# Patient Record
Sex: FEMALE | Race: WHITE | Employment: UNEMPLOYED | URBAN - METROPOLITAN AREA
[De-identification: names, ages, dates, MRNs, and addresses within clinical notes are randomized per-mention and may not be internally consistent; named-entity substitution may affect disease eponyms.]

---

## 2017-03-13 ENCOUNTER — OFFICE VISIT (OUTPATIENT)
Dept: INTERNAL MEDICINE CLINIC | Facility: CLINIC | Age: 57
End: 2017-03-13

## 2017-03-13 DIAGNOSIS — Z12.31 VISIT FOR SCREENING MAMMOGRAM: Primary | ICD-10-CM

## 2017-03-13 PROBLEM — M19.012 ARTHRITIS OF LEFT SHOULDER REGION: Status: ACTIVE | Noted: 2017-03-13

## 2017-03-13 PROBLEM — M75.40 IMPINGEMENT SYNDROME, SHOULDER: Status: ACTIVE | Noted: 2017-03-13

## 2017-03-13 PROBLEM — M25.512 ACUTE PAIN OF LEFT SHOULDER: Status: ACTIVE | Noted: 2017-03-13

## 2017-03-13 NOTE — PROGRESS NOTES
Patient here for annual appointment but 3 months early. Will order mamm and have her return in June due to insurance issues, no acute issues.

## 2017-03-18 ENCOUNTER — HOSPITAL ENCOUNTER (OUTPATIENT)
Dept: MAMMOGRAPHY | Age: 57
Discharge: HOME | End: 2017-03-18
Attending: INTERNAL MEDICINE
Payer: COMMERCIAL

## 2017-03-18 DIAGNOSIS — Z12.31 VISIT FOR SCREENING MAMMOGRAM: ICD-10-CM

## 2017-03-18 PROCEDURE — 77067 SCR MAMMO BI INCL CAD: CPT

## 2017-04-07 ENCOUNTER — TELEPHONE (OUTPATIENT)
Dept: INTERNAL MEDICINE CLINIC | Facility: CLINIC | Age: 57
End: 2017-04-07

## 2017-04-07 ENCOUNTER — APPOINTMENT (OUTPATIENT)
Dept: LAB | Facility: HOSPITAL | Age: 57
End: 2017-04-07
Attending: INTERNAL MEDICINE
Payer: COMMERCIAL

## 2017-04-07 DIAGNOSIS — R30.0 DYSURIA: Primary | ICD-10-CM

## 2017-04-07 PROCEDURE — 81003 URINALYSIS AUTO W/O SCOPE: CPT | Performed by: INTERNAL MEDICINE

## 2017-04-07 RX ORDER — SULFAMETHOXAZOLE AND TRIMETHOPRIM 800; 160 MG/1; MG/1
1 TABLET ORAL 2 TIMES DAILY
Qty: 14 TABLET | Refills: 0 | Status: SHIPPED | OUTPATIENT
Start: 2017-04-07 | End: 2017-04-14

## 2017-04-07 NOTE — TELEPHONE ENCOUNTER
Left message on machine new Rx sent to [address] [place] [address] If needs to be sent to another pharmacy please call office.

## 2017-04-07 NOTE — TELEPHONE ENCOUNTER
Called and spoke to patient in regards to her symptoms. Per patient she has burning sensation when she urinates, only at the end of her micturition.   Per patient, she has no reports of more urgency, frequency, patient does feel like she can completely emp

## 2017-04-17 PROBLEM — M75.42 IMPINGEMENT SYNDROME OF LEFT SHOULDER: Status: ACTIVE | Noted: 2017-04-17

## 2017-04-17 PROBLEM — M75.80 ROTATOR CUFF TENDONITIS: Status: ACTIVE | Noted: 2017-04-17

## 2017-04-17 PROBLEM — M75.20 BICEPS TENDONITIS: Status: ACTIVE | Noted: 2017-04-17

## 2017-04-17 PROBLEM — M19.012 ARTHRITIS OF SHOULDER REGION, LEFT: Status: ACTIVE | Noted: 2017-04-17

## 2017-07-15 ENCOUNTER — HOSPITAL ENCOUNTER (OUTPATIENT)
Age: 57
Discharge: HOME | End: 2017-07-15
Attending: FAMILY MEDICINE
Payer: COMMERCIAL

## 2017-07-15 VITALS
RESPIRATION RATE: 16 BRPM | HEART RATE: 70 BPM | OXYGEN SATURATION: 97 % | TEMPERATURE: 99 F | HEIGHT: 69 IN | SYSTOLIC BLOOD PRESSURE: 123 MMHG | WEIGHT: 200 LBS | BODY MASS INDEX: 29.62 KG/M2 | DIASTOLIC BLOOD PRESSURE: 60 MMHG

## 2017-07-15 DIAGNOSIS — H66.92 LEFT OTITIS MEDIA, UNSPECIFIED CHRONICITY, UNSPECIFIED OTITIS MEDIA TYPE: Primary | ICD-10-CM

## 2017-07-15 DIAGNOSIS — J30.9 ACUTE ALLERGIC RHINITIS, UNSPECIFIED SEASONALITY, UNSPECIFIED TRIGGER: ICD-10-CM

## 2017-07-15 PROCEDURE — 99214 OFFICE O/P EST MOD 30 MIN: CPT

## 2017-07-15 PROCEDURE — 99213 OFFICE O/P EST LOW 20 MIN: CPT

## 2017-07-15 RX ORDER — AZITHROMYCIN 250 MG/1
TABLET, FILM COATED ORAL
Qty: 1 PACKAGE | Refills: 0 | Status: SHIPPED | OUTPATIENT
Start: 2017-07-15 | End: 2017-07-20

## 2017-07-15 NOTE — ED PROVIDER NOTES
Patient Seen in: [address]    History   Patient presents with:  Ear Problem Pain (neurosensory)    Stated Complaint: clogged left ear    HPI    Patient here today with  Family with c/o URI symptoms for a couple of days, 90.7 kg   SpO2 97%   BMI 29.53 kg/m²       GENERAL: NAD  EARS: left tm injected, dull, no perforation,  NOSE: nasal turbinates boggy  THROAT:nl   LUNGS:clear no resp distress, increased upper airway sounds, clear at the bases  SKIN: good skin turgor, no ob

## 2017-07-15 NOTE — ED INITIAL ASSESSMENT (HPI)
Clogged left ear for 2 weeks; had URI about 2 weeks ago that resolved but congestion has remained in left ear; has tried nasal spray and sudafed

## 2017-08-26 ENCOUNTER — HOSPITAL ENCOUNTER (OUTPATIENT)
Age: 57
Discharge: HOME | End: 2017-08-26
Attending: FAMILY MEDICINE
Payer: COMMERCIAL

## 2017-08-26 VITALS
DIASTOLIC BLOOD PRESSURE: 63 MMHG | TEMPERATURE: 98 F | WEIGHT: 210 LBS | OXYGEN SATURATION: 99 % | SYSTOLIC BLOOD PRESSURE: 121 MMHG | HEART RATE: 76 BPM | HEIGHT: 69 IN | RESPIRATION RATE: 18 BRPM | BODY MASS INDEX: 31.1 KG/M2

## 2017-08-26 DIAGNOSIS — H65.05 RECURRENT ACUTE SEROUS OTITIS MEDIA OF LEFT EAR: Primary | ICD-10-CM

## 2017-08-26 PROCEDURE — 99214 OFFICE O/P EST MOD 30 MIN: CPT

## 2017-08-26 PROCEDURE — 99213 OFFICE O/P EST LOW 20 MIN: CPT

## 2017-08-26 RX ORDER — CEFDINIR 300 MG/1
300 CAPSULE ORAL 2 TIMES DAILY
Qty: 20 CAPSULE | Refills: 0 | Status: SHIPPED | OUTPATIENT
Start: 2017-08-26 | End: 2017-09-05

## 2017-08-26 RX ORDER — METHYLPREDNISOLONE 4 MG/1
TABLET ORAL
Qty: 21 TABLET | Refills: 0 | Status: SHIPPED | OUTPATIENT
Start: 2017-08-26 | End: 2017-09-01 | Stop reason: ALTCHOICE

## 2017-08-26 NOTE — ED INITIAL ASSESSMENT (HPI)
Left ear pain     Seen 1 month ago for the same complaint. .Doctor dx her with seasonal allergies and gave her Claritin and Flonase. Pt. Has been off of it for 3 days and her ear pain has come back.

## 2017-08-26 NOTE — ED PROVIDER NOTES
Patient presents with:  Ear Problem Pain (neurosensory)    HPI:     Stephania Jane is a 56year old female who presents with chief complaint of left ear pain. Onset of symptoms was abrupt starting 1 month ago.     Recent URI: no   Fever: no   Sore throat canal is within normal limits. No debris, inflammation noted. No tragal or pinna tenderness noted.   Nose: mild congestion  Throat: lips, mucosa, and tongue normal; teeth and gums normal  Neck: no adenopathy and supple, symmetrical, trachea midline  Lungs

## 2017-08-29 PROBLEM — M75.82 ROTATOR CUFF TENDONITIS, LEFT: Status: ACTIVE | Noted: 2017-04-17

## 2017-08-29 PROBLEM — M75.22 BICEPS TENDONITIS, LEFT: Status: ACTIVE | Noted: 2017-04-17

## 2017-09-01 ENCOUNTER — OFFICE VISIT (OUTPATIENT)
Dept: OTOLARYNGOLOGY | Facility: CLINIC | Age: 57
End: 2017-09-01

## 2017-09-01 VITALS
WEIGHT: 210 LBS | RESPIRATION RATE: 16 BRPM | BODY MASS INDEX: 31.83 KG/M2 | SYSTOLIC BLOOD PRESSURE: 112 MMHG | HEIGHT: 68 IN | DIASTOLIC BLOOD PRESSURE: 64 MMHG | TEMPERATURE: 99 F

## 2017-09-01 DIAGNOSIS — H65.32 CHRONIC MUCOID OTITIS MEDIA OF LEFT EAR: Primary | ICD-10-CM

## 2017-09-01 PROCEDURE — 99212 OFFICE O/P EST SF 10 MIN: CPT | Performed by: OTOLARYNGOLOGY

## 2017-09-01 PROCEDURE — 69420 INCISION OF EARDRUM: CPT | Performed by: OTOLARYNGOLOGY

## 2017-09-01 PROCEDURE — 99243 OFF/OP CNSLTJ NEW/EST LOW 30: CPT | Performed by: OTOLARYNGOLOGY

## 2017-09-01 NOTE — PROGRESS NOTES
Nir Salgado is a 56year old female. Patient presents with:  Ear Problem: Patient present for blocked left ear.  Patient c/o left ear pain 2/10 that started this morning         HISTORY OF PRESENT ILLNESS  9/1/2017   Here for evaluation of persistent plu intolerance. Neuro Negative Tremors. Psych Negative Behavioral issues   Integumentary Negative Frequent skin infections, pigment change and rash. Hema/Lymph Negative Easy bleeding and easy bruising.      PHYSICAL EXAM    /64 (BP Location: Left a 1 tablet (0.25 mg total) by mouth 2 (two) times daily as needed. , Disp: 30 tablet, Rfl: 1    ASSESSMENT AND PLAN  1.  Chronic mucoid otitis media of left ear  Myringotomy was done today and I recommend she keep water out of the ear follow-up if she still ha

## 2017-09-08 ENCOUNTER — TELEPHONE (OUTPATIENT)
Dept: OTOLARYNGOLOGY | Facility: CLINIC | Age: 57
End: 2017-09-08

## 2017-09-08 NOTE — TELEPHONE ENCOUNTER
, pt of  seen on 09/01 chronic mucoid otitis media of left ear and myringotomy performed.  Per pt Dr. Fer Buenrostro informed for the first 3 days she should try popping ear by holding breath, pt states she did that for three days as instructed an

## 2017-09-11 ENCOUNTER — OFFICE VISIT (OUTPATIENT)
Dept: OTOLARYNGOLOGY | Facility: CLINIC | Age: 57
End: 2017-09-11

## 2017-09-11 VITALS
SYSTOLIC BLOOD PRESSURE: 111 MMHG | BODY MASS INDEX: 30.06 KG/M2 | WEIGHT: 210 LBS | HEIGHT: 70 IN | DIASTOLIC BLOOD PRESSURE: 71 MMHG | HEART RATE: 74 BPM

## 2017-09-11 DIAGNOSIS — H65.32 CHRONIC MUCOID OTITIS MEDIA OF LEFT EAR: Primary | ICD-10-CM

## 2017-09-11 PROCEDURE — 99212 OFFICE O/P EST SF 10 MIN: CPT | Performed by: OTOLARYNGOLOGY

## 2017-09-11 PROCEDURE — 99024 POSTOP FOLLOW-UP VISIT: CPT | Performed by: OTOLARYNGOLOGY

## 2017-09-11 RX ORDER — OFLOXACIN 3 MG/ML
3 SOLUTION AURICULAR (OTIC) 3 TIMES DAILY
Qty: 1 BOTTLE | Refills: 0 | Status: SHIPPED | OUTPATIENT
Start: 2017-09-11 | End: 2017-09-16

## 2017-09-11 NOTE — PROGRESS NOTES
Alfredo Pisano is a 56year old female. Patient presents with: Follow - Up: Pt feels her left ear is still filled with fluid. Took Sudafed ad Afrin Nasal Spray which helped some but she can still feel \"sloshing\" around.         HISTORY OF PRESENT ILLNESS Negative Abdominal pain and diarrhea. Endocrine Negative Cold intolerance and heat intolerance. Neuro Negative Tremors. Psych Negative Behavioral issues   Integumentary Negative Frequent skin infections, pigment change and rash.    Hema/Lymph Negative

## 2017-12-08 ENCOUNTER — OFFICE VISIT (OUTPATIENT)
Dept: INTERNAL MEDICINE CLINIC | Facility: CLINIC | Age: 57
End: 2017-12-08

## 2017-12-08 VITALS
SYSTOLIC BLOOD PRESSURE: 100 MMHG | TEMPERATURE: 98 F | BODY MASS INDEX: 30.64 KG/M2 | DIASTOLIC BLOOD PRESSURE: 65 MMHG | WEIGHT: 214 LBS | HEIGHT: 70 IN | HEART RATE: 65 BPM

## 2017-12-08 DIAGNOSIS — Z12.31 SCREENING MAMMOGRAM, ENCOUNTER FOR: ICD-10-CM

## 2017-12-08 DIAGNOSIS — Z00.00 ROUTINE GENERAL MEDICAL EXAMINATION AT A HEALTH CARE FACILITY: Primary | ICD-10-CM

## 2017-12-08 PROBLEM — M25.512 ACUTE PAIN OF LEFT SHOULDER: Status: RESOLVED | Noted: 2017-03-13 | Resolved: 2017-12-08

## 2017-12-08 PROCEDURE — 36415 COLL VENOUS BLD VENIPUNCTURE: CPT | Performed by: INTERNAL MEDICINE

## 2017-12-08 PROCEDURE — 99396 PREV VISIT EST AGE 40-64: CPT | Performed by: INTERNAL MEDICINE

## 2017-12-08 RX ORDER — ALPRAZOLAM 0.25 MG/1
0.25 TABLET ORAL 2 TIMES DAILY PRN
Qty: 30 TABLET | Refills: 1 | Status: SHIPPED | OUTPATIENT
Start: 2017-12-08 | End: 2020-07-13

## 2017-12-08 NOTE — PROGRESS NOTES
Riki Chanel is a 56year old female who presents for a complete physical exam.   HPI:   Pt complains of needs PE    Had 'frozen shoulder' saw Dr Gallardo He, required PT which seemed to have helped     Wt Readings from Last 3 Encounters:  12/08/17 : 214 lb Negative Anxiety, depression and insomnia but mild anxiety with flying, uses xanax prn   Integumentary Negative Rash, changing skin lesions   MS Negative Back pain and shoulder pain - post PT, plantar fasciitis and achilles tendon   Hema/Lymph Negative Eas agrees to the plan. The patient is asked to return for annual physical in 1 year.

## 2017-12-08 NOTE — PATIENT INSTRUCTIONS
ASSESSMENT AND PLAN:   Tiago Zavala is a 56year old female who presents for a complete physical exam.   Pt's weight is Body mass index is 30.71 kg/m². , recommended low fat diet and aerobic exercise 30 minutes three times weekly.    Health maintenance: pa

## 2018-03-21 ENCOUNTER — HOSPITAL ENCOUNTER (OUTPATIENT)
Age: 58
Discharge: HOME | End: 2018-03-21
Attending: FAMILY MEDICINE
Payer: COMMERCIAL

## 2018-03-21 ENCOUNTER — APPOINTMENT (OUTPATIENT)
Dept: GENERAL RADIOLOGY | Age: 58
End: 2018-03-21
Attending: FAMILY MEDICINE
Payer: COMMERCIAL

## 2018-03-21 VITALS
RESPIRATION RATE: 17 BRPM | DIASTOLIC BLOOD PRESSURE: 53 MMHG | HEIGHT: 69 IN | SYSTOLIC BLOOD PRESSURE: 96 MMHG | OXYGEN SATURATION: 100 % | BODY MASS INDEX: 30.36 KG/M2 | TEMPERATURE: 98 F | HEART RATE: 70 BPM | WEIGHT: 205 LBS

## 2018-03-21 DIAGNOSIS — M17.10 ARTHRITIS OF KNEE: Primary | ICD-10-CM

## 2018-03-21 PROCEDURE — 99214 OFFICE O/P EST MOD 30 MIN: CPT

## 2018-03-21 PROCEDURE — 99213 OFFICE O/P EST LOW 20 MIN: CPT

## 2018-03-21 RX ORDER — METHYLPREDNISOLONE 4 MG/1
TABLET ORAL
Qty: 1 PACKAGE | Refills: 0 | Status: SHIPPED | OUTPATIENT
Start: 2018-03-21 | End: 2018-03-26

## 2018-03-21 RX ORDER — HYDROCODONE BITARTRATE AND ACETAMINOPHEN 5; 325 MG/1; MG/1
1-2 TABLET ORAL EVERY 4 HOURS PRN
Qty: 20 TABLET | Refills: 0 | Status: SHIPPED | OUTPATIENT
Start: 2018-03-21 | End: 2018-03-26

## 2018-03-21 RX ORDER — MELOXICAM 7.5 MG/1
7.5 TABLET ORAL DAILY
COMMUNITY
End: 2019-04-17 | Stop reason: ALTCHOICE

## 2018-03-21 NOTE — ED PROVIDER NOTES
Patient Seen in: [address]    History   CC:  Patient presents with:  Lower Extremity Injury (musculoskeletal)    Stated Complaint: lt knee pain    ------------------------------  Per Rn: (paraphrase)     pt w/ left knee EOM's intact   Ears:    Normal TM's and external ear canals, both ears   Nose:   Nares normal, septum midline, mucosa normal, min drainage, clear, no sinus tenderness   Throat:   mild hyperemia, no exudate;    Neck:   Supple, symmetrical, trachea midline,

## 2018-03-21 NOTE — ED INITIAL ASSESSMENT (HPI)
Verified pt's name and birthday at start of triage with pt and ID band applied. Pt presents to the IC with c/o left knee pain s/p kneeling on it while working out on Sunday. Pt notes swelling since yesterday. Denies numbness/tingling. Arrives with bandage.

## 2018-08-10 ENCOUNTER — HOSPITAL ENCOUNTER (OUTPATIENT)
Dept: MAMMOGRAPHY | Age: 58
Discharge: HOME | End: 2018-08-10
Attending: INTERNAL MEDICINE
Payer: COMMERCIAL

## 2018-08-10 DIAGNOSIS — Z12.31 SCREENING MAMMOGRAM, ENCOUNTER FOR: ICD-10-CM

## 2018-08-10 PROCEDURE — 77067 SCR MAMMO BI INCL CAD: CPT | Performed by: INTERNAL MEDICINE

## 2018-08-10 PROCEDURE — 77063 BREAST TOMOSYNTHESIS BI: CPT | Performed by: INTERNAL MEDICINE

## 2018-08-21 ENCOUNTER — HOSPITAL ENCOUNTER (OUTPATIENT)
Dept: MAMMOGRAPHY | Facility: HOSPITAL | Age: 58
Discharge: HOME | End: 2018-08-21
Attending: INTERNAL MEDICINE
Payer: COMMERCIAL

## 2018-08-21 ENCOUNTER — HOSPITAL ENCOUNTER (OUTPATIENT)
Dept: ULTRASOUND IMAGING | Facility: HOSPITAL | Age: 58
Discharge: HOME | End: 2018-08-21
Attending: INTERNAL MEDICINE
Payer: COMMERCIAL

## 2018-08-21 DIAGNOSIS — R92.8 ABNORMAL MAMMOGRAM: ICD-10-CM

## 2018-08-21 PROCEDURE — 77065 DX MAMMO INCL CAD UNI: CPT | Performed by: INTERNAL MEDICINE

## 2018-08-21 PROCEDURE — 76642 ULTRASOUND BREAST LIMITED: CPT | Performed by: INTERNAL MEDICINE

## 2018-08-21 PROCEDURE — 77061 BREAST TOMOSYNTHESIS UNI: CPT | Performed by: INTERNAL MEDICINE

## 2019-04-17 ENCOUNTER — OFFICE VISIT (OUTPATIENT)
Dept: INTERNAL MEDICINE CLINIC | Facility: CLINIC | Age: 59
End: 2019-04-17
Payer: COMMERCIAL

## 2019-04-17 VITALS
HEIGHT: 70 IN | DIASTOLIC BLOOD PRESSURE: 70 MMHG | HEART RATE: 74 BPM | OXYGEN SATURATION: 99 % | SYSTOLIC BLOOD PRESSURE: 118 MMHG | WEIGHT: 221.19 LBS | BODY MASS INDEX: 31.67 KG/M2

## 2019-04-17 DIAGNOSIS — Z00.00 PHYSICAL EXAM: Primary | ICD-10-CM

## 2019-04-17 PROCEDURE — 99386 PREV VISIT NEW AGE 40-64: CPT | Performed by: FAMILY MEDICINE

## 2019-04-17 NOTE — PROGRESS NOTES
HPI:   Lata Palumbo is a 66year old female who presents for a complete physical exam.  New pt  Past pcp Dr Daisha Wiley     Last mammo:  needs  Last pap:  UTD  Menses:  no  Previous colonoscopy:  UTD  Exercise:  Stopped   Tries to eat clean- no dairy/ grain Alcohol use:  Yes      Alcohol/week: 0.0 oz      Comment: Socially     Drug use: No         REVIEW OF SYSTEMS:   GENERAL: feels well otherwise  SKIN: denies any unusual skin lesions  EYES:no vision problems  LUNGS: denies shortness of breath  CARDIOVASCULAR progesterone, however, if has any more vaginal bleeding, pt to let me know and also to see gynecology. Also need her recent pap records  -work on increase exercise and healthy diet  - [facility] DIAGNOSTIC RIGHT (CPT=77065);  Future  - COMP METABOLIC PANEL (14);

## 2019-05-02 ENCOUNTER — HOSPITAL ENCOUNTER (OUTPATIENT)
Dept: MAMMOGRAPHY | Facility: HOSPITAL | Age: 59
Discharge: HOME | End: 2019-05-02
Attending: FAMILY MEDICINE
Payer: COMMERCIAL

## 2019-05-02 ENCOUNTER — LAB ENCOUNTER (OUTPATIENT)
Dept: LAB | Facility: HOSPITAL | Age: 59
End: 2019-05-02
Attending: FAMILY MEDICINE
Payer: COMMERCIAL

## 2019-05-02 DIAGNOSIS — Z00.00 PHYSICAL EXAM: ICD-10-CM

## 2019-05-02 DIAGNOSIS — R92.8 ABNORMAL MAMMOGRAM: Primary | ICD-10-CM

## 2019-05-02 PROCEDURE — 85025 COMPLETE CBC W/AUTO DIFF WBC: CPT

## 2019-05-02 PROCEDURE — 80061 LIPID PANEL: CPT

## 2019-05-02 PROCEDURE — 77065 DX MAMMO INCL CAD UNI: CPT | Performed by: FAMILY MEDICINE

## 2019-05-02 PROCEDURE — 84443 ASSAY THYROID STIM HORMONE: CPT

## 2019-05-02 PROCEDURE — 77061 BREAST TOMOSYNTHESIS UNI: CPT | Performed by: FAMILY MEDICINE

## 2019-05-02 PROCEDURE — 82306 VITAMIN D 25 HYDROXY: CPT

## 2019-05-02 PROCEDURE — 36415 COLL VENOUS BLD VENIPUNCTURE: CPT

## 2019-05-02 PROCEDURE — 80053 COMPREHEN METABOLIC PANEL: CPT

## 2019-05-06 ENCOUNTER — TELEPHONE (OUTPATIENT)
Dept: FAMILY MEDICINE CLINIC | Facility: CLINIC | Age: 59
End: 2019-05-06

## 2019-10-22 ENCOUNTER — TELEPHONE (OUTPATIENT)
Dept: INTERNAL MEDICINE CLINIC | Facility: CLINIC | Age: 59
End: 2019-10-22

## 2019-10-22 DIAGNOSIS — R92.8 ABNORMAL MAMMOGRAM: Primary | ICD-10-CM

## 2019-10-22 NOTE — TELEPHONE ENCOUNTER
LVM for pt that I will forward msg to Dr. Elisabeth Barrow to add new orders & c/b when placed.     CV

## 2019-10-29 ENCOUNTER — TELEPHONE (OUTPATIENT)
Dept: INTERNAL MEDICINE CLINIC | Facility: CLINIC | Age: 59
End: 2019-10-29

## 2019-11-01 ENCOUNTER — TELEPHONE (OUTPATIENT)
Dept: INTERNAL MEDICINE CLINIC | Facility: CLINIC | Age: 59
End: 2019-11-01

## 2019-11-01 NOTE — TELEPHONE ENCOUNTER
Patient called and said she still doesn't see the mammogram referral in her Mychart to call to get her mammogram scheduled. Asked if Suad White the nurse could please help in getting the referral she needs. Order was placed by Dr. Marie Zeng on 10/22.

## 2019-11-08 ENCOUNTER — HOSPITAL ENCOUNTER (OUTPATIENT)
Dept: MAMMOGRAPHY | Facility: HOSPITAL | Age: 59
Discharge: HOME | End: 2019-11-08
Attending: FAMILY MEDICINE
Payer: COMMERCIAL

## 2019-11-08 DIAGNOSIS — R92.8 ABNORMAL MAMMOGRAM: ICD-10-CM

## 2019-11-08 PROCEDURE — 77062 BREAST TOMOSYNTHESIS BI: CPT | Performed by: FAMILY MEDICINE

## 2019-11-08 PROCEDURE — 77066 DX MAMMO INCL CAD BI: CPT | Performed by: FAMILY MEDICINE

## 2020-01-12 ENCOUNTER — APPOINTMENT (OUTPATIENT)
Dept: GENERAL RADIOLOGY | Age: 60
End: 2020-01-12
Attending: EMERGENCY MEDICINE
Payer: COMMERCIAL

## 2020-01-12 ENCOUNTER — HOSPITAL ENCOUNTER (OUTPATIENT)
Age: 60
Discharge: HOME | End: 2020-01-12
Attending: EMERGENCY MEDICINE
Payer: COMMERCIAL

## 2020-01-12 VITALS
HEART RATE: 58 BPM | DIASTOLIC BLOOD PRESSURE: 69 MMHG | WEIGHT: 230 LBS | SYSTOLIC BLOOD PRESSURE: 127 MMHG | OXYGEN SATURATION: 100 % | RESPIRATION RATE: 20 BRPM | BODY MASS INDEX: 34.07 KG/M2 | HEIGHT: 69 IN | TEMPERATURE: 98 F

## 2020-01-12 DIAGNOSIS — M62.830 SPASM OF THORACIC BACK MUSCLE: Primary | ICD-10-CM

## 2020-01-12 PROCEDURE — 99214 OFFICE O/P EST MOD 30 MIN: CPT

## 2020-01-12 PROCEDURE — 72072 X-RAY EXAM THORAC SPINE 3VWS: CPT | Performed by: EMERGENCY MEDICINE

## 2020-01-12 PROCEDURE — 99213 OFFICE O/P EST LOW 20 MIN: CPT

## 2020-01-12 RX ORDER — IBUPROFEN 800 MG/1
800 TABLET ORAL EVERY 6 HOURS PRN
COMMUNITY
End: 2021-10-25 | Stop reason: ALTCHOICE

## 2020-01-12 RX ORDER — DIAZEPAM 5 MG/1
5 TABLET ORAL NIGHTLY PRN
Qty: 7 TABLET | Refills: 0 | Status: SHIPPED | OUTPATIENT
Start: 2020-01-12 | End: 2020-01-19

## 2020-01-12 NOTE — ED PROVIDER NOTES
Patient Seen in: [address]    History   Patient presents with:  Back Pain    Stated Complaint: back pain    HPI    Chief complaint: Back pain    History of present illness:   The patient complains of back pain, that began Pulse 58   Resp 20   Temp 97.5 °F (36.4 °C)   Temp src Oral   SpO2 100 %   O2 Device None (Room air)       Current:/69   Pulse 58   Temp 97.5 °F (36.4 °C) (Oral)   Resp 20   Ht 175.3 cm (5' 9\")   Wt 104.3 kg   SpO2 100%   BMI 33.97 kg/m²     PULSE

## 2020-01-12 NOTE — ED INITIAL ASSESSMENT (HPI)
Pt to IC with thoracic back pain starting yesterday upon getting out of bed. States getting progressively worse. Took Ibuprofen 800mg at 0600 with moderate relief reported. Denies previous injury. Denies incident of injury.  Denies changes in bowel/bladder

## 2020-07-13 ENCOUNTER — OFFICE VISIT (OUTPATIENT)
Dept: INTERNAL MEDICINE CLINIC | Facility: CLINIC | Age: 60
End: 2020-07-13
Payer: COMMERCIAL

## 2020-07-13 VITALS
DIASTOLIC BLOOD PRESSURE: 74 MMHG | HEART RATE: 70 BPM | TEMPERATURE: 98 F | HEIGHT: 69 IN | SYSTOLIC BLOOD PRESSURE: 102 MMHG | OXYGEN SATURATION: 98 % | WEIGHT: 231.63 LBS | BODY MASS INDEX: 34.31 KG/M2

## 2020-07-13 DIAGNOSIS — Z00.00 PHYSICAL EXAM: Primary | ICD-10-CM

## 2020-07-13 PROCEDURE — 87624 HPV HI-RISK TYP POOLED RSLT: CPT | Performed by: FAMILY MEDICINE

## 2020-07-13 PROCEDURE — 99396 PREV VISIT EST AGE 40-64: CPT | Performed by: FAMILY MEDICINE

## 2020-07-13 RX ORDER — MELOXICAM 7.5 MG/1
TABLET ORAL
COMMUNITY
Start: 2020-06-27 | End: 2021-12-01

## 2020-07-13 NOTE — PROGRESS NOTES
HPI:   Alice Watts is a 64year old female who presents for a complete physical exam.  New pt      Last mammo:  UTD  Last pap:  needs  Menses:  no  Previous colonoscopy:  UTD  Exercise:  3 x a week      Has gained a lot of weight ( knows how)  Trying problems  LUNGS: denies shortness of breath  CARDIOVASCULAR: denies chest pain  GI: denies abdominal pain,  No constipation or diarrhea  : denies dysuria, vaginal discharge or itching  NEURO: denies headaches  PSYCHE: denies depression or anxiety    EXAM liquid calories. Pt given printed info on weight loss recommendations. - CBC WITH DIFFERENTIAL WITH PLATELET; Future  - COMP METABOLIC PANEL (14); Future  - TSH W REFLEX TO FREE T4; Future  - VITAMIN D, 25-HYDROXY; Future  - LIPID PANEL;  Future  - THIN

## 2020-07-15 LAB — HPV I/H RISK 1 DNA SPEC QL NAA+PROBE: NEGATIVE

## 2020-07-21 ENCOUNTER — NURSE ONLY (OUTPATIENT)
Dept: INTERNAL MEDICINE CLINIC | Facility: CLINIC | Age: 60
End: 2020-07-21
Payer: COMMERCIAL

## 2020-07-21 DIAGNOSIS — Z00.00 PHYSICAL EXAM: ICD-10-CM

## 2020-07-21 LAB
ALBUMIN SERPL-MCNC: 3.5 G/DL (ref 3.4–5)
ALBUMIN/GLOB SERPL: 0.9 {RATIO} (ref 1–2)
ALP LIVER SERPL-CCNC: 82 U/L (ref 46–118)
ALT SERPL-CCNC: 31 U/L (ref 13–56)
ANION GAP SERPL CALC-SCNC: 6 MMOL/L (ref 0–18)
AST SERPL-CCNC: 28 U/L (ref 15–37)
BASOPHILS # BLD AUTO: 0.05 X10(3) UL (ref 0–0.2)
BASOPHILS NFR BLD AUTO: 0.7 %
BILIRUB SERPL-MCNC: 0.6 MG/DL (ref 0.1–2)
BUN BLD-MCNC: 14 MG/DL (ref 7–18)
BUN/CREAT SERPL: 15.4 (ref 10–20)
CALCIUM BLD-MCNC: 9 MG/DL (ref 8.5–10.1)
CHLORIDE SERPL-SCNC: 109 MMOL/L (ref 98–112)
CHOLEST SMN-MCNC: 212 MG/DL (ref ?–200)
CO2 SERPL-SCNC: 26 MMOL/L (ref 21–32)
CREAT BLD-MCNC: 0.91 MG/DL (ref 0.55–1.02)
DEPRECATED RDW RBC AUTO: 48.1 FL (ref 35.1–46.3)
EOSINOPHIL # BLD AUTO: 0.12 X10(3) UL (ref 0–0.7)
EOSINOPHIL NFR BLD AUTO: 1.7 %
ERYTHROCYTE [DISTWIDTH] IN BLOOD BY AUTOMATED COUNT: 13.7 % (ref 11–15)
GLOBULIN PLAS-MCNC: 4 G/DL (ref 2.8–4.4)
GLUCOSE BLD-MCNC: 87 MG/DL (ref 70–99)
HCT VFR BLD AUTO: 43.4 % (ref 35–48)
HDLC SERPL-MCNC: 78 MG/DL (ref 40–59)
HGB BLD-MCNC: 14.1 G/DL (ref 12–16)
IMM GRANULOCYTES # BLD AUTO: 0.03 X10(3) UL (ref 0–1)
IMM GRANULOCYTES NFR BLD: 0.4 %
LDLC SERPL CALC-MCNC: 120 MG/DL (ref ?–100)
LYMPHOCYTES # BLD AUTO: 2.3 X10(3) UL (ref 1–4)
LYMPHOCYTES NFR BLD AUTO: 31.8 %
M PROTEIN MFR SERPL ELPH: 7.5 G/DL (ref 6.4–8.2)
MCH RBC QN AUTO: 31.1 PG (ref 26–34)
MCHC RBC AUTO-ENTMCNC: 32.5 G/DL (ref 31–37)
MCV RBC AUTO: 95.6 FL (ref 80–100)
MONOCYTES # BLD AUTO: 0.58 X10(3) UL (ref 0.1–1)
MONOCYTES NFR BLD AUTO: 8 %
NEUTROPHILS # BLD AUTO: 4.16 X10 (3) UL (ref 1.5–7.7)
NEUTROPHILS # BLD AUTO: 4.16 X10(3) UL (ref 1.5–7.7)
NEUTROPHILS NFR BLD AUTO: 57.4 %
NONHDLC SERPL-MCNC: 134 MG/DL (ref ?–130)
OSMOLALITY SERPL CALC.SUM OF ELEC: 292 MOSM/KG (ref 275–295)
PATIENT FASTING Y/N/NP: YES
PATIENT FASTING Y/N/NP: YES
PLATELET # BLD AUTO: 261 10(3)UL (ref 150–450)
POTASSIUM SERPL-SCNC: 4.3 MMOL/L (ref 3.5–5.1)
RBC # BLD AUTO: 4.54 X10(6)UL (ref 3.8–5.3)
SODIUM SERPL-SCNC: 141 MMOL/L (ref 136–145)
T4 FREE SERPL-MCNC: 0.9 NG/DL (ref 0.8–1.7)
TRIGL SERPL-MCNC: 68 MG/DL (ref 30–149)
TSI SER-ACNC: 4.88 MIU/ML (ref 0.36–3.74)
VLDLC SERPL CALC-MCNC: 14 MG/DL (ref 0–30)
WBC # BLD AUTO: 7.2 X10(3) UL (ref 4–11)

## 2020-07-21 PROCEDURE — 80050 GENERAL HEALTH PANEL: CPT | Performed by: FAMILY MEDICINE

## 2020-07-21 PROCEDURE — 82306 VITAMIN D 25 HYDROXY: CPT | Performed by: FAMILY MEDICINE

## 2020-07-21 PROCEDURE — 84439 ASSAY OF FREE THYROXINE: CPT | Performed by: FAMILY MEDICINE

## 2020-07-21 PROCEDURE — 36415 COLL VENOUS BLD VENIPUNCTURE: CPT | Performed by: FAMILY MEDICINE

## 2020-07-21 PROCEDURE — 80061 LIPID PANEL: CPT | Performed by: FAMILY MEDICINE

## 2020-07-21 NOTE — PROGRESS NOTES
Pt presented to clinic today for blood draw. Per physician able to draw orders. Orders  documented within chart. Pt tolerated lab draw well.  verified.   Orders drawn include: cbc, cmp, tsh, vit d, lipid  Site of draw: rt rita Polanco, [place]

## 2020-07-22 LAB — 25(OH)D3 SERPL-MCNC: 37.3 NG/ML (ref 30–100)

## 2020-10-13 ENCOUNTER — IMMUNIZATION (OUTPATIENT)
Dept: INTERNAL MEDICINE CLINIC | Facility: CLINIC | Age: 60
End: 2020-10-13
Payer: COMMERCIAL

## 2020-10-13 DIAGNOSIS — Z23 NEED FOR VACCINATION: ICD-10-CM

## 2020-10-26 PROCEDURE — 90686 IIV4 VACC NO PRSV 0.5 ML IM: CPT | Performed by: FAMILY MEDICINE

## 2020-10-26 PROCEDURE — 90471 IMMUNIZATION ADMIN: CPT | Performed by: FAMILY MEDICINE

## 2020-11-19 ENCOUNTER — HOSPITAL ENCOUNTER (OUTPATIENT)
Dept: MAMMOGRAPHY | Facility: HOSPITAL | Age: 60
Discharge: HOME | End: 2020-11-19
Attending: FAMILY MEDICINE
Payer: COMMERCIAL

## 2020-11-19 DIAGNOSIS — Z12.31 ENCOUNTER FOR SCREENING MAMMOGRAM FOR MALIGNANT NEOPLASM OF BREAST: ICD-10-CM

## 2020-11-19 PROCEDURE — 77063 BREAST TOMOSYNTHESIS BI: CPT | Performed by: FAMILY MEDICINE

## 2020-11-19 PROCEDURE — 77067 SCR MAMMO BI INCL CAD: CPT | Performed by: FAMILY MEDICINE

## 2021-10-25 ENCOUNTER — OFFICE VISIT (OUTPATIENT)
Dept: INTERNAL MEDICINE CLINIC | Facility: CLINIC | Age: 61
End: 2021-10-25
Payer: COMMERCIAL

## 2021-10-25 VITALS
RESPIRATION RATE: 16 BRPM | SYSTOLIC BLOOD PRESSURE: 110 MMHG | WEIGHT: 231 LBS | DIASTOLIC BLOOD PRESSURE: 70 MMHG | HEART RATE: 66 BPM | BODY MASS INDEX: 34.21 KG/M2 | HEIGHT: 69 IN | OXYGEN SATURATION: 98 %

## 2021-10-25 DIAGNOSIS — E66.9 OBESITY (BMI 30.0-34.9): ICD-10-CM

## 2021-10-25 DIAGNOSIS — Z12.31 ENCOUNTER FOR SCREENING MAMMOGRAM FOR MALIGNANT NEOPLASM OF BREAST: ICD-10-CM

## 2021-10-25 DIAGNOSIS — F41.9 ANXIETY: ICD-10-CM

## 2021-10-25 DIAGNOSIS — Z00.00 PHYSICAL EXAM: Primary | ICD-10-CM

## 2021-10-25 PROCEDURE — 3078F DIAST BP <80 MM HG: CPT | Performed by: FAMILY MEDICINE

## 2021-10-25 PROCEDURE — 90686 IIV4 VACC NO PRSV 0.5 ML IM: CPT | Performed by: FAMILY MEDICINE

## 2021-10-25 PROCEDURE — 99396 PREV VISIT EST AGE 40-64: CPT | Performed by: FAMILY MEDICINE

## 2021-10-25 PROCEDURE — 3074F SYST BP LT 130 MM HG: CPT | Performed by: FAMILY MEDICINE

## 2021-10-25 PROCEDURE — 3008F BODY MASS INDEX DOCD: CPT | Performed by: FAMILY MEDICINE

## 2021-10-25 PROCEDURE — 90471 IMMUNIZATION ADMIN: CPT | Performed by: FAMILY MEDICINE

## 2021-10-25 RX ORDER — ALPRAZOLAM 0.25 MG/1
0.25 TABLET ORAL 2 TIMES DAILY PRN
Qty: 20 TABLET | Refills: 0 | Status: SHIPPED | OUTPATIENT
Start: 2021-10-25

## 2021-10-25 NOTE — PROGRESS NOTES
HPI:   Neva Pyle is a 63year old female who presents for a complete physical exam.    Last mammo:  Due soon  Last pap:  UTD  Menses:  no  Previous colonoscopy:  Due next year   Exercise:  3 x a week    Just started weight watchers- has worked for h Used    Vaping Use      Vaping Use: Never used    Alcohol use:  Yes      Alcohol/week: 0.0 standard drinks      Comment: Socially     Drug use: No         REVIEW OF SYSTEMS:   GENERAL: feels well otherwise  SKIN: denies any unusual skin lesions  EYES:no vis DIFFERENTIAL WITH PLATELET; Future  - COMP METABOLIC PANEL (14); Future  - TSH W REFLEX TO FREE T4; Future  - LIPID PANEL; Future  - VITAMIN D; Future    2.  Encounter for screening mammogram for malignant neoplasm of breast    - [facility] XAVI 2D+3D SCREENING BI

## 2021-11-17 ENCOUNTER — LAB ENCOUNTER (OUTPATIENT)
Dept: LAB | Facility: HOSPITAL | Age: 61
End: 2021-11-17
Attending: FAMILY MEDICINE
Payer: COMMERCIAL

## 2021-11-17 DIAGNOSIS — Z00.00 PHYSICAL EXAM: ICD-10-CM

## 2021-11-17 PROCEDURE — 84443 ASSAY THYROID STIM HORMONE: CPT

## 2021-11-17 PROCEDURE — 85025 COMPLETE CBC W/AUTO DIFF WBC: CPT

## 2021-11-17 PROCEDURE — 80053 COMPREHEN METABOLIC PANEL: CPT

## 2021-11-17 PROCEDURE — 80061 LIPID PANEL: CPT

## 2021-11-17 PROCEDURE — 36415 COLL VENOUS BLD VENIPUNCTURE: CPT

## 2021-11-17 PROCEDURE — 84439 ASSAY OF FREE THYROXINE: CPT

## 2021-11-17 PROCEDURE — 82306 VITAMIN D 25 HYDROXY: CPT

## 2021-11-30 ENCOUNTER — HOSPITAL ENCOUNTER (OUTPATIENT)
Dept: MAMMOGRAPHY | Facility: HOSPITAL | Age: 61
Discharge: HOME | End: 2021-11-30
Attending: FAMILY MEDICINE
Payer: COMMERCIAL

## 2021-11-30 DIAGNOSIS — Z12.31 ENCOUNTER FOR SCREENING MAMMOGRAM FOR MALIGNANT NEOPLASM OF BREAST: ICD-10-CM

## 2021-11-30 PROCEDURE — 77067 SCR MAMMO BI INCL CAD: CPT | Performed by: FAMILY MEDICINE

## 2021-11-30 PROCEDURE — 77063 BREAST TOMOSYNTHESIS BI: CPT | Performed by: FAMILY MEDICINE

## 2021-12-01 ENCOUNTER — OFFICE VISIT (OUTPATIENT)
Dept: INTERNAL MEDICINE CLINIC | Facility: CLINIC | Age: 61
End: 2021-12-01
Payer: COMMERCIAL

## 2021-12-01 VITALS
OXYGEN SATURATION: 99 % | WEIGHT: 231 LBS | RESPIRATION RATE: 17 BRPM | SYSTOLIC BLOOD PRESSURE: 118 MMHG | DIASTOLIC BLOOD PRESSURE: 76 MMHG | HEART RATE: 69 BPM | HEIGHT: 69 IN | BODY MASS INDEX: 34.21 KG/M2

## 2021-12-01 DIAGNOSIS — F32.A DEPRESSION, UNSPECIFIED DEPRESSION TYPE: ICD-10-CM

## 2021-12-01 DIAGNOSIS — E03.8 SUBCLINICAL HYPOTHYROIDISM: Primary | ICD-10-CM

## 2021-12-01 PROCEDURE — 3008F BODY MASS INDEX DOCD: CPT | Performed by: FAMILY MEDICINE

## 2021-12-01 PROCEDURE — 3078F DIAST BP <80 MM HG: CPT | Performed by: FAMILY MEDICINE

## 2021-12-01 PROCEDURE — 99214 OFFICE O/P EST MOD 30 MIN: CPT | Performed by: FAMILY MEDICINE

## 2021-12-01 PROCEDURE — 3074F SYST BP LT 130 MM HG: CPT | Performed by: FAMILY MEDICINE

## 2021-12-01 RX ORDER — LEVOTHYROXINE SODIUM 0.03 MG/1
25 TABLET ORAL
Qty: 90 TABLET | Refills: 0 | Status: SHIPPED | OUTPATIENT
Start: 2021-12-01 | End: 2021-12-29

## 2021-12-01 NOTE — PROGRESS NOTES
Lorena Manzo is a 63year old female. CC:  Patient presents with: Follow - Up: Pt presents to clinic for f/up on thyroid.        HPI:    Labs subclinical hypothyroidism  No constipation or current diarrhea  Thinks hair- eyebrows thinning  Always col Alcohol use: Yes      Alcohol/week: 0.0 standard drinks      Comment: Socially     Drug use: No       ROS:  GENERAL:  No weight change, no fever, no chills, + change in energy level. HEENT:  Denies all symptoms.   NECK:   no masses, no rigidity    GI:  No

## 2021-12-01 NOTE — PATIENT INSTRUCTIONS
Start levothyroxine for thyroid tomorrow- first thing in the morning and wait 30-60 minutes before eating or other meds    Start 1/2 sertraline tablet daily in 5-7 days. Take 1/2 for one week.  Then take full tablet daily    Call / message if any proble a pillbox labeled with the days of the week. This will help you remember to take your pill each day. · Don’t take products that contain iron and calcium or antacids within 4 hours of taking your thyroid hormone pills.   · Don’t take other medicines with yo

## 2021-12-29 ENCOUNTER — VIRTUAL PHONE E/M (OUTPATIENT)
Dept: INTERNAL MEDICINE CLINIC | Facility: CLINIC | Age: 61
End: 2021-12-29
Payer: COMMERCIAL

## 2021-12-29 DIAGNOSIS — E03.8 SUBCLINICAL HYPOTHYROIDISM: Primary | ICD-10-CM

## 2021-12-29 DIAGNOSIS — F32.A DEPRESSION, UNSPECIFIED DEPRESSION TYPE: ICD-10-CM

## 2021-12-29 DIAGNOSIS — F41.9 ANXIETY: ICD-10-CM

## 2021-12-29 PROCEDURE — 99213 OFFICE O/P EST LOW 20 MIN: CPT | Performed by: FAMILY MEDICINE

## 2021-12-29 RX ORDER — LEVOTHYROXINE SODIUM 0.03 MG/1
25 TABLET ORAL
Qty: 90 TABLET | Refills: 0 | Status: SHIPPED | OUTPATIENT
Start: 2021-12-29

## 2021-12-29 NOTE — PROGRESS NOTES
Virtual Telephone Check-In  Patient verbally consents to a Virtual/Telephone Check-In visit on 12/29/21  Patient understands and accepts financial responsibility for any deductible, co-insurance and/or co-pays associated with this service.     Duration of t acute distress  Pt speaking comfortably in full sentence  Psych: normal speech       Assessment and Plan:    1. Subclinical hypothyroidism  CPM  - TSH W REFLEX TO FREE T4; Future  - levothyroxine 25 MCG Oral Tab;  Take 1 tablet (25 mcg total) by mouth befor

## 2022-02-25 NOTE — TELEPHONE ENCOUNTER
Patient is requesting new orders for her abnormal mammogram. Please call back at [phone] occasional dyspnea/fatigue

## 2022-03-05 ENCOUNTER — HOSPITAL ENCOUNTER (OUTPATIENT)
Age: 62
Discharge: HOME | End: 2022-03-05
Payer: COMMERCIAL

## 2022-03-05 ENCOUNTER — TELEPHONE (OUTPATIENT)
Dept: INTERNAL MEDICINE CLINIC | Facility: CLINIC | Age: 62
End: 2022-03-05

## 2022-03-05 VITALS
WEIGHT: 230 LBS | RESPIRATION RATE: 16 BRPM | DIASTOLIC BLOOD PRESSURE: 67 MMHG | SYSTOLIC BLOOD PRESSURE: 133 MMHG | BODY MASS INDEX: 34.07 KG/M2 | HEART RATE: 67 BPM | HEIGHT: 69 IN | TEMPERATURE: 98 F | OXYGEN SATURATION: 100 %

## 2022-03-05 DIAGNOSIS — R21 RASH: ICD-10-CM

## 2022-03-05 DIAGNOSIS — B02.9 HERPES ZOSTER WITHOUT COMPLICATION: Primary | ICD-10-CM

## 2022-03-05 PROCEDURE — 99203 OFFICE O/P NEW LOW 30 MIN: CPT | Performed by: EMERGENCY MEDICINE

## 2022-03-05 RX ORDER — VALACYCLOVIR HYDROCHLORIDE 1 G/1
1000 TABLET, FILM COATED ORAL 3 TIMES DAILY
Qty: 21 TABLET | Refills: 0 | Status: SHIPPED | OUTPATIENT
Start: 2022-03-05 | End: 2022-03-12

## 2022-03-05 RX ORDER — HYDROXYZINE HYDROCHLORIDE 25 MG/1
25 TABLET, FILM COATED ORAL EVERY 4 HOURS PRN
Qty: 20 TABLET | Refills: 0 | Status: SHIPPED | OUTPATIENT
Start: 2022-03-05

## 2022-03-05 RX ORDER — PREDNISONE 20 MG/1
40 TABLET ORAL DAILY
Qty: 10 TABLET | Refills: 0 | Status: SHIPPED | OUTPATIENT
Start: 2022-03-05 | End: 2022-03-10

## 2022-03-05 NOTE — ED INITIAL ASSESSMENT (HPI)
Pt presents to IC c/o of itchy rash on back that began on Wednesday 3/2/22. Pt reports previous hx of shingles (age 41). Pt denies shingrix vaccination. Pt denies an other symptoms.  Pt denies pain

## 2022-03-08 ENCOUNTER — NURSE ONLY (OUTPATIENT)
Dept: INTERNAL MEDICINE CLINIC | Facility: CLINIC | Age: 62
End: 2022-03-08
Payer: COMMERCIAL

## 2022-03-08 DIAGNOSIS — E03.8 SUBCLINICAL HYPOTHYROIDISM: ICD-10-CM

## 2022-03-08 LAB
T4 FREE SERPL-MCNC: 0.9 NG/DL (ref 0.8–1.7)
TSI SER-ACNC: 4.19 MIU/ML (ref 0.36–3.74)

## 2022-03-08 PROCEDURE — 84443 ASSAY THYROID STIM HORMONE: CPT | Performed by: FAMILY MEDICINE

## 2022-03-08 PROCEDURE — 84439 ASSAY OF FREE THYROXINE: CPT | Performed by: FAMILY MEDICINE

## 2022-05-19 DIAGNOSIS — E03.8 SUBCLINICAL HYPOTHYROIDISM: ICD-10-CM

## 2022-05-19 RX ORDER — LEVOTHYROXINE SODIUM 0.05 MG/1
50 TABLET ORAL
Qty: 90 TABLET | Refills: 0 | Status: SHIPPED | OUTPATIENT
Start: 2022-05-19

## 2022-05-19 NOTE — TELEPHONE ENCOUNTER
levothyroxine 50 MCG Oral Tab / pt would like a 90 day supply sent to:  [facility] [address], [address] [facility]  [address], [phone]

## 2022-07-16 DIAGNOSIS — F32.A DEPRESSION, UNSPECIFIED DEPRESSION TYPE: ICD-10-CM

## 2022-07-18 NOTE — TELEPHONE ENCOUNTER
Last OV:12-29-21  Last refill:3-24-22    Next Appt:    With [address] Tien Gregory MD)  08/11/2022 at 2:20 PM

## 2022-08-06 ENCOUNTER — LAB ENCOUNTER (OUTPATIENT)
Dept: LAB | Facility: HOSPITAL | Age: 62
End: 2022-08-06
Attending: FAMILY MEDICINE
Payer: COMMERCIAL

## 2022-08-06 DIAGNOSIS — E03.8 SUBCLINICAL HYPOTHYROIDISM: ICD-10-CM

## 2022-08-06 DIAGNOSIS — E03.8 SUBCLINICAL HYPOTHYROIDISM: Primary | ICD-10-CM

## 2022-08-06 LAB
T4 FREE SERPL-MCNC: 0.9 NG/DL (ref 0.8–1.7)
TSI SER-ACNC: 4.03 MIU/ML (ref 0.36–3.74)

## 2022-08-06 PROCEDURE — 84439 ASSAY OF FREE THYROXINE: CPT

## 2022-08-06 PROCEDURE — 36415 COLL VENOUS BLD VENIPUNCTURE: CPT

## 2022-08-06 PROCEDURE — 84443 ASSAY THYROID STIM HORMONE: CPT

## 2022-08-06 RX ORDER — LEVOTHYROXINE SODIUM 0.07 MG/1
75 TABLET ORAL
Qty: 30 TABLET | Refills: 1 | Status: SHIPPED | OUTPATIENT
Start: 2022-08-06

## 2022-08-11 DIAGNOSIS — E03.8 SUBCLINICAL HYPOTHYROIDISM: ICD-10-CM

## 2022-08-11 RX ORDER — LEVOTHYROXINE SODIUM 0.05 MG/1
TABLET ORAL
Qty: 90 TABLET | Refills: 0 | OUTPATIENT
Start: 2022-08-11

## 2022-08-11 NOTE — TELEPHONE ENCOUNTER
Last OV:3-9-22  Last refill:12-29-21 virtual     The original prescription was discontinued on 8/6/2022 by Tabatha Galloway MD. Renewing this prescription may not be appropriate.

## 2022-08-23 DIAGNOSIS — F41.9 ANXIETY: ICD-10-CM

## 2022-08-23 RX ORDER — ALPRAZOLAM 0.25 MG/1
0.25 TABLET ORAL 2 TIMES DAILY PRN
Qty: 20 TABLET | Refills: 0 | Status: SHIPPED | OUTPATIENT
Start: 2022-08-23

## 2022-11-07 ENCOUNTER — LAB ENCOUNTER (OUTPATIENT)
Dept: LAB | Facility: HOSPITAL | Age: 62
End: 2022-11-07
Attending: FAMILY MEDICINE
Payer: COMMERCIAL

## 2022-11-07 DIAGNOSIS — E03.8 SUBCLINICAL HYPOTHYROIDISM: ICD-10-CM

## 2022-11-07 LAB
T4 FREE SERPL-MCNC: 1 NG/DL (ref 0.8–1.7)
TSI SER-ACNC: 3.95 MIU/ML (ref 0.36–3.74)

## 2022-11-07 PROCEDURE — 84439 ASSAY OF FREE THYROXINE: CPT

## 2022-11-07 PROCEDURE — 84443 ASSAY THYROID STIM HORMONE: CPT

## 2022-11-07 PROCEDURE — 36415 COLL VENOUS BLD VENIPUNCTURE: CPT

## 2022-12-01 ENCOUNTER — OFFICE VISIT (OUTPATIENT)
Dept: INTERNAL MEDICINE CLINIC | Facility: CLINIC | Age: 62
End: 2022-12-01
Payer: COMMERCIAL

## 2022-12-01 VITALS
BODY MASS INDEX: 37.03 KG/M2 | DIASTOLIC BLOOD PRESSURE: 78 MMHG | SYSTOLIC BLOOD PRESSURE: 126 MMHG | HEART RATE: 76 BPM | HEIGHT: 69 IN | WEIGHT: 250 LBS | OXYGEN SATURATION: 98 %

## 2022-12-01 DIAGNOSIS — E66.9 OBESITY (BMI 30-39.9): ICD-10-CM

## 2022-12-01 DIAGNOSIS — Z00.00 PHYSICAL EXAM: Primary | ICD-10-CM

## 2022-12-01 DIAGNOSIS — E03.9 HYPOTHYROIDISM, UNSPECIFIED TYPE: ICD-10-CM

## 2022-12-01 DIAGNOSIS — R00.2 PALPITATIONS: ICD-10-CM

## 2022-12-01 DIAGNOSIS — Z12.31 ENCOUNTER FOR SCREENING MAMMOGRAM FOR MALIGNANT NEOPLASM OF BREAST: ICD-10-CM

## 2022-12-01 DIAGNOSIS — Z12.11 COLON CANCER SCREENING: ICD-10-CM

## 2022-12-01 DIAGNOSIS — R06.83 SNORING: ICD-10-CM

## 2022-12-01 LAB
ALBUMIN SERPL-MCNC: 3.7 G/DL (ref 3.4–5)
ALBUMIN/GLOB SERPL: 0.9 {RATIO} (ref 1–2)
ALP LIVER SERPL-CCNC: 124 U/L
ALT SERPL-CCNC: 45 U/L
ANION GAP SERPL CALC-SCNC: 7 MMOL/L (ref 0–18)
AST SERPL-CCNC: 33 U/L (ref 15–37)
ATRIAL RATE: 69 BPM
BASOPHILS # BLD AUTO: 0.06 X10(3) UL (ref 0–0.2)
BASOPHILS NFR BLD AUTO: 0.5 %
BILIRUB SERPL-MCNC: 0.4 MG/DL (ref 0.1–2)
BUN BLD-MCNC: 19 MG/DL (ref 7–18)
BUN/CREAT SERPL: 21.8 (ref 10–20)
CALCIUM BLD-MCNC: 9.9 MG/DL (ref 8.5–10.1)
CHLORIDE SERPL-SCNC: 106 MMOL/L (ref 98–112)
CHOLEST SERPL-MCNC: 225 MG/DL (ref ?–200)
CO2 SERPL-SCNC: 27 MMOL/L (ref 21–32)
CREAT BLD-MCNC: 0.87 MG/DL
DEPRECATED RDW RBC AUTO: 49.9 FL (ref 35.1–46.3)
EOSINOPHIL # BLD AUTO: 0.13 X10(3) UL (ref 0–0.7)
EOSINOPHIL NFR BLD AUTO: 1.2 %
ERYTHROCYTE [DISTWIDTH] IN BLOOD BY AUTOMATED COUNT: 13.6 % (ref 11–15)
FASTING PATIENT LIPID ANSWER: YES
FASTING STATUS PATIENT QL REPORTED: YES
GFR SERPLBLD BASED ON 1.73 SQ M-ARVRAT: 76 ML/MIN/1.73M2 (ref 60–?)
GLOBULIN PLAS-MCNC: 4.3 G/DL (ref 2.8–4.4)
GLUCOSE BLD-MCNC: 71 MG/DL (ref 70–99)
HCT VFR BLD AUTO: 46.6 %
HDLC SERPL-MCNC: 87 MG/DL (ref 40–59)
HGB BLD-MCNC: 14.2 G/DL
IMM GRANULOCYTES # BLD AUTO: 0.04 X10(3) UL (ref 0–1)
IMM GRANULOCYTES NFR BLD: 0.4 %
LDLC SERPL CALC-MCNC: 120 MG/DL (ref ?–100)
LYMPHOCYTES # BLD AUTO: 3.07 X10(3) UL (ref 1–4)
LYMPHOCYTES NFR BLD AUTO: 27.8 %
MCH RBC QN AUTO: 30.1 PG (ref 26–34)
MCHC RBC AUTO-ENTMCNC: 30.5 G/DL (ref 31–37)
MCV RBC AUTO: 98.7 FL
MONOCYTES # BLD AUTO: 0.81 X10(3) UL (ref 0.1–1)
MONOCYTES NFR BLD AUTO: 7.3 %
NEUTROPHILS # BLD AUTO: 6.95 X10 (3) UL (ref 1.5–7.7)
NEUTROPHILS # BLD AUTO: 6.95 X10(3) UL (ref 1.5–7.7)
NEUTROPHILS NFR BLD AUTO: 62.8 %
NONHDLC SERPL-MCNC: 138 MG/DL (ref ?–130)
OSMOLALITY SERPL CALC.SUM OF ELEC: 291 MOSM/KG (ref 275–295)
P AXIS: 54 DEGREES
P-R INTERVAL: 152 MS
PLATELET # BLD AUTO: 331 10(3)UL (ref 150–450)
POTASSIUM SERPL-SCNC: 4.3 MMOL/L (ref 3.5–5.1)
PROT SERPL-MCNC: 8 G/DL (ref 6.4–8.2)
Q-T INTERVAL: 382 MS
QRS DURATION: 86 MS
QTC CALCULATION (BEZET): 409 MS
R AXIS: -9 DEGREES
RBC # BLD AUTO: 4.72 X10(6)UL
SODIUM SERPL-SCNC: 140 MMOL/L (ref 136–145)
T AXIS: 38 DEGREES
T4 FREE SERPL-MCNC: 1.1 NG/DL (ref 0.8–1.7)
TRIGL SERPL-MCNC: 102 MG/DL (ref 30–149)
TSI SER-ACNC: 2.85 MIU/ML (ref 0.36–3.74)
VENTRICULAR RATE: 69 BPM
VLDLC SERPL CALC-MCNC: 18 MG/DL (ref 0–30)
WBC # BLD AUTO: 11.1 X10(3) UL (ref 4–11)

## 2022-12-01 PROCEDURE — 3074F SYST BP LT 130 MM HG: CPT | Performed by: FAMILY MEDICINE

## 2022-12-01 PROCEDURE — 84439 ASSAY OF FREE THYROXINE: CPT | Performed by: FAMILY MEDICINE

## 2022-12-01 PROCEDURE — 80050 GENERAL HEALTH PANEL: CPT | Performed by: FAMILY MEDICINE

## 2022-12-01 PROCEDURE — 90471 IMMUNIZATION ADMIN: CPT | Performed by: FAMILY MEDICINE

## 2022-12-01 PROCEDURE — 3078F DIAST BP <80 MM HG: CPT | Performed by: FAMILY MEDICINE

## 2022-12-01 PROCEDURE — 99396 PREV VISIT EST AGE 40-64: CPT | Performed by: FAMILY MEDICINE

## 2022-12-01 PROCEDURE — 3008F BODY MASS INDEX DOCD: CPT | Performed by: FAMILY MEDICINE

## 2022-12-01 PROCEDURE — 90686 IIV4 VACC NO PRSV 0.5 ML IM: CPT | Performed by: FAMILY MEDICINE

## 2022-12-01 PROCEDURE — 80061 LIPID PANEL: CPT | Performed by: FAMILY MEDICINE

## 2022-12-01 PROCEDURE — 93000 ELECTROCARDIOGRAM COMPLETE: CPT | Performed by: FAMILY MEDICINE

## 2022-12-03 RX ORDER — LEVOTHYROXINE SODIUM 88 UG/1
88 TABLET ORAL
Qty: 90 TABLET | Refills: 3 | Status: SHIPPED | OUTPATIENT
Start: 2022-12-03

## 2022-12-14 LAB
ATRIAL RATE: 69 BPM
P AXIS: 54 DEGREES
P-R INTERVAL: 152 MS
Q-T INTERVAL: 382 MS
QRS DURATION: 86 MS
QTC CALCULATION (BEZET): 409 MS
R AXIS: -9 DEGREES
T AXIS: 38 DEGREES
VENTRICULAR RATE: 69 BPM

## 2022-12-16 ENCOUNTER — HOSPITAL ENCOUNTER (OUTPATIENT)
Dept: MAMMOGRAPHY | Age: 62
Discharge: HOME | End: 2022-12-16
Attending: FAMILY MEDICINE
Payer: COMMERCIAL

## 2022-12-16 DIAGNOSIS — Z12.31 ENCOUNTER FOR SCREENING MAMMOGRAM FOR MALIGNANT NEOPLASM OF BREAST: ICD-10-CM

## 2022-12-16 PROCEDURE — 77063 BREAST TOMOSYNTHESIS BI: CPT | Performed by: FAMILY MEDICINE

## 2022-12-16 PROCEDURE — 77067 SCR MAMMO BI INCL CAD: CPT | Performed by: FAMILY MEDICINE

## 2023-03-30 ENCOUNTER — OFFICE VISIT (OUTPATIENT)
Dept: SURGERY | Facility: CLINIC | Age: 63
End: 2023-03-30
Payer: COMMERCIAL

## 2023-03-30 VITALS
OXYGEN SATURATION: 96 % | DIASTOLIC BLOOD PRESSURE: 70 MMHG | SYSTOLIC BLOOD PRESSURE: 112 MMHG | HEIGHT: 68.3 IN | HEART RATE: 60 BPM | WEIGHT: 254 LBS | BODY MASS INDEX: 38.49 KG/M2

## 2023-03-30 DIAGNOSIS — E03.9 HYPOTHYROIDISM, UNSPECIFIED TYPE: ICD-10-CM

## 2023-03-30 DIAGNOSIS — E66.9 OBESITY (BMI 30-39.9): ICD-10-CM

## 2023-03-30 DIAGNOSIS — F41.9 ANXIETY: ICD-10-CM

## 2023-03-30 DIAGNOSIS — E78.5 DYSLIPIDEMIA: Primary | ICD-10-CM

## 2023-03-30 PROCEDURE — 3008F BODY MASS INDEX DOCD: CPT | Performed by: NURSE PRACTITIONER

## 2023-03-30 PROCEDURE — 3074F SYST BP LT 130 MM HG: CPT | Performed by: NURSE PRACTITIONER

## 2023-03-30 PROCEDURE — 3078F DIAST BP <80 MM HG: CPT | Performed by: NURSE PRACTITIONER

## 2023-03-30 PROCEDURE — 99214 OFFICE O/P EST MOD 30 MIN: CPT | Performed by: NURSE PRACTITIONER

## 2023-03-30 RX ORDER — SEMAGLUTIDE 0.25 MG/.5ML
0.25 INJECTION, SOLUTION SUBCUTANEOUS WEEKLY
Qty: 4 EACH | Refills: 1 | Status: SHIPPED | OUTPATIENT
Start: 2023-03-30

## 2023-03-30 NOTE — PATIENT INSTRUCTIONS
[facility] schedule:    0.25 mg/week x 4 weeks  0.5 mg/week x 4 weeks  1 mg/week x 4 weeks  1.7 mg/week x 4 weeks  2.4 mg/week thereafter    I recommend a whole food, plant powered diet with low glycemic index:     Aim for 3 meals a day and 1-2 snacks as needed. Aim for a protein + produce at each meal time. Keep meals between 7 am and 7 pm.     Breakfast ideas:  1. Fruit and nuts/seeds. 2. Eggs scrambled with vegetables. 3. Oatmeal (stovetop), cinnamon, 2 tbsp flaxseed, berries, nuts. 4. Protein shake + fruit. (1 scoop with water/ice). [place] [address], [place], [place], and [place] are good brands. Snacks:  Raw vegetables and hummus, apples and peanut butter, nuts, seeds, fruit, pecans drizzled with organic honey. Use the Healthy Plate method for lunch and dinner:  1/2 right side of plate non-starchy vegetables. Bottom left 1/4 plate protein. Top left 1/4 starch as desired. Aim for a total of:  2 fruits a day (avocado, tomato, citrus/oranges, apples, berries)  1/2 cup or medium size    4 non-starchy vegetables (handful) (greens, peppers, onions, garlic, broccoli, cauliflower, etc.)    0-2 starches (oatmeal, sweet potato, carrots, brown rice, etc). 3 protein per day (fish, seafood, meat, or plants: salmon, nuts, seeds, shrimp, chicken, turkey, beans, lentils, chickpeas, etc).     2 healthy fats (avocado, avocado oil, olives, olive oil, salmon, nuts, seeds)

## 2023-03-31 ENCOUNTER — TELEPHONE (OUTPATIENT)
Dept: SURGERY | Facility: CLINIC | Age: 63
End: 2023-03-31

## 2023-04-13 ENCOUNTER — OFFICE VISIT (OUTPATIENT)
Dept: INTEGRATIVE MEDICINE | Facility: CLINIC | Age: 63
End: 2023-04-13
Payer: COMMERCIAL

## 2023-04-13 VITALS — BODY MASS INDEX: 38 KG/M2 | WEIGHT: 252 LBS

## 2023-04-13 DIAGNOSIS — Z72.3 LACK OF PHYSICAL ACTIVITY: ICD-10-CM

## 2023-04-13 DIAGNOSIS — Z71.3 ENCOUNTER FOR WEIGHT LOSS COUNSELING: Primary | ICD-10-CM

## 2023-04-13 DIAGNOSIS — E66.9 OBESITY (BMI 30-39.9): ICD-10-CM

## 2023-04-13 DIAGNOSIS — E03.9 HYPOTHYROIDISM, UNSPECIFIED TYPE: ICD-10-CM

## 2023-04-13 DIAGNOSIS — E78.5 DYSLIPIDEMIA: ICD-10-CM

## 2023-04-13 PROCEDURE — 97802 MEDICAL NUTRITION INDIV IN: CPT

## 2023-04-13 NOTE — PATIENT INSTRUCTIONS
Meal Monday Tuesday Wednesday Thursday Friday Saturday/Sunday   Breakfast Steel cut oats  + apple, cinnamon, walnuts, almond milk Egg Muffin Bites + spinach + tomato + meat Steel cut oats  + apple, cinnamon, walnuts, almond milk Egg Muffin Bites + spinach + tomato + meat Steel cut oats  + apple, cinnamon, walnuts, almond milk Egg Muffin Bites + spinach + tomato + meat   Lunch Salad Kit  + protein (rotisserie chicken, beans, nuts, seeds, legumes, cottage cheese) Smoothie: spinach, apple, banana, + protein shake Salad Kit  + protein (rotisserie chicken, beans, nuts, seeds, legumes, cottage cheese) Smoothie: spinach, apple, banana, + protein shake Salad Kit  + protein (rotisserie chicken, beans, nuts, seeds, legumes, cottage cheese)    Dinner Daughter covering dinner Daughter covering dinner Daughter covering dinner Daughter covering dinner Daughter covering dinner Daughter covering dinner          Here are some recipes to get you started following the Mediterranean [email or web address]      [facility]'s virtual dispensary, powered by [facility]    We are proud to offer you a trusted source to purchase your recommended vitamins and supplements at an exclusive 10% discount. To access our catalog of recommended products, please go to [email or web address] and select the \"Log In\" and then use your email address to complete creating your personal account. Please call [facility] at [phone], if you need direct help.

## 2023-05-04 ENCOUNTER — OFFICE VISIT (OUTPATIENT)
Dept: INTEGRATIVE MEDICINE | Facility: CLINIC | Age: 63
End: 2023-05-04
Payer: COMMERCIAL

## 2023-05-04 VITALS — BODY MASS INDEX: 37 KG/M2 | WEIGHT: 247 LBS

## 2023-05-04 DIAGNOSIS — Z71.3 ENCOUNTER FOR WEIGHT LOSS COUNSELING: Primary | ICD-10-CM

## 2023-05-04 DIAGNOSIS — E78.5 DYSLIPIDEMIA: ICD-10-CM

## 2023-05-04 DIAGNOSIS — E66.9 OBESITY (BMI 35.0-39.9 WITHOUT COMORBIDITY): ICD-10-CM

## 2023-05-04 PROCEDURE — 97803 MED NUTRITION INDIV SUBSEQ: CPT

## 2023-05-15 DIAGNOSIS — E66.9 OBESITY (BMI 30-39.9): Primary | ICD-10-CM

## 2023-05-15 RX ORDER — SEMAGLUTIDE 0.5 MG/.5ML
0.5 INJECTION, SOLUTION SUBCUTANEOUS WEEKLY
Qty: 4 EACH | Refills: 1 | Status: SHIPPED | OUTPATIENT
Start: 2023-05-15

## 2023-05-18 DIAGNOSIS — E66.9 OBESITY (BMI 30-39.9): ICD-10-CM

## 2023-05-19 ENCOUNTER — TELEPHONE (OUTPATIENT)
Dept: SURGERY | Facility: CLINIC | Age: 63
End: 2023-05-19

## 2023-05-19 DIAGNOSIS — E66.9 OBESITY (BMI 30-39.9): ICD-10-CM

## 2023-05-19 RX ORDER — SEMAGLUTIDE 0.25 MG/.5ML
0.25 INJECTION, SOLUTION SUBCUTANEOUS WEEKLY
Qty: 4 EACH | Refills: 1 | Status: SHIPPED | OUTPATIENT
Start: 2023-05-19

## 2023-05-31 ENCOUNTER — TELEMEDICINE (OUTPATIENT)
Dept: SURGERY | Facility: CLINIC | Age: 63
End: 2023-05-31
Payer: COMMERCIAL

## 2023-05-31 VITALS — WEIGHT: 243 LBS | BODY MASS INDEX: 37 KG/M2

## 2023-05-31 DIAGNOSIS — Z51.81 ENCOUNTER FOR THERAPEUTIC DRUG MONITORING: ICD-10-CM

## 2023-05-31 DIAGNOSIS — E03.9 HYPOTHYROIDISM, UNSPECIFIED TYPE: ICD-10-CM

## 2023-05-31 DIAGNOSIS — E78.5 DYSLIPIDEMIA: Primary | ICD-10-CM

## 2023-05-31 DIAGNOSIS — E66.9 OBESITY (BMI 30-39.9): ICD-10-CM

## 2023-05-31 DIAGNOSIS — F41.9 ANXIETY: ICD-10-CM

## 2023-05-31 PROCEDURE — 99214 OFFICE O/P EST MOD 30 MIN: CPT | Performed by: NURSE PRACTITIONER

## 2023-06-01 ENCOUNTER — OFFICE VISIT (OUTPATIENT)
Dept: INTEGRATIVE MEDICINE | Facility: CLINIC | Age: 63
End: 2023-06-01
Payer: COMMERCIAL

## 2023-06-01 VITALS — BODY MASS INDEX: 36 KG/M2 | WEIGHT: 242 LBS

## 2023-06-01 DIAGNOSIS — E78.5 DYSLIPIDEMIA: ICD-10-CM

## 2023-06-01 DIAGNOSIS — Z71.3 ENCOUNTER FOR WEIGHT LOSS COUNSELING: ICD-10-CM

## 2023-06-01 DIAGNOSIS — E66.9 OBESITY (BMI 35.0-39.9 WITHOUT COMORBIDITY): Primary | ICD-10-CM

## 2023-06-01 NOTE — PATIENT INSTRUCTIONS
Exercise Plan:  - 2 days working the a , 7,500 steps 3 days a week.    - have an afternoon snack before dinner  - make the egg bites in the muffin tin  - create freezer friendly healthy sweets or create a cacao beverage

## 2023-06-16 DIAGNOSIS — E66.9 OBESITY (BMI 30-39.9): Primary | ICD-10-CM

## 2023-06-16 RX ORDER — SEMAGLUTIDE 1 MG/.5ML
1 INJECTION, SOLUTION SUBCUTANEOUS WEEKLY
Qty: 4 EACH | Refills: 1 | Status: SHIPPED | OUTPATIENT
Start: 2023-06-16

## 2023-07-06 ENCOUNTER — OFFICE VISIT (OUTPATIENT)
Dept: SURGERY | Facility: CLINIC | Age: 63
End: 2023-07-06
Payer: COMMERCIAL

## 2023-07-06 VITALS
HEIGHT: 68.3 IN | DIASTOLIC BLOOD PRESSURE: 70 MMHG | OXYGEN SATURATION: 94 % | WEIGHT: 238 LBS | SYSTOLIC BLOOD PRESSURE: 116 MMHG | BODY MASS INDEX: 36.07 KG/M2 | HEART RATE: 63 BPM

## 2023-07-06 DIAGNOSIS — Z51.81 ENCOUNTER FOR THERAPEUTIC DRUG MONITORING: ICD-10-CM

## 2023-07-06 DIAGNOSIS — F41.9 ANXIETY: ICD-10-CM

## 2023-07-06 DIAGNOSIS — E78.5 DYSLIPIDEMIA: Primary | ICD-10-CM

## 2023-07-06 DIAGNOSIS — E03.9 HYPOTHYROIDISM, UNSPECIFIED TYPE: ICD-10-CM

## 2023-07-06 DIAGNOSIS — E66.9 OBESITY (BMI 30-39.9): ICD-10-CM

## 2023-07-06 PROCEDURE — 99214 OFFICE O/P EST MOD 30 MIN: CPT | Performed by: NURSE PRACTITIONER

## 2023-07-06 PROCEDURE — 3008F BODY MASS INDEX DOCD: CPT | Performed by: NURSE PRACTITIONER

## 2023-07-06 PROCEDURE — 3074F SYST BP LT 130 MM HG: CPT | Performed by: NURSE PRACTITIONER

## 2023-07-06 PROCEDURE — 3078F DIAST BP <80 MM HG: CPT | Performed by: NURSE PRACTITIONER

## 2023-07-13 ENCOUNTER — OFFICE VISIT (OUTPATIENT)
Dept: INTEGRATIVE MEDICINE | Facility: CLINIC | Age: 63
End: 2023-07-13
Payer: COMMERCIAL

## 2023-07-13 VITALS — WEIGHT: 235.81 LBS | BODY MASS INDEX: 36 KG/M2

## 2023-07-13 DIAGNOSIS — Z72.3 LACK OF PHYSICAL ACTIVITY: ICD-10-CM

## 2023-07-13 DIAGNOSIS — Z71.3 ENCOUNTER FOR WEIGHT LOSS COUNSELING: ICD-10-CM

## 2023-07-13 DIAGNOSIS — E66.9 OBESITY (BMI 35.0-39.9 WITHOUT COMORBIDITY): Primary | ICD-10-CM

## 2023-07-13 DIAGNOSIS — E03.9 HYPOTHYROIDISM, UNSPECIFIED TYPE: ICD-10-CM

## 2023-07-13 DIAGNOSIS — E66.9 OBESITY (BMI 30-39.9): ICD-10-CM

## 2023-07-13 DIAGNOSIS — E78.5 DYSLIPIDEMIA: ICD-10-CM

## 2023-07-31 RX ORDER — SEMAGLUTIDE 1.7 MG/.75ML
1.7 INJECTION, SOLUTION SUBCUTANEOUS WEEKLY
Qty: 4 EACH | Refills: 1 | Status: SHIPPED | OUTPATIENT
Start: 2023-07-31

## 2023-08-03 ENCOUNTER — E-VISIT (OUTPATIENT)
Dept: TELEHEALTH | Age: 63
End: 2023-08-03

## 2023-08-03 DIAGNOSIS — R39.9 UTI SYMPTOMS: Primary | ICD-10-CM

## 2023-08-03 PROCEDURE — 99421 OL DIG E/M SVC 5-10 MIN: CPT | Performed by: NURSE PRACTITIONER

## 2023-08-03 RX ORDER — SULFAMETHOXAZOLE AND TRIMETHOPRIM 800; 160 MG/1; MG/1
1 TABLET ORAL 2 TIMES DAILY
Qty: 14 TABLET | Refills: 0 | Status: SHIPPED | OUTPATIENT
Start: 2023-08-03 | End: 2023-08-10

## 2023-08-03 NOTE — PROGRESS NOTES
Denise Brown is a 66year old female. HPI:   See answers to questions above. Current Outpatient Medications   Medication Sig Dispense Refill    sulfamethoxazole-trimethoprim -160 MG Oral Tab per tablet Take 1 tablet by mouth 2 (two) times daily for 7 days. 14 tablet 0    semaglutide-weight management (WEGOVY) 1.7 MG/0.75ML Subcutaneous Solution Auto-injector Inject 0.75 mL (1.7 mg total) into the skin once a week. 4 each 1    Meloxicam 15 MG Oral Tab Take 1 tablet (15 mg total) by mouth daily as needed for Pain. 30 tablet 0    levothyroxine 88 MCG Oral Tab Take 1 tablet (88 mcg total) by mouth before breakfast. 90 tablet 3    ALPRAZolam (XANAX) 0.25 MG Oral Tab Take 1 tablet (0.25 mg total) by mouth 2 (two) times daily as needed for Anxiety. 20 tablet 0    DAILY MULTIPLE VITAMINS Oral Tab Take 1 tablet by mouth daily. Past Medical History:   Diagnosis Date    Anxiety     Thyroid disease       Past Surgical History:   Procedure Laterality Date    KNEE SURGERY Bilateral     BREANNE BIOPSY STEREO NODULE 1 SITE RIGHT (CPT=19081)        Family History   Problem Relation Age of Onset    Hypertension Father     Heart Disease Father     Other (afib) Father          80      Social History:  Social History     Socioeconomic History    Marital status:    Tobacco Use    Smoking status: Never    Smokeless tobacco: Never   Vaping Use    Vaping Use: Never used   Substance and Sexual Activity    Alcohol use:  Yes     Alcohol/week: 0.0 standard drinks of alcohol     Comment: Socially     Drug use: No   Other Topics Concern    Caffeine Concern Yes     Comment: Coffee, soda - 4 cups per day          ASSESSMENT AND PLAN:     Uti symptoms  (primary encounter diagnosis)        Meds & Refills for this Visit:  Requested Prescriptions     Signed Prescriptions Disp Refills    sulfamethoxazole-trimethoprim -160 MG Oral Tab per tablet 14 tablet 0     Sig: Take 1 tablet by mouth 2 (two) times daily for 7 days.      E-Visit Times and Charging  Reviewing Chart & QNR: 5 minutes  Plan: 3 minutes  Note: 1 minutes  My total time spent caring for the patient for E-Visit: 9 minutes - 51027  Charges: [id number]         Patient advised to follow up with PCP if no improvement or worsening of symptoms  Refer to [employer] message for specific patient instructions Statement Selected

## 2023-09-13 DIAGNOSIS — F41.9 ANXIETY: ICD-10-CM

## 2023-09-14 DIAGNOSIS — E66.9 OBESITY (BMI 30-39.9): Primary | ICD-10-CM

## 2023-09-14 RX ORDER — MELOXICAM 15 MG/1
15 TABLET ORAL DAILY PRN
Qty: 30 TABLET | Refills: 0 | Status: SHIPPED | OUTPATIENT
Start: 2023-09-14

## 2023-09-14 RX ORDER — SEMAGLUTIDE 2.4 MG/.75ML
2.4 INJECTION, SOLUTION SUBCUTANEOUS WEEKLY
Qty: 4 EACH | Refills: 2 | Status: SHIPPED | OUTPATIENT
Start: 2023-09-14

## 2023-09-14 RX ORDER — MELOXICAM 15 MG/1
15 TABLET ORAL DAILY PRN
Qty: 30 TABLET | Refills: 0 | OUTPATIENT
Start: 2023-09-14

## 2023-09-14 RX ORDER — ALPRAZOLAM 0.25 MG/1
0.25 TABLET ORAL 2 TIMES DAILY PRN
Qty: 20 TABLET | Refills: 0 | Status: SHIPPED | OUTPATIENT
Start: 2023-09-14

## 2023-11-10 ENCOUNTER — OFFICE VISIT (OUTPATIENT)
Dept: SURGERY | Facility: CLINIC | Age: 63
End: 2023-11-10
Payer: COMMERCIAL

## 2023-11-10 VITALS
WEIGHT: 215 LBS | SYSTOLIC BLOOD PRESSURE: 110 MMHG | DIASTOLIC BLOOD PRESSURE: 80 MMHG | OXYGEN SATURATION: 97 % | HEIGHT: 68.3 IN | BODY MASS INDEX: 32.58 KG/M2 | HEART RATE: 82 BPM

## 2023-11-10 DIAGNOSIS — E03.9 HYPOTHYROIDISM, UNSPECIFIED TYPE: ICD-10-CM

## 2023-11-10 DIAGNOSIS — E66.9 OBESITY (BMI 30-39.9): ICD-10-CM

## 2023-11-10 DIAGNOSIS — F41.9 ANXIETY: ICD-10-CM

## 2023-11-10 DIAGNOSIS — Z51.81 ENCOUNTER FOR THERAPEUTIC DRUG MONITORING: ICD-10-CM

## 2023-11-10 DIAGNOSIS — E78.5 DYSLIPIDEMIA: Primary | ICD-10-CM

## 2023-11-10 PROCEDURE — 3074F SYST BP LT 130 MM HG: CPT | Performed by: NURSE PRACTITIONER

## 2023-11-10 PROCEDURE — 3079F DIAST BP 80-89 MM HG: CPT | Performed by: NURSE PRACTITIONER

## 2023-11-10 PROCEDURE — 99214 OFFICE O/P EST MOD 30 MIN: CPT | Performed by: NURSE PRACTITIONER

## 2023-11-10 PROCEDURE — 3008F BODY MASS INDEX DOCD: CPT | Performed by: NURSE PRACTITIONER

## 2023-11-10 RX ORDER — SEMAGLUTIDE 2.4 MG/.75ML
2.4 INJECTION, SOLUTION SUBCUTANEOUS WEEKLY
Qty: 9 ML | Refills: 1 | Status: SHIPPED | OUTPATIENT
Start: 2023-11-10

## 2023-12-18 RX ORDER — LEVOTHYROXINE SODIUM 88 UG/1
88 TABLET ORAL
Qty: 90 TABLET | Refills: 0 | Status: SHIPPED | OUTPATIENT
Start: 2023-12-18

## 2023-12-18 NOTE — TELEPHONE ENCOUNTER
A refill request was received for:  Requested Prescriptions     Pending Prescriptions Disp Refills    LEVOTHYROXINE 88 MCG Oral Tab [Pharmacy Med Name: LEVOTHYROXINE 88 MCG TABLET] 90 tablet 3     Sig: TAKE 1 TABLET BY MOUTH BEFORE BREAKFAST.      Last refill date:  12/3/22    Last office visit: 12/1/22      Future Appointments   Date Time Provider Rebecca Del Cid   1/13/2024  9:20 AM Andre Camilo MD [address]   3/7/2024  2:20 PM HEAVENLY Moore [address] [facility]

## 2023-12-26 ENCOUNTER — ORDER TRANSCRIPTION (OUTPATIENT)
Dept: PHYSICAL THERAPY | Facility: HOSPITAL | Age: 63
End: 2023-12-26

## 2023-12-26 DIAGNOSIS — M25.569 KNEE PAIN: Primary | ICD-10-CM

## 2024-01-11 ENCOUNTER — TELEPHONE (OUTPATIENT)
Dept: INTERNAL MEDICINE CLINIC | Facility: CLINIC | Age: 64
End: 2024-01-11

## 2024-01-17 ENCOUNTER — TELEPHONE (OUTPATIENT)
Dept: PHYSICAL THERAPY | Facility: HOSPITAL | Age: 64
End: 2024-01-17

## 2024-01-18 ENCOUNTER — OFFICE VISIT (OUTPATIENT)
Dept: PHYSICAL THERAPY | Facility: HOSPITAL | Age: 64
End: 2024-01-18
Attending: ORTHOPAEDIC SURGERY
Payer: COMMERCIAL

## 2024-01-18 DIAGNOSIS — M25.569 KNEE PAIN: Primary | ICD-10-CM

## 2024-01-18 PROCEDURE — 97161 PT EVAL LOW COMPLEX 20 MIN: CPT

## 2024-01-18 PROCEDURE — 97110 THERAPEUTIC EXERCISES: CPT

## 2024-01-18 NOTE — PROGRESS NOTES
LOWER EXTREMITY EVALUATION:     Diagnosis:   Knee pain (M25.569)       Referring Provider: Esa  Date of Evaluation:    1/17/2024    Precautions:  None Next MD visit:   none scheduled  Date of Surgery: n/a     PATIENT SUMMARY   Parris Paredes is a 63 year old female who presents to therapy today with complaints of chronic bilateral knee pain. Patient states that she will experience episodes of symptoms in which she will be bending/squatting and feel popping/cracking in knee and within the next day or so, she will feel pain and will notice swelling. These usually happen 1x/month and each episode usually lasts for 24 hours. Patient would take Meloxicam and this usually would help. With most recent exacerbation, she states that swelling didn't seem to go away. She saw Dr. See and had X-rays and was prescribed physical therapy. Patient works with  2x/week and this is helpful.   Pt describes pain level current 0/10, at best 0/10, at worst 6/10. Patient will experience pain along anterolateral aspect of knees. This will happen.   Current functional limitations include floor transfers, deep squatting, leg press.     Parris describes prior level of function as independent and active. She works fully remote. Patient enjoys working with  2x/week. Pt goals include getting up off the ground, return to exercise pain-free.   Past medical history was reviewed with Parris. Significant findings include: hx of patellar dislocations and patellar tendon transfers.     ASSESSMENT  Parris presents to physical therapy evaluation with primary c/o bilateral anterior knee pain. The results of the objective tests and measures show quad weakness, patellofemoral hypomobility, gluteal weakness, and dysfunctional movement mechanics.  Functional deficits include but are not limited to squatting and floor transfers.  Signs and symptoms are consistent with diagnosis of patellofemoral dysfunction. Pt  and PT discussed evaluation findings, pathology, POC and HEP.  Pt voiced understanding and performs HEP correctly without reported pain. Skilled Physical Therapy is medically necessary to address the above impairments and reach functional goals.     OBJECTIVE:   Observation: B knees negative for swelling; noted surgical scar from previous MPFL procedure bilaterally; mild knee valgus noted in supine  Palpation: negative for medial joint line tenderness   Sensation: light touch intact    AROM: (* denotes performed with pain)  Knee   Flexion: R 130*; L 130*  Extension: R -2; L -2        Accessory motion: patient apprehensive with attempted assessment of patellofemoral joint mobility;       Strength/MMT: (* denotes performed with pain)  Hip Knee   Flexion: R 5/5; L 5/5  Extension: R 3+/5; L 3+/5  Abduction: R 3+/5; L 4-/5  ER: R 5/5; L 5/5  IR: R 5/5; L 5/5 Flexion: R 5/5; L 5/5  Extension: R 4-*/5; L 4/5        Special tests:   Apley Compression: increase in anterior knee pain  Joint line palpation: negative    Gait: pt ambulates on level ground with decreased step length   and trendelenburg/waddle  Balance: SLS: R 12 sec, L 11 sec    Today’s Treatment and Response:   Pt education was provided on exam findings, treatment diagnosis, treatment plan, expectations, and prognosis. Pt was also provided recommendations for activity modifications, possible soreness after evaluation, modalities as needed [ice/heat], postural corrections, pain science education , detrimental fear avoidance behaviors, and importance of remaining active.  Patient was instructed in and issued a HEP for: None at this vist    Charges: PT Mallorieal Low Complexity, TE x 1      Total Timed Treatment: 10 min     Total Treatment Time: 46 min     Based on clinical rationale and outcome measures, this evaluation involved Low Complexity decision making.  PLAN OF CARE:    Goals: (to be met in 10 visits)  Pt will improve knee extension ROM to 0 deg to allow proper  heel strike during gait and terminal knee extension in stance   Pt will improve knee AROM flexion to >125 degrees to improve ability to perform kneeling and floor transfers   Pt will improve quad strength to 5/5 to ascend 1 flight of stairs reciprocally without UE assist   Pt will increase hip and knee strength to grossly 4+/5 to be able to get up and down from the floor safely   Pt will demonstrate increased hip ER/ABD strength to 4+/5 to perform stepping and squatting activities without excessive femoral IR/ADD   Pt will improve SLS to 20s to improve safety with gait on uneven surfaces such as grass and gravel  Pt will be independent and compliant with comprehensive HEP to maintain progress achieved in PT       Frequency / Duration: Patient will be seen for 1-2 x/week or a total of 10 visits over a 90 day period. Treatment will include: Gait training, Manual Therapy, Neuromuscular Re-education, Self-Care Home Management, Therapeutic Activities, Therapeutic Exercise, and Home Exercise Program instruction    Education or treatment limitation: None  Rehab Potential:excellent    LEFS Score  LEFS Score: 67.5 % (1/13/2024 10:39 AM)      Patient/Family/Caregiver was advised of these findings, precautions, and treatment options and has agreed to actively participate in planning and for this course of care.    Thank you for your referral. Please co-sign or sign and return this letter via fax as soon as possible to [phone]. If you have any questions, please contact me at Dept: [phone]    Sincerely,  Electronically signed by therapist: Sabina Soriano, PT  Physician's certification required: Yes  I certify the need for these services furnished under this plan of treatment and while under my care.    X___________________________________________________ Date____________________    Certification From: 1/17/2024  To:4/16/2024

## 2024-01-23 ENCOUNTER — APPOINTMENT (OUTPATIENT)
Dept: PHYSICAL THERAPY | Facility: HOSPITAL | Age: 64
End: 2024-01-23
Attending: ORTHOPAEDIC SURGERY
Payer: COMMERCIAL

## 2024-01-26 ENCOUNTER — OFFICE VISIT (OUTPATIENT)
Dept: PHYSICAL THERAPY | Facility: HOSPITAL | Age: 64
End: 2024-01-26
Attending: ORTHOPAEDIC SURGERY
Payer: COMMERCIAL

## 2024-01-26 PROCEDURE — 97110 THERAPEUTIC EXERCISES: CPT

## 2024-01-26 PROCEDURE — 97140 MANUAL THERAPY 1/> REGIONS: CPT

## 2024-01-26 NOTE — PROGRESS NOTES
Diagnosis:   Knee pain (M25.569)        Referring Provider: Esa  Date of Evaluation:    1/17/24    Precautions:  None Next MD visit:   none scheduled  Date of Surgery: n/a   Insurance Primary/Secondary: [employer] [place] P[place] / N/A     # Auth Visits: 30v policy            Subjective: Patient notices clicking in the knee today; this usually precipitates episode of swelling.   Current functional limitations include floor transfers, deep squatting, leg press.    Pain: 0/10      Objective:     From IE:         Assessment: Patient participates in open and close chain quad strengthening with muscle fatigue and no limitation of pain.       Goals:   (to be met in 10 visits)  Pt will improve knee extension ROM to 0 deg to allow proper heel strike during gait and terminal knee extension in stance   Pt will improve knee AROM flexion to >125 degrees to improve ability to perform kneeling and floor transfers   Pt will improve quad strength to 5/5 to ascend 1 flight of stairs reciprocally without UE assist   Pt will increase hip and knee strength to grossly 4+/5 to be able to get up and down from the floor safely   Pt will demonstrate increased hip ER/ABD strength to 4+/5 to perform stepping and squatting activities without excessive femoral IR/ADD   Pt will improve SLS to 20s to improve safety with gait on uneven surfaces such as grass and gravel  Pt will be independent and compliant with comprehensive HEP to maintain progress achieved in PT     Plan: Patient will be seen for 1-2 x/week or a total of 10 visits over a 90 day period. Treatment will include: Gait training, Manual Therapy, Neuromuscular Re-education, Self-Care Home Management, Therapeutic Activities, Therapeutic Exercise, and Home Exercise Program instruction   Date: 1/26/2024  TX#: 2/10 Date:                 TX#: 3/ Date:                 TX#: 4/ Date:                 TX#: 5/ Date:   Tx#: 6/   MT  -STW VMO/VL RLE       TE  -LAQ green t-band to fatigue x 3 sets  -DKTC SB  20x2  -SLP 42lb 10x3 B  -2:1 leg press 75lb 10x3 B                     HEP: LAQ    Charges: MT x 1; TE x 2       Total Timed Treatment: MT x 13min; TE x 30 min  Total Treatment Time: 43 min

## 2024-01-31 ENCOUNTER — OFFICE VISIT (OUTPATIENT)
Dept: PHYSICAL THERAPY | Facility: HOSPITAL | Age: 64
End: 2024-01-31
Attending: ORTHOPAEDIC SURGERY
Payer: COMMERCIAL

## 2024-01-31 ENCOUNTER — LAB ENCOUNTER (OUTPATIENT)
Dept: LAB | Facility: REFERENCE LAB | Age: 64
End: 2024-01-31
Attending: FAMILY MEDICINE
Payer: COMMERCIAL

## 2024-01-31 DIAGNOSIS — Z00.00 PHYSICAL EXAM: ICD-10-CM

## 2024-01-31 LAB
ALBUMIN SERPL-MCNC: 4.3 G/DL (ref 3.2–4.8)
ALBUMIN/GLOB SERPL: 1.3 {RATIO} (ref 1–2)
ALP LIVER SERPL-CCNC: 106 U/L
ALT SERPL-CCNC: 20 U/L
ANION GAP SERPL CALC-SCNC: 7 MMOL/L (ref 0–18)
AST SERPL-CCNC: 23 U/L (ref ?–34)
BASOPHILS # BLD AUTO: 0.06 X10(3) UL (ref 0–0.2)
BASOPHILS NFR BLD AUTO: 0.6 %
BILIRUB SERPL-MCNC: 0.4 MG/DL (ref 0.2–1.1)
BUN BLD-MCNC: 18 MG/DL (ref 9–23)
BUN/CREAT SERPL: 23.7 (ref 10–20)
CALCIUM BLD-MCNC: 9.5 MG/DL (ref 8.7–10.4)
CHLORIDE SERPL-SCNC: 109 MMOL/L (ref 98–112)
CHOLEST SERPL-MCNC: 182 MG/DL (ref ?–200)
CO2 SERPL-SCNC: 26 MMOL/L (ref 21–32)
CREAT BLD-MCNC: 0.76 MG/DL
DEPRECATED RDW RBC AUTO: 44.9 FL (ref 35.1–46.3)
EGFRCR SERPLBLD CKD-EPI 2021: 89 ML/MIN/1.73M2 (ref 60–?)
EOSINOPHIL # BLD AUTO: 0.13 X10(3) UL (ref 0–0.7)
EOSINOPHIL NFR BLD AUTO: 1.3 %
ERYTHROCYTE [DISTWIDTH] IN BLOOD BY AUTOMATED COUNT: 13.4 % (ref 11–15)
FASTING PATIENT LIPID ANSWER: NO
FASTING STATUS PATIENT QL REPORTED: NO
GLOBULIN PLAS-MCNC: 3.2 G/DL (ref 2.8–4.4)
GLUCOSE BLD-MCNC: 93 MG/DL (ref 70–99)
HCT VFR BLD AUTO: 43.3 %
HDLC SERPL-MCNC: 69 MG/DL (ref 40–59)
HGB BLD-MCNC: 14.2 G/DL
IMM GRANULOCYTES # BLD AUTO: 0.03 X10(3) UL (ref 0–1)
IMM GRANULOCYTES NFR BLD: 0.3 %
LDLC SERPL CALC-MCNC: 98 MG/DL (ref ?–100)
LYMPHOCYTES # BLD AUTO: 3.39 X10(3) UL (ref 1–4)
LYMPHOCYTES NFR BLD AUTO: 32.9 %
MCH RBC QN AUTO: 29.8 PG (ref 26–34)
MCHC RBC AUTO-ENTMCNC: 32.8 G/DL (ref 31–37)
MCV RBC AUTO: 91 FL
MONOCYTES # BLD AUTO: 0.81 X10(3) UL (ref 0.1–1)
MONOCYTES NFR BLD AUTO: 7.9 %
NEUTROPHILS # BLD AUTO: 5.89 X10 (3) UL (ref 1.5–7.7)
NEUTROPHILS # BLD AUTO: 5.89 X10(3) UL (ref 1.5–7.7)
NEUTROPHILS NFR BLD AUTO: 57 %
NONHDLC SERPL-MCNC: 113 MG/DL (ref ?–130)
OSMOLALITY SERPL CALC.SUM OF ELEC: 296 MOSM/KG (ref 275–295)
PLATELET # BLD AUTO: 309 10(3)UL (ref 150–450)
POTASSIUM SERPL-SCNC: 4.7 MMOL/L (ref 3.5–5.1)
PROT SERPL-MCNC: 7.5 G/DL (ref 5.7–8.2)
RBC # BLD AUTO: 4.76 X10(6)UL
SODIUM SERPL-SCNC: 142 MMOL/L (ref 136–145)
TRIGL SERPL-MCNC: 83 MG/DL (ref 30–149)
TSI SER-ACNC: 1.04 MIU/ML (ref 0.55–4.78)
VLDLC SERPL CALC-MCNC: 14 MG/DL (ref 0–30)
WBC # BLD AUTO: 10.3 X10(3) UL (ref 4–11)

## 2024-01-31 PROCEDURE — 84443 ASSAY THYROID STIM HORMONE: CPT

## 2024-01-31 PROCEDURE — 80053 COMPREHEN METABOLIC PANEL: CPT

## 2024-01-31 PROCEDURE — 97110 THERAPEUTIC EXERCISES: CPT

## 2024-01-31 PROCEDURE — 85025 COMPLETE CBC W/AUTO DIFF WBC: CPT

## 2024-01-31 PROCEDURE — 80061 LIPID PANEL: CPT

## 2024-01-31 PROCEDURE — 36415 COLL VENOUS BLD VENIPUNCTURE: CPT

## 2024-01-31 NOTE — PROGRESS NOTES
Diagnosis:   Knee pain (M25.569)        Referring Provider: Esa  Date of Evaluation:    1/17/24    Precautions:  None Next MD visit:   none scheduled  Date of Surgery: n/a   Insurance Primary/Secondary: [employer] [place] / N/A     # Auth Visits: 30v policy            Subjective: Patient states no swelling or pain in knee. Worked with  yesterday and did wall squats with ball- felt that this was challenging but went well.   Current functional limitations include floor transfers, deep squatting, leg press.    Pain: 0/10      Objective:     From IE:         Assessment: Patient participates in closed chain loading of LLE with focus on hip and knee strengthening. Noted quad weakness with challenge of greater degrees of closed chain knee flexion. Continue to progress for decreased limitation with floor transfers.       Goals:   (to be met in 10 visits)  Pt will improve knee extension ROM to 0 deg to allow proper heel strike during gait and terminal knee extension in stance   Pt will improve knee AROM flexion to >125 degrees to improve ability to perform kneeling and floor transfers   Pt will improve quad strength to 5/5 to ascend 1 flight of stairs reciprocally without UE assist   Pt will increase hip and knee strength to grossly 4+/5 to be able to get up and down from the floor safely   Pt will demonstrate increased hip ER/ABD strength to 4+/5 to perform stepping and squatting activities without excessive femoral IR/ADD   Pt will improve SLS to 20s to improve safety with gait on uneven surfaces such as grass and gravel  Pt will be independent and compliant with comprehensive HEP to maintain progress achieved in PT     Plan: Patient will be seen for 1-2 x/week or a total of 10 visits over a 90 day period. Treatment will include: Gait training, Manual Therapy, Neuromuscular Re-education, Self-Care Home Management, Therapeutic Activities, Therapeutic Exercise, and Home Exercise Program instruction   Date:  1/26/2024  TX#: 2/10 Date:1/31/2024                 TX#: 3/10 Date:                 TX#: 4/ Date:                 TX#: 5/ Date:   Tx#: 6/   MT  -STW VMO/VL RLE       TE  -LAQ green t-band to fatigue x 3 sets  -DKTC SB 20x2  -SLP 42lb 10x3 B  -2:1 leg press 75lb 10x3 B TE  -Hip abduction with slider to fatigue x 2 sets  -2:1 leg press 75lb 10x2 B  -Stationary bike L 6 x 4min  -Reverse Nordic to fatigue x 2 sets  -TRX assisted squats 10x2  -Anterior tibialis raises at wall to fatigue x 2 sets                    HEP: LAQ    Charges:  TE x 3      Total Timed Treatment: TE x 45 min  Total Treatment Time: 45 min

## 2024-02-02 ENCOUNTER — APPOINTMENT (OUTPATIENT)
Dept: PHYSICAL THERAPY | Facility: HOSPITAL | Age: 64
End: 2024-02-02
Attending: ORTHOPAEDIC SURGERY
Payer: COMMERCIAL

## 2024-02-04 NOTE — PROGRESS NOTES
HPI:   Parris Paredes is a 63 year old female who presents for a complete physical exam.    Last mammo:  scheduled   Last pap:  2020  cscope UTD  Exercise:  Yes, 2x a week with a        Hypothyroidism    Seeing Lyn Paredes for help with weight   Lost almost 50 lbs on wegovy and feels great!    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     Cholesterol, Total (mg/dL)   Date Value   2024 182   2022 225 (H)   2021 204 (H)     HDL Cholesterol (mg/dL)   Date Value   2024 69 (H)   2022 87 (H)   2021 81 (H)     LDL Cholesterol (mg/dL)   Date Value   2024 98   2022 120 (H)   2021 110 (H)        Current Outpatient Medications   Medication Sig Dispense Refill    ALPRAZolam (XANAX) 0.25 MG Oral Tab Take 1 tablet (0.25 mg total) by mouth 2 (two) times daily as needed for Anxiety. 20 tablet 0    levothyroxine 88 MCG Oral Tab Take 1 tablet (88 mcg total) by mouth before breakfast. 90 tablet 3    semaglutide-weight management (WEGOVY) 2.4 MG/0.75ML Subcutaneous Solution Auto-injector Inject 0.75 mL (2.4 mg total) into the skin once a week. 9 mL 1    Meloxicam 15 MG Oral Tab Take 1 tablet (15 mg total) by mouth daily as needed for Pain. 30 tablet 0      Past Medical History:   Diagnosis Date    Anxiety     Thyroid disease       Past Surgical History:   Procedure Laterality Date    KNEE SURGERY Bilateral     BREANNE BIOPSY STEREO NODULE 1 SITE RIGHT (CPT=19081)        Family History   Problem Relation Age of Onset    Hypertension Father     Heart Disease Father     Other (afib) Father          95      Social History:   Social History     Socioeconomic  History    Marital status:    Tobacco Use    Smoking status: Never    Smokeless tobacco: Never   Vaping Use    Vaping Use: Never used   Substance and Sexual Activity    Alcohol use: Yes     Alcohol/week: 0.0 standard drinks of alcohol     Comment: Socially     Drug use: No   Other Topics Concern    Caffeine Concern Yes     Comment: Coffee, soda - 4 cups per day           REVIEW OF SYSTEMS:   GENERAL: feels well otherwise  SKIN: denies any unusual skin lesions  LUNGS: denies shortness of breath  CARDIOVASCULAR: denies chest pain  GI: denies abdominal pain,  No constipation or diarrhea  PSYCHE: denies depression or anxiety    See HPI    EXAM:   /70   Pulse 66   Ht 5' 8.3\" (1.735 m)   Wt 207 lb 9.6 oz (94.2 kg)   SpO2 97%   BMI 31.29 kg/m²   Body mass index is 31.29 kg/m².   GENERAL: well developed, well nourished,in no apparent distress  SKIN: no rashes,no suspicious lesions  HEENT: atraumatic, normocephali  EYES,conjunctiva are clear  Ears normal TMs and canals   Mild serous otitis L ear- no erythema and canal normal  N  NECK: supple,no adenopathy  BREAST: no dominant or suspicious mass, no nipple discharge  LUNGS: clear to auscultation  CARDIO: RRR without murmur  GI: good BS's,no masses, HSM or tenderness    : normal external genitalia, normal appearing cervix, no adnexal masses.   EXTREMITIES: no edema,    The 10-year ASCVD risk score (Sondra COVARRUBIAS, et al., 2019) is: 2%    Values used to calculate the score:      Age: 63 years      Sex: Female      Is Non- : No      Diabetic: No      Tobacco smoker: No      Systolic Blood Pressure: 104 mmHg      Is BP treated: No      HDL Cholesterol: 69 mg/dL      Total Cholesterol: 182 mg/dL    ASSESSMENT AND PLAN:   Parris Paredes is a 63 year old female who presents for a complete physical exam.  Encounter Diagnoses   Name Primary?    Physical exam Yes    Hypothyroidism, unspecified type     Anxiety      Discussed with patient pap  smear guidelines and the importance of screening for cervical cancer  Discussed the importance of yearly mammograms starting at age 40 to help detect breast cancer.   Self breast exam explained and encouraged to perform monthly.   Health maintenance labs, recommend yearly: Lipids, CMP, and CBC.   Discussed importance of screening for colon cancer with colonoscopies starting at age 45, or sooner if high risk  Recommended low fat diet, low carb diet and regular aerobic exercise.    The patient indicates understanding of these issues and agrees to the plan.  The patient is asked to return for CPX in 1 yr.    1. Physical exam  Reviewed labs  Recommend shingles vaccine, will get another day   - FLULAVAL INFLUENZA VACCINE QUAD PRESERVATIVE FREE 0.5 ML  - ThinPrep PAP Smear [E]; Future  - Hpv High Risk , Thin Prep Collect; Future    2. Hypothyroidism, unspecified type  Lab WNL  CPM   - levothyroxine 88 MCG Oral Tab; Take 1 tablet (88 mcg total) by mouth before breakfast.  Dispense: 90 tablet; Refill: 3    3. Anxiety  Xanax for flying  Pt given prescription for benzodiazepines. Explained that this is for prn use, such as a panic attack and these medications can be  addictive and tolerance can develop. If patient is needing this medication too much, a daily medication or change in their medication will be needed to help control their anxiety. Pt agrees to this.     - ALPRAZolam (XANAX) 0.25 MG Oral Tab; Take 1 tablet (0.25 mg total) by mouth 2 (two) times daily as needed for Anxiety.  Dispense: 20 tablet; Refill: 0        Orders Placed This Encounter   Procedures    Hpv High Risk , Thin Prep Collect    FLULAVAL INFLUENZA VACCINE QUAD PRESERVATIVE FREE 0.5 ML    ThinPrep PAP Smear [E]       Meds & Refills for this Visit:  Requested Prescriptions     Signed Prescriptions Disp Refills    ALPRAZolam (XANAX) 0.25 MG Oral Tab 20 tablet 0     Sig: Take 1 tablet (0.25 mg total) by mouth 2 (two) times daily as needed for Anxiety.     levothyroxine 88 MCG Oral Tab 90 tablet 3     Sig: Take 1 tablet (88 mcg total) by mouth before breakfast.       Imaging & Consults:  FLULAVAL INFLUENZA VACCINE QUAD PRESERVATIVE FREE 0.5 ML

## 2024-02-05 ENCOUNTER — OFFICE VISIT (OUTPATIENT)
Dept: INTERNAL MEDICINE CLINIC | Facility: CLINIC | Age: 64
End: 2024-02-05
Payer: COMMERCIAL

## 2024-02-05 VITALS
OXYGEN SATURATION: 97 % | BODY MASS INDEX: 31.47 KG/M2 | WEIGHT: 207.63 LBS | HEART RATE: 66 BPM | SYSTOLIC BLOOD PRESSURE: 104 MMHG | HEIGHT: 68.3 IN | DIASTOLIC BLOOD PRESSURE: 70 MMHG

## 2024-02-05 DIAGNOSIS — E66.9 OBESITY (BMI 30-39.9): ICD-10-CM

## 2024-02-05 DIAGNOSIS — Z00.00 PHYSICAL EXAM: Primary | ICD-10-CM

## 2024-02-05 DIAGNOSIS — E03.9 HYPOTHYROIDISM, UNSPECIFIED TYPE: ICD-10-CM

## 2024-02-05 DIAGNOSIS — F41.9 ANXIETY: ICD-10-CM

## 2024-02-05 PROCEDURE — 90686 IIV4 VACC NO PRSV 0.5 ML IM: CPT | Performed by: FAMILY MEDICINE

## 2024-02-05 PROCEDURE — 3008F BODY MASS INDEX DOCD: CPT | Performed by: FAMILY MEDICINE

## 2024-02-05 PROCEDURE — 87625 HPV TYPES 16 & 18 ONLY: CPT | Performed by: FAMILY MEDICINE

## 2024-02-05 PROCEDURE — 87624 HPV HI-RISK TYP POOLED RSLT: CPT | Performed by: FAMILY MEDICINE

## 2024-02-05 PROCEDURE — 90471 IMMUNIZATION ADMIN: CPT | Performed by: FAMILY MEDICINE

## 2024-02-05 PROCEDURE — 99396 PREV VISIT EST AGE 40-64: CPT | Performed by: FAMILY MEDICINE

## 2024-02-05 PROCEDURE — 3078F DIAST BP <80 MM HG: CPT | Performed by: FAMILY MEDICINE

## 2024-02-05 PROCEDURE — 88175 CYTOPATH C/V AUTO FLUID REDO: CPT | Performed by: FAMILY MEDICINE

## 2024-02-05 PROCEDURE — 3074F SYST BP LT 130 MM HG: CPT | Performed by: FAMILY MEDICINE

## 2024-02-05 RX ORDER — ALPRAZOLAM 0.25 MG/1
0.25 TABLET ORAL 2 TIMES DAILY PRN
Qty: 20 TABLET | Refills: 0 | Status: SHIPPED | OUTPATIENT
Start: 2024-02-05

## 2024-02-05 RX ORDER — LEVOTHYROXINE SODIUM 88 UG/1
88 TABLET ORAL
Qty: 90 TABLET | Refills: 3 | Status: SHIPPED | OUTPATIENT
Start: 2024-02-05

## 2024-02-06 LAB — HPV I/H RISK 1 DNA SPEC QL NAA+PROBE: POSITIVE

## 2024-02-07 ENCOUNTER — APPOINTMENT (OUTPATIENT)
Dept: PHYSICAL THERAPY | Facility: HOSPITAL | Age: 64
End: 2024-02-07
Attending: ORTHOPAEDIC SURGERY
Payer: COMMERCIAL

## 2024-02-07 ENCOUNTER — APPOINTMENT (OUTPATIENT)
Dept: PHYSICAL THERAPY | Facility: HOSPITAL | Age: 64
End: 2024-02-07
Attending: FAMILY MEDICINE
Payer: COMMERCIAL

## 2024-02-07 RX ORDER — SEMAGLUTIDE 2.4 MG/.75ML
2.4 INJECTION, SOLUTION SUBCUTANEOUS WEEKLY
Qty: 9 ML | Refills: 1 | Status: SHIPPED | OUTPATIENT
Start: 2024-02-07

## 2024-02-08 LAB
HPV16 DNA CVX QL PROBE+SIG AMP: NEGATIVE
HPV18 DNA CVX QL PROBE+SIG AMP: NEGATIVE

## 2024-02-13 ENCOUNTER — TELEPHONE (OUTPATIENT)
Dept: SURGERY | Facility: CLINIC | Age: 64
End: 2024-02-13

## 2024-02-13 ENCOUNTER — OFFICE VISIT (OUTPATIENT)
Dept: OTOLARYNGOLOGY | Facility: CLINIC | Age: 64
End: 2024-02-13
Payer: COMMERCIAL

## 2024-02-13 ENCOUNTER — OFFICE VISIT (OUTPATIENT)
Dept: PHYSICAL THERAPY | Facility: HOSPITAL | Age: 64
End: 2024-02-13
Attending: ORTHOPAEDIC SURGERY
Payer: COMMERCIAL

## 2024-02-13 VITALS — WEIGHT: 207 LBS | BODY MASS INDEX: 31.37 KG/M2 | HEIGHT: 68.3 IN

## 2024-02-13 DIAGNOSIS — H69.90 EUSTACHIAN TUBE DISORDER, UNSPECIFIED LATERALITY: Primary | ICD-10-CM

## 2024-02-13 PROCEDURE — 69420 INCISION OF EARDRUM: CPT | Performed by: STUDENT IN AN ORGANIZED HEALTH CARE EDUCATION/TRAINING PROGRAM

## 2024-02-13 PROCEDURE — 99203 OFFICE O/P NEW LOW 30 MIN: CPT | Performed by: STUDENT IN AN ORGANIZED HEALTH CARE EDUCATION/TRAINING PROGRAM

## 2024-02-13 PROCEDURE — 3008F BODY MASS INDEX DOCD: CPT | Performed by: STUDENT IN AN ORGANIZED HEALTH CARE EDUCATION/TRAINING PROGRAM

## 2024-02-13 PROCEDURE — 92567 TYMPANOMETRY: CPT | Performed by: STUDENT IN AN ORGANIZED HEALTH CARE EDUCATION/TRAINING PROGRAM

## 2024-02-13 PROCEDURE — 97110 THERAPEUTIC EXERCISES: CPT

## 2024-02-13 NOTE — PROGRESS NOTES
Diagnosis:   Knee pain (M25.569)        Referring Provider: Esa  Date of Evaluation:    1/17/24    Precautions:  None Next MD visit:   none scheduled  Date of Surgery: n/a   Insurance Primary/Secondary: [employer] P[place] / N/A     # Auth Visits: 30v policy            Subjective: Patient states no swelling or pain in knee. Worked with  yesterday and did wall squats with ball- felt that this was challenging but went well.   Current functional limitations include floor transfers, deep squatting, leg press.    Pain: 0/10      Objective:     MMT  Knee extension: 4/5 RLE; 4+/5 LLE         Assessment: Patient continues to be limited with eccentric and concentric quad strength from greater degree of knee flexion to perform toilet transfer and descend flight of stairs. Continue to progress towards long term goals.       Goals:   (to be met in 10 visits)  Pt will improve knee extension ROM to 0 deg to allow proper heel strike during gait and terminal knee extension in stance   Pt will improve knee AROM flexion to >125 degrees to improve ability to perform kneeling and floor transfers   Pt will improve quad strength to 5/5 to ascend 1 flight of stairs reciprocally without UE assist   Pt will increase hip and knee strength to grossly 4+/5 to be able to get up and down from the floor safely   Pt will demonstrate increased hip ER/ABD strength to 4+/5 to perform stepping and squatting activities without excessive femoral IR/ADD   Pt will improve SLS to 20s to improve safety with gait on uneven surfaces such as grass and gravel  Pt will be independent and compliant with comprehensive HEP to maintain progress achieved in PT     Plan: Patient will be seen for 1-2 x/week or a total of 10 visits over a 90 day period. Treatment will include: Gait training, Manual Therapy, Neuromuscular Re-education, Self-Care Home Management, Therapeutic Activities, Therapeutic Exercise, and Home Exercise Program instruction   Date:  1/26/2024  TX#: 2/10 Date:1/31/2024                 TX#: 3/10 Date: 2/13/2024                TX#: 4/10 Date:                 TX#: 5/ Date:   Tx#: 6/   MT  -STW VMO/VL RLE       TE  -LAQ green t-band to fatigue x 3 sets  -DKTC SB 20x2  -SLP 42lb 10x3 B  -2:1 leg press 75lb 10x3 B TE  -Hip abduction with slider to fatigue x 2 sets  -2:1 leg press 75lb 10x2 B  -Stationary bike L 6 x 4min  -Reverse Nordic to fatigue x 2 sets  -TRX assisted squats 10x2  -Anterior tibialis raises at wall to fatigue x 2 sets TE  -DKTC swissball x15  -HS curl swissball 6x2  -Hip hike on step to fatigue x 2 sets  -Forward lunge on 8in step 10x3 B  -4in forward step down 10x2 B  -TRX assisted squat tap to 16in surface                     HEP: LAQ    Charges:  TE x 3      Total Timed Treatment: TE x 45 min  Total Treatment Time: 45 min

## 2024-02-14 ENCOUNTER — OFFICE VISIT (OUTPATIENT)
Dept: PHYSICAL THERAPY | Facility: HOSPITAL | Age: 64
End: 2024-02-14
Attending: ORTHOPAEDIC SURGERY
Payer: COMMERCIAL

## 2024-02-14 PROCEDURE — 97112 NEUROMUSCULAR REEDUCATION: CPT

## 2024-02-14 PROCEDURE — 97110 THERAPEUTIC EXERCISES: CPT

## 2024-02-14 PROCEDURE — 97140 MANUAL THERAPY 1/> REGIONS: CPT

## 2024-02-14 NOTE — PROGRESS NOTES
Parris Paredes is a 63 year old female.   Chief Complaint   Patient presents with    Ear Problem     Fluid in left ear x3 weeks         ASSESSMENT AND PLAN:   1. Eustachian tube disorder, unspecified laterality  63-year-old presents with left ear congestion and feeling it is clogged for the last 2-1/2 weeks.  She was treated after an upper respiratory infection.  She had a similar occurrence years ago where she states fluid was suctioned out of her ear.    Exam her left tympanic membrane is opaque with a flat tympanogram.    Myringotomy performed without any fluid present.    Myringotomy performed in the setting of eustachian tube dysfunction and a flat tympanogram prior to an upcoming flight on Thursday.  She is now okay to fly and we discussed flying precautions.  Discussed that in general this will take some time to heal and normalize up to another 4 to 6 weeks.  Discussed in general the treatment would consist of Sudafed, Flonase and oral steroids although oral avoid the steroids and Sudafed due to possible side effects.  - Audiology Exam      The patient indicates understanding of these issues and agrees to the plan.      EXAM:   Ht 5' 8.3\" (1.735 m)   Wt 207 lb (93.9 kg)   BMI 31.20 kg/m²     Pertinent exam findings may also be noted above in assessment and plan     System Details   Skin Inspection - Normal.   Constitutional Overall appearance - Normal.   Head/Face Symmetric, TMJ tenderness not present    Eyes EOMI, PERRL   Right ear:  Canal clear, TM intact, no LELAND   Left ear:  Canal clear, TM intact, no LELAND   Nose: Septum midline, inferior turbinates not enlarged, nasal valves without collapse    Oral cavity/Oropharynx: No lesions or masses on inspection or palpation, tonsils symmetric    Neck: Soft without LAD, thyroid not enlarged  Voice clear/ no stridor   Other:      Scopes and Procedures:       Procedures:  Myringotomy/Tympanostomy:  Pre-Procedure Care: Consent was obtained. Procedure/risks were  explained. Questions were answered. Correct patient identified. Correct side and site confirmed.   A myringotomy was performed in the ann-inferior quadrant of the left tympanic membrane.  Anesthetic used was Phenol placed topically. Patient tolerated procedure well.      Current Outpatient Medications   Medication Sig Dispense Refill    semaglutide-weight management (WEGOVY) 2.4 MG/0.75ML Subcutaneous Solution Auto-injector Inject 0.75 mL (2.4 mg total) into the skin once a week. 9 mL 1    ALPRAZolam (XANAX) 0.25 MG Oral Tab Take 1 tablet (0.25 mg total) by mouth 2 (two) times daily as needed for Anxiety. 20 tablet 0    levothyroxine 88 MCG Oral Tab Take 1 tablet (88 mcg total) by mouth before breakfast. 90 tablet 3    Meloxicam 15 MG Oral Tab Take 1 tablet (15 mg total) by mouth daily as needed for Pain. 30 tablet 0      Past Medical History:   Diagnosis Date    Anxiety     Thyroid disease       Social History:  Social History     Socioeconomic History    Marital status:    Tobacco Use    Smoking status: Never    Smokeless tobacco: Never   Vaping Use    Vaping Use: Never used   Substance and Sexual Activity    Alcohol use: Yes     Alcohol/week: 0.0 standard drinks of alcohol     Comment: Socially     Drug use: No   Other Topics Concern    Caffeine Concern Yes     Comment: Coffee, soda - 4 cups per day           Federico Murphy MD  2/14/2024  8:19 AM

## 2024-02-14 NOTE — PROGRESS NOTES
Diagnosis:   Knee pain (M25.569)        Referring Provider: Esa  Date of Evaluation:    1/17/24    Precautions:  None Next MD visit:   none scheduled  Date of Surgery: n/a   Insurance Primary/Secondary: [place] P[place] / N/A     # Auth Visits: 30v policy            Subjective: Patient states she had muscle soreness yesterday.   Current functional limitations include floor transfers, deep squatting, leg press.    Pain: 0/10      Objective:     MMT  Knee extension: 4/5 RLE; 4+/5 LLE         Assessment: Be-Activated technique performed for facilitation of core musculature; noted improvements with efficiency of sit/stand transfer and pain reports along anterior knees. Functional trendelenburg noted with lateral step down, able to correct with cueing on the left, difficulty correcting on the right.       Goals:   (to be met in 10 visits)  Pt will improve knee extension ROM to 0 deg to allow proper heel strike during gait and terminal knee extension in stance   Pt will improve knee AROM flexion to >125 degrees to improve ability to perform kneeling and floor transfers   Pt will improve quad strength to 5/5 to ascend 1 flight of stairs reciprocally without UE assist   Pt will increase hip and knee strength to grossly 4+/5 to be able to get up and down from the floor safely   Pt will demonstrate increased hip ER/ABD strength to 4+/5 to perform stepping and squatting activities without excessive femoral IR/ADD   Pt will improve SLS to 20s to improve safety with gait on uneven surfaces such as grass and gravel  Pt will be independent and compliant with comprehensive HEP to maintain progress achieved in PT     Plan: Patient will be seen for 1-2 x/week or a total of 10 visits over a 90 day period. Treatment will include: Gait training, Manual Therapy, Neuromuscular Re-education, Self-Care Home Management, Therapeutic Activities, Therapeutic Exercise, and Home Exercise Program instruction   Date: 1/26/2024  TX#: 2/10  Date:1/31/2024                 TX#: 3/10 Date: 2/13/2024                TX#: 4/10 Date: 2/14/2024                TX#: 5/10 Date:   Tx#: 6/   MT  -STW VMO/VL RLE   MT  -Be Activated technique: psoas, glute quad,     TE  -LAQ green t-band to fatigue x 3 sets  -DKTC SB 20x2  -SLP 42lb 10x3 B  -2:1 leg press 75lb 10x3 B TE  -Hip abduction with slider to fatigue x 2 sets  -2:1 leg press 75lb 10x2 B  -Stationary bike L 6 x 4min  -Reverse Nordic to fatigue x 2 sets  -TRX assisted squats 10x2  -Anterior tibialis raises at wall to fatigue x 2 sets TE  -DKTC swissball x15  -HS curl swissball 6x2  -Hip hike on step to fatigue x 2 sets  -Forward lunge on 8in step 10x3 B  -4in forward step down 10x2 B  -TRX assisted squat tap to 16in surface   TE  -DKTC swissball x15  -4in eccentric lateral step down 10x2 B         NM Re-ed  -RDL 6x3 B  -Anterior tib raise at wall to fatigue x 2 sets           HEP: LAQ    Charges:  TE x 1; MT x 1; NM x 1      Total Timed Treatment: TE x 12min; MT x 16min; NM x 12min  Total Treatment Time: 40min

## 2024-02-20 ENCOUNTER — PATIENT MESSAGE (OUTPATIENT)
Dept: OTOLARYNGOLOGY | Facility: CLINIC | Age: 64
End: 2024-02-20

## 2024-03-04 ENCOUNTER — APPOINTMENT (OUTPATIENT)
Dept: PHYSICAL THERAPY | Facility: HOSPITAL | Age: 64
End: 2024-03-04
Attending: ORTHOPAEDIC SURGERY
Payer: COMMERCIAL

## 2024-03-07 ENCOUNTER — APPOINTMENT (OUTPATIENT)
Dept: PHYSICAL THERAPY | Facility: HOSPITAL | Age: 64
End: 2024-03-07
Attending: ORTHOPAEDIC SURGERY
Payer: COMMERCIAL

## 2024-03-07 ENCOUNTER — OFFICE VISIT (OUTPATIENT)
Dept: SURGERY | Facility: CLINIC | Age: 64
End: 2024-03-07
Payer: COMMERCIAL

## 2024-03-07 ENCOUNTER — TELEPHONE (OUTPATIENT)
Dept: PHYSICAL THERAPY | Facility: HOSPITAL | Age: 64
End: 2024-03-07

## 2024-03-07 VITALS
OXYGEN SATURATION: 96 % | HEIGHT: 68.3 IN | WEIGHT: 205 LBS | BODY MASS INDEX: 31.07 KG/M2 | HEART RATE: 71 BPM | DIASTOLIC BLOOD PRESSURE: 70 MMHG | SYSTOLIC BLOOD PRESSURE: 102 MMHG

## 2024-03-07 DIAGNOSIS — F41.9 ANXIETY: ICD-10-CM

## 2024-03-07 DIAGNOSIS — E66.9 OBESITY (BMI 30-39.9): ICD-10-CM

## 2024-03-07 DIAGNOSIS — Z51.81 ENCOUNTER FOR THERAPEUTIC DRUG MONITORING: ICD-10-CM

## 2024-03-07 DIAGNOSIS — E78.5 DYSLIPIDEMIA: Primary | ICD-10-CM

## 2024-03-07 DIAGNOSIS — E03.9 HYPOTHYROIDISM, UNSPECIFIED TYPE: ICD-10-CM

## 2024-03-07 PROCEDURE — 3074F SYST BP LT 130 MM HG: CPT | Performed by: NURSE PRACTITIONER

## 2024-03-07 PROCEDURE — 99214 OFFICE O/P EST MOD 30 MIN: CPT | Performed by: NURSE PRACTITIONER

## 2024-03-07 PROCEDURE — 3078F DIAST BP <80 MM HG: CPT | Performed by: NURSE PRACTITIONER

## 2024-03-07 PROCEDURE — 3008F BODY MASS INDEX DOCD: CPT | Performed by: NURSE PRACTITIONER

## 2024-03-07 NOTE — PROGRESS NOTES
[facility], [facility], [place]  [address]  [facility] [address]  Dept: [phone]       Patient:  Parris Paredes  :      1960  MRN:      [id number]    Chief Complaint:    Chief Complaint   Patient presents with    Follow - Up    Weight Management    Obesity       SUBJECTIVE     History of Present Illness:  Parris is being seen today for a follow-up for medically supported weight loss.      Doing well on Wegovy.     Initial HPI:  61 yo female.   Presents to clinic for assistance with weight loss/management.    Patient is considering medications and is not currently a candidate for bariatric surgery for weight loss.    Patient denies any history of eating disorder(s).    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  Patient lives with daughter (25 year old). 3 children total.    Patient's goal weight: 170-180 lbs  Biggest weight loss in the past:    How weight loss was achieved: WW  Heaviest weight ever: Current   Previous use of medical weight loss medications: Stimulant a few months- no significant weight change    Physical activity:  2 days/week; weight training; walks 1 day/week     Sleep: 7-8 hours/night    Sleep screening: planning sleep study       Past Medical History:   Past Medical History:   Diagnosis Date    Anxiety     Thyroid disease         Comorbidities:  Hyperlipidemia-Improvement?  yes    OBJECTIVE     Vitals: /70 (BP Location: Right arm, Patient Position: Sitting, Cuff Size: adult)   Pulse 71   Ht 5' 8.3\" (1.735 m)   Wt 205 lb (93 kg)   SpO2 96%   BMI 30.90 kg/m²     Initial weight loss: -10   Total weight loss: -49   Start weight: 254   Weight loss % to date: 19.29%    Wt Readings from Last 6 Encounters:   24 205 lb (93 kg)   24 207 lb (93.9 kg)   24 207 lb 9.6 oz (94.2 kg)   11/10/23 215 lb (97.5 kg)   23 235 lb 12.8 oz (107 kg)   23 238 lb (108 kg)        Patient Medications:    Current Outpatient Medications   Medication Sig Dispense Refill    semaglutide-weight management (WEGOVY) 2.4 MG/0.75ML Subcutaneous Solution Auto-injector Inject 0.75 mL (2.4 mg total) into the skin once a week. 9 mL 1    ALPRAZolam (XANAX) 0.25 MG Oral Tab Take 1 tablet (0.25 mg total) by mouth 2 (two) times daily as needed for Anxiety. 20 tablet 0    levothyroxine 88 MCG Oral Tab Take 1 tablet (88 mcg total) by mouth before breakfast. 90 tablet 3    Meloxicam 15 MG Oral Tab Take 1 tablet (15 mg total) by mouth daily as needed for Pain. 30 tablet 0     Allergies:  Patient has no known allergies.     Social History:  Reviewed    Surgical History:    Past Surgical History:   Procedure Laterality Date    KNEE SURGERY Bilateral     BREANNE BIOPSY STEREO NODULE 1 SITE RIGHT (CPT=19081)       Family History:    Family History   Problem Relation Age of Onset    Hypertension Father     Heart Disease Father     Other (afib) Father          95     Initial Intake:  Likes eating, likes going out to eat   After dinner behavior: + ishan crackers, 100 calorie bar  Night eating: -  Portion sizes: occasional    Binge: -  Emotional: -  Depression: Score: 1  Grazing: -  Sweet tooth: +  Crunchy/salty: -  Etoh: 3-6 drinks/week; socially  Good water intake, 3 cups coffee/day   Soda Drinker: Yes                 If yes, how much?:  Diet coke every other day [employer]   Sports Drinks:  No                  Juice:  No                     Number of restaurant or fast food meals/week: 2 meals/week  Very limited FF      Food Journal  Reviewed and Discussed:       Patient has a Food Journal?: yes MFP  Patient is reading nutrition labels?  yes  Average Caloric Intake: 9005-9835/day     Average CHO Intake:   Protein goal: 80-90 g/day  Is patient exercising? yes  Type of exercise? Trainer 2 days/week; weights  Walks 1-2 days/week     Eating Habits  Patient states the following:  Eats 3 meal(s) per day  Length  of time it takes to consume a meal:    # of snacks per day:    Type of snacks:    Amount of soda consumption per day:  Decreased significantly   Amount of water (in ounces) per day:   Toughest challenge:     B: oatmeal; eggs; kodiak waffle  L: protein shake with produce  D: protein, starch, vegetables    Met with NEYMAR Wilcox in the past     Nutritional Goals  Eat 3-4 cups of fresh fruits or vegetables daily    Behavior Modifications Reviewed and Discussed  Eat breakfast, Eat 3 meals per day, Plan meals in advance, Read nutrition labels, Drink 64 oz of water per day, Maintain a daily food journal, Utlize portion control strategies to reduce calorie intake, Identify triggers for eating and manage cues and Eat slowly and take 20 to 30 minutes to complete each meal    Exercise Goals Reviewed and Discussed:    Increase walking   Aim for 150 minutes moderate level exercise weekly with 2-3 days strength training as tolerated     ROS:    Constitutional: negative  Respiratory: negative  Cardiovascular: positive for lower extremity edema and occasionally   Gastrointestinal: positive for reflux symptoms and with wine intake   Integument/breast: negative  Hematologic/lymphatic: negative  Musculoskeletal:positive for arthralgias and knees   Neurological: positive for headaches  Behavioral/Psych: negative  Endocrine: negative  All other systems were reviewed and are negative    Physical Exam:  General appearance: alert, appears stated age, cooperative and obese  Head: Normocephalic, without obvious abnormality, atraumatic  Neck: no adenopathy, no carotid bruit, no JVD, supple, symmetrical, trachea midline and thyroid not enlarged, symmetric, no tenderness/mass/nodules  Lungs: clear to auscultation bilaterally  Heart: S1, S2 normal, no murmur, click, rub or gallop, regular rate and rhythm  Abdomen: soft, non-tender; bowel sounds normal; no masses,  no organomegaly and abdomen obese   Extremities: intact, no edema   Pulses: 2+ and  symmetric  Skin: intact   Neurologic: Grossly normal      ASSESSMENT     Encounter Diagnosis(ses):   Encounter Diagnoses   Name Primary?    Dyslipidemia Yes    Hypothyroidism, unspecified type     Obesity (BMI 30-39.9)     Anxiety     Encounter for therapeutic drug monitoring        PLAN       Diagnoses and all orders for this visit:    Dyslipidemia    Hypothyroidism, unspecified type    Obesity (BMI 30-39.9)    Anxiety    Encounter for therapeutic drug monitoring      DYSLIPIDEMIA: Recommend dietary changes and lifestyle modifications as discussed below. Monitor.       Lab Results   Component Value Date/Time    CHOLEST 182 01/31/2024 12:27 PM    LDL 98 01/31/2024 12:27 PM    HDL 69 (H) 01/31/2024 12:27 PM    TRIG 83 01/31/2024 12:27 PM    VLDL 14 01/31/2024 12:27 PM     OBESITY/WEIGHT GAIN:     Recommended patient continue intensive lifestyle and behavioral modifications at this time for weight loss.     Reviewed lifestyle modifications: Whole Food/Plant Strong/Low Glycemic Index diet, moderate alcohol consumption, reduced sodium intake to no more than 2,400 mg/day, and at least 150 minutes of moderate physical activity per week.   Avoid processed, poor quality carbohydrates, refined grains, flour, sugar.     Goals for next month:  1. Keep a food log.   2. Drink 64 ounces of non-caloric beverages per day. No fruit juices or regular soda.  3. Aim for 150 minutes moderate exercise per week.    4. Increase fruit and vegetable servings to 5-6 per day.    5. Improve sleep and stress.      Reviewed other labs:  12/1/22- Alk phosphatase mildly elevated- monitor.   CMP otherwise in range. Thyroid studies in range.   Elevated WBCs- monitor.  1/31/24- TSH, CMP, CBC in range. Non-fasting.      Discussed medication options for weight loss in detail with patient.      Denies personal or family hx medullary thyroid CA, endocrine neoplasia syndrome, pancreatitis hx, suicidal ideation. No renal impairment, severe GI disease,  diabetes, pancreatitis risks noted.     Continue Wegovy 2.4 mg weekly.   Weight loss % to date: 19.29%.    SQ administration teaching provided to patient.   Discussed risks, benefits, and side effects of medication.     Met with integrative RD. Follow up as recommended.     Senna tea or psyllium husk daily for constipation.     Increase walking.     Goal: 170-180 lbs.      RTC 4 months.     HEAVENLY Bose

## 2024-03-11 ENCOUNTER — OFFICE VISIT (OUTPATIENT)
Dept: PHYSICAL THERAPY | Facility: HOSPITAL | Age: 64
End: 2024-03-11
Attending: ORTHOPAEDIC SURGERY
Payer: COMMERCIAL

## 2024-03-11 ENCOUNTER — TELEPHONE (OUTPATIENT)
Dept: PHYSICAL THERAPY | Facility: HOSPITAL | Age: 64
End: 2024-03-11

## 2024-03-11 PROCEDURE — 97140 MANUAL THERAPY 1/> REGIONS: CPT

## 2024-03-11 PROCEDURE — 97110 THERAPEUTIC EXERCISES: CPT

## 2024-03-11 NOTE — PROGRESS NOTES
Diagnosis:   Knee pain (M25.569)        Referring Provider: Esa  Date of Evaluation:    1/17/24    Precautions:  None Next MD visit:   none scheduled  Date of Surgery: n/a   Insurance Primary/Secondary: [employer] [place] P[place] / N/A     # Auth Visits: 30v policy            Subjective: Patient states that her knees are sore today, unsure why. Patient was traveling over the last 3 weeks and was not as consistent with her workouts. Got back last week and worked out twice--was feeling muscle soreness from this.  Current functional limitations include floor transfers, deep squatting, leg press.    Pain: 4/10      Objective:     MMT  Knee extension: 4/5 RLE; 4+/5 LLE         Assessment: Be-Activated technique performed for facilitation of psoas and gluteal musculature. Reports of decrease in bilateral anterior knee pain from 4/10 to 0/10. Mild increase in symptoms with eccentric loading into single leg press. This was modified with altering force loading pattern from forefoot to rearfoot.       Goals:   (to be met in 10 visits)  Pt will improve knee extension ROM to 0 deg to allow proper heel strike during gait and terminal knee extension in stance   Pt will improve knee AROM flexion to >125 degrees to improve ability to perform kneeling and floor transfers   Pt will improve quad strength to 5/5 to ascend 1 flight of stairs reciprocally without UE assist   Pt will increase hip and knee strength to grossly 4+/5 to be able to get up and down from the floor safely   Pt will demonstrate increased hip ER/ABD strength to 4+/5 to perform stepping and squatting activities without excessive femoral IR/ADD   Pt will improve SLS to 20s to improve safety with gait on uneven surfaces such as grass and gravel  Pt will be independent and compliant with comprehensive HEP to maintain progress achieved in PT     Plan: Patient will be seen for 1-2 x/week or a total of 10 visits over a 90 day period. Treatment will include: Gait training, Manual  Therapy, Neuromuscular Re-education, Self-Care Home Management, Therapeutic Activities, Therapeutic Exercise, and Home Exercise Program instruction   Date: 1/26/2024  TX#: 2/10 Date:1/31/2024                 TX#: 3/10 Date: 2/13/2024                TX#: 4/10 Date: 2/14/2024                TX#: 5/10 Date:   Tx#: 6/10   MT  -STW VMO/VL RLE   MT  -Be Activated technique: psoas, glute quad,  MT  -Be Activated technique: psoas, glute quad,    TE  -LAQ green t-band to fatigue x 3 sets  -DKTC SB 20x2  -SLP 42lb 10x3 B  -2:1 leg press 75lb 10x3 B TE  -Hip abduction with slider to fatigue x 2 sets  -2:1 leg press 75lb 10x2 B  -Stationary bike L 6 x 4min  -Reverse Nordic to fatigue x 2 sets  -TRX assisted squats 10x2  -Anterior tibialis raises at wall to fatigue x 2 sets TE  -DKTC swissball x15  -HS curl swissball 6x2  -Hip hike on step to fatigue x 2 sets  -Forward lunge on 8in step 10x3 B  -4in forward step down 10x2 B  -TRX assisted squat tap to 16in surface   TE  -DKTC swissball x15  -4in eccentric lateral step down 10x2 B   TE  -Stationary bike L8 x 6min  -2:1 leg press shuttle 75lb 10x3  -Pt edu: symptom management      NM Re-ed  -RDL 6x3 B  -Anterior tib raise at wall to fatigue x 2 sets           HEP: LAQ    Charges:  TE x 2; MT x 1;     Total Timed Treatment: TE x 22min; MT x 20min;  Total Treatment Time: 42min

## 2024-03-13 ENCOUNTER — OFFICE VISIT (OUTPATIENT)
Dept: PHYSICAL THERAPY | Facility: HOSPITAL | Age: 64
End: 2024-03-13
Attending: ORTHOPAEDIC SURGERY
Payer: COMMERCIAL

## 2024-03-13 ENCOUNTER — HOSPITAL ENCOUNTER (OUTPATIENT)
Dept: MAMMOGRAPHY | Age: 64
Discharge: HOME | End: 2024-03-13
Attending: FAMILY MEDICINE
Payer: COMMERCIAL

## 2024-03-13 DIAGNOSIS — Z12.31 ENCOUNTER FOR SCREENING MAMMOGRAM FOR MALIGNANT NEOPLASM OF BREAST: ICD-10-CM

## 2024-03-13 PROCEDURE — 97110 THERAPEUTIC EXERCISES: CPT

## 2024-03-13 PROCEDURE — 77067 SCR MAMMO BI INCL CAD: CPT | Performed by: FAMILY MEDICINE

## 2024-03-13 PROCEDURE — 77063 BREAST TOMOSYNTHESIS BI: CPT | Performed by: FAMILY MEDICINE

## 2024-03-13 PROCEDURE — 97140 MANUAL THERAPY 1/> REGIONS: CPT

## 2024-03-13 NOTE — PROGRESS NOTES
Diagnosis:   Knee pain (M25.569)        Referring Provider: Esa  Date of Evaluation:    1/17/24    Precautions:  None Next MD visit:   none scheduled  Date of Surgery: n/a   Insurance Primary/Secondary: [employer] P[place] / N/A     # Auth Visits: 30v policy            Subjective: Patient notes clicking in R knee since yesterday.   Current functional limitations include floor transfers, deep squatting, leg press.    Pain: 4/10      Objective:     MMT  Knee extension: 4/5 RLE; 4+/5 LLE         Assessment: Patellofemoral taping performed this session--subjective reports of improvements in eccentric step down with decreased pain and crepitus following. Capitalized on symptom modification with closed chain quad strengthening. Fatigue achieved with no limitation of pain.      Goals:   (to be met in 10 visits)  Pt will improve knee extension ROM to 0 deg to allow proper heel strike during gait and terminal knee extension in stance   Pt will improve knee AROM flexion to >125 degrees to improve ability to perform kneeling and floor transfers   Pt will improve quad strength to 5/5 to ascend 1 flight of stairs reciprocally without UE assist   Pt will increase hip and knee strength to grossly 4+/5 to be able to get up and down from the floor safely   Pt will demonstrate increased hip ER/ABD strength to 4+/5 to perform stepping and squatting activities without excessive femoral IR/ADD   Pt will improve SLS to 20s to improve safety with gait on uneven surfaces such as grass and gravel  Pt will be independent and compliant with comprehensive HEP to maintain progress achieved in PT     Plan: Patient will be seen for 1-2 x/week or a total of 10 visits over a 90 day period. Treatment will include: Gait training, Manual Therapy, Neuromuscular Re-education, Self-Care Home Management, Therapeutic Activities, Therapeutic Exercise, and Home Exercise Program instruction   Date: 1/26/2024  TX#: 2/10 Date:1/31/2024                 TX#: 3/10  Date: 2/13/2024                TX#: 4/10 Date: 2/14/2024                TX#: 5/10 Date: 3/11/14  Tx#: 6/10 Date: 3/13/2024  Tx#: 7/10   MT  -STW VMO/VL RLE   MT  -Be Activated technique: psoas, glute quad,  MT  -Be Activated technique: psoas, glute quad,  MT  -Thornton taping patellofemoral medial shift   TE  -LAQ green t-band to fatigue x 3 sets  -DKTC SB 20x2  -SLP 42lb 10x3 B  -2:1 leg press 75lb 10x3 B TE  -Hip abduction with slider to fatigue x 2 sets  -2:1 leg press 75lb 10x2 B  -Stationary bike L 6 x 4min  -Reverse Nordic to fatigue x 2 sets  -TRX assisted squats 10x2  -Anterior tibialis raises at wall to fatigue x 2 sets TE  -DKTC swissball x15  -HS curl swissball 6x2  -Hip hike on step to fatigue x 2 sets  -Forward lunge on 8in step 10x3 B  -4in forward step down 10x2 B  -TRX assisted squat tap to 16in surface   TE  -DKTC swissball x15  -4in eccentric lateral step down 10x2 B   TE  -Stationary bike L8 x 6min  -2:1 leg press shuttle 75lb 10x3  -Pt edu: symptom management TE  -Pt edu: self patellar mobilization  -SLP 62lb 10x3 B  -Forward lunge on 6in step for quad loading 10x3 B  -Sit/stand with black t-band for knee extension bias 10x2  -Gastroc stretch on slant board 10sec x 6      NM Re-ed  -RDL 6x3 B  -Anterior tib raise at wall to fatigue x 2 sets             HEP: LAQ    Charges:  TE x 2; MT x 1;     Total Timed Treatment: TE x 30min; MT x 15min;  Total Treatment Time: 45min

## 2024-03-18 ENCOUNTER — OFFICE VISIT (OUTPATIENT)
Dept: PHYSICAL THERAPY | Facility: HOSPITAL | Age: 64
End: 2024-03-18
Attending: ORTHOPAEDIC SURGERY
Payer: COMMERCIAL

## 2024-03-18 PROCEDURE — 97140 MANUAL THERAPY 1/> REGIONS: CPT

## 2024-03-18 PROCEDURE — 97110 THERAPEUTIC EXERCISES: CPT

## 2024-03-18 NOTE — PROGRESS NOTES
Diagnosis:   Knee pain (M25.569)        Referring Provider: Esa  Date of Evaluation:    1/17/24    Precautions:  None Next MD visit:   none scheduled  Date of Surgery: n/a   Insurance Primary/Secondary: [employer] / N/A     # Auth Visits: 30v policy            Subjective: Patient notes continued clicking in knee, but no pain or swelling. States that the taping technique was very helpful.   Current functional limitations include floor transfers, deep squatting, leg press.    Pain: 0/10      Objective:     MMT  Knee extension: 4/5 RLE; 4+/5 LLE         Assessment: Performed Thornton taping technique start of session for improved tolerance to closed chain loading of right knee. Patient demonstrates limitations in right quad force production at greater degree of knee flexion. Required use of upper extremity support to perform functional lunge and step up requiring 70deg and greater of knee flexion. Able to complete session with no subjective increase in symptoms.       Goals:   (to be met in 10 visits)  Pt will improve knee extension ROM to 0 deg to allow proper heel strike during gait and terminal knee extension in stance   Pt will improve knee AROM flexion to >125 degrees to improve ability to perform kneeling and floor transfers   Pt will improve quad strength to 5/5 to ascend 1 flight of stairs reciprocally without UE assist   Pt will increase hip and knee strength to grossly 4+/5 to be able to get up and down from the floor safely   Pt will demonstrate increased hip ER/ABD strength to 4+/5 to perform stepping and squatting activities without excessive femoral IR/ADD   Pt will improve SLS to 20s to improve safety with gait on uneven surfaces such as grass and gravel  Pt will be independent and compliant with comprehensive HEP to maintain progress achieved in PT     Plan: Patient will be seen for 1-2 x/week or a total of 10 visits over a 90 day period. Treatment will include: Gait training, Manual Therapy,  Neuromuscular Re-education, Self-Care Home Management, Therapeutic Activities, Therapeutic Exercise, and Home Exercise Program instruction   Date: 1/26/2024  TX#: 2/10 Date:1/31/2024                 TX#: 3/10 Date: 2/13/2024                TX#: 4/10 Date: 2/14/2024                TX#: 5/10 Date: 3/11/14  Tx#: 6/10 Date: 3/13/2024  Tx#: 7/10 Date: 3/18/2024  Tx#: 8/10     MT  -STW VMO/VL RLE   MT  -Be Activated technique: psoas, glute quad,  MT  -Be Activated technique: psoas, glute quad,  MT  -Thornton taping patellofemoral medial shift MT  -Thornton taping patellofemoral medial shift   TE  -LAQ green t-band to fatigue x 3 sets  -DKTC SB 20x2  -SLP 42lb 10x3 B  -2:1 leg press 75lb 10x3 B TE  -Hip abduction with slider to fatigue x 2 sets  -2:1 leg press 75lb 10x2 B  -Stationary bike L 6 x 4min  -Reverse Nordic to fatigue x 2 sets  -TRX assisted squats 10x2  -Anterior tibialis raises at wall to fatigue x 2 sets TE  -DKTC swissball x15  -HS curl swissball 6x2  -Hip hike on step to fatigue x 2 sets  -Forward lunge on 8in step 10x3 B  -4in forward step down 10x2 B  -TRX assisted squat tap to 16in surface   TE  -DKTC swissball x15  -4in eccentric lateral step down 10x2 B   TE  -Stationary bike L8 x 6min  -2:1 leg press shuttle 75lb 10x3  -Pt edu: symptom management TE  -Pt edu: self patellar mobilization  -SLP 62lb 10x3 B  -Forward lunge on 6in step for quad loading 10x3 B  -Sit/stand with black t-band for knee extension bias 10x2  -Gastroc stretch on slant board 10sec x 6 TE  -2:1 leg press shuttle 80lb 10x3  -Lunge to 8in step with airex 8x3 B (with single UE support)  -forward step up 14in step (BUE to single UE support for balance and force production) 8x3 B         NM Re-ed  -RDL 6x3 B  -Anterior tib raise at wall to fatigue x 2 sets               HEP: LAQ    Charges:  TE x 2; MT x 1;     Total Timed Treatment: TE x 30min; MT x 15min;  Total Treatment Time: 45min

## 2024-03-22 ENCOUNTER — APPOINTMENT (OUTPATIENT)
Dept: PHYSICAL THERAPY | Facility: HOSPITAL | Age: 64
End: 2024-03-22
Attending: ORTHOPAEDIC SURGERY
Payer: COMMERCIAL

## 2024-03-27 ENCOUNTER — OFFICE VISIT (OUTPATIENT)
Dept: PHYSICAL THERAPY | Facility: HOSPITAL | Age: 64
End: 2024-03-27
Attending: ORTHOPAEDIC SURGERY
Payer: COMMERCIAL

## 2024-03-27 PROCEDURE — 97110 THERAPEUTIC EXERCISES: CPT

## 2024-03-27 PROCEDURE — 97112 NEUROMUSCULAR REEDUCATION: CPT

## 2024-03-27 NOTE — PROGRESS NOTES
Diagnosis:   Knee pain (M25.569)        Referring Provider: Esa  Date of Evaluation:    1/17/24    Precautions:  None Next MD visit:   none scheduled  Date of Surgery: n/a   Insurance Primary/Secondary: [employer] P[place] / N/A     # Auth Visits: 30v policy            Subjective: Patient notes continued clicking in knee, but no pain or swelling. States that the taping technique was very helpful.   Current functional limitations include floor transfers, deep squatting, leg press.    Pain: 0/10      Objective:     MMT  Knee extension: 4/5 RLE; 4+/5 LLE         Assessment: No taping performed this session. Patient participates in all closed chain exercise with appropriate fatigue and no symptom reproduction. Noted compensatory Trendelenburg for lateral step down to avoid greater degree of flexion at knee. Patient able to improve pelvic stability with intermittent tactile cues.       Goals:   (to be met in 10 visits)  Pt will improve knee extension ROM to 0 deg to allow proper heel strike during gait and terminal knee extension in stance   Pt will improve knee AROM flexion to >125 degrees to improve ability to perform kneeling and floor transfers   Pt will improve quad strength to 5/5 to ascend 1 flight of stairs reciprocally without UE assist   Pt will increase hip and knee strength to grossly 4+/5 to be able to get up and down from the floor safely   Pt will demonstrate increased hip ER/ABD strength to 4+/5 to perform stepping and squatting activities without excessive femoral IR/ADD   Pt will improve SLS to 20s to improve safety with gait on uneven surfaces such as grass and gravel  Pt will be independent and compliant with comprehensive HEP to maintain progress achieved in PT     Plan: Patient will be seen for 1-2 x/week or a total of 10 visits over a 90 day period. Treatment will include: Gait training, Manual Therapy, Neuromuscular Re-education, Self-Care Home Management, Therapeutic Activities, Therapeutic  Exercise, and Home Exercise Program instruction   Date: 2/13/2024                TX#: 4/10 Date: 2/14/2024                TX#: 5/10 Date: 3/11/14  Tx#: 6/10 Date: 3/13/2024  Tx#: 7/10 Date: 3/18/2024  Tx#: 8/10   Date: 3/27/2024  Tx#: 9/10    MT  -Be Activated technique: psoas, glute quad,  MT  -Be Activated technique: psoas, glute quad,  MT  -Thornton taping patellofemoral medial shift MT  -Thornton taping patellofemoral medial shift    TE  -DKTC swissball x15  -HS curl swissball 6x2  -Hip hike on step to fatigue x 2 sets  -Forward lunge on 8in step 10x3 B  -4in forward step down 10x2 B  -TRX assisted squat tap to 16in surface   TE  -DKTC swissball x15  -4in eccentric lateral step down 10x2 B   TE  -Stationary bike L8 x 6min  -2:1 leg press shuttle 75lb 10x3  -Pt edu: symptom management TE  -Pt edu: self patellar mobilization  -SLP 62lb 10x3 B  -Forward lunge on 6in step for quad loading 10x3 B  -Sit/stand with black t-band for knee extension bias 10x2  -Gastroc stretch on slant board 10sec x 6 TE  -2:1 leg press shuttle 80lb 10x3  -Lunge to 8in step with airex 8x3 B (with single UE support)  -forward step up 14in step (BUE to single UE support for balance and force production) 8x3 B    TE  -Dynamic warm-up variation x 5min  -TRX assisted squat to 16in plyobox with complete sit at max depth of squat. 8x3  -6in Lateral step down with UE support 10x2 B  -Lunge to 8in step with airex 8x3 B (with single UE support)    NM Re-ed  -RDL 6x3 B  -Anterior tib raise at wall to fatigue x 2 sets    NM Re-ed  -Y balance posteromedial reach with staff for light balance 8x3B             HEP: LAQ    Charges:  TE x 2; NM x1;     Total Timed Treatment: TE x 31min; NM x 8min;  Total Treatment Time: 39min

## 2024-04-03 ENCOUNTER — OFFICE VISIT (OUTPATIENT)
Dept: PHYSICAL THERAPY | Facility: HOSPITAL | Age: 64
End: 2024-04-03
Attending: ORTHOPAEDIC SURGERY
Payer: COMMERCIAL

## 2024-04-03 PROCEDURE — 97112 NEUROMUSCULAR REEDUCATION: CPT

## 2024-04-03 PROCEDURE — 97110 THERAPEUTIC EXERCISES: CPT

## 2024-04-03 NOTE — PROGRESS NOTES
Diagnosis:   Knee pain (M25.569)        Referring Provider: Esa  Date of Evaluation:    1/17/24    Precautions:  None Next MD visit:   none scheduled  Date of Surgery: n/a   Insurance Primary/Secondary: [employer] P[place] / N/A     # Auth Visits: 30v policy            Subjective: Patient states that she worked out yesterday; did curtsey lunges. Feeling soreness along left anterior knee today.   Current functional limitations include floor transfers, deep squatting, leg press.    Pain: 0/10      Objective:     MMT  Knee extension: 4/5 RLE; 4+/5 LLE         Assessment: Patient exhibits limited quad strength at greater degree of knee flexion; this negatively impacts patient's ability to rise from a low seated surface. Targeted quad and hip strengthening to fatigue. Subjective reports of improvements in left knee pain post-treat.       Goals:   (to be met in 10 visits)  Pt will improve knee extension ROM to 0 deg to allow proper heel strike during gait and terminal knee extension in stance   Pt will improve knee AROM flexion to >125 degrees to improve ability to perform kneeling and floor transfers   Pt will improve quad strength to 5/5 to ascend 1 flight of stairs reciprocally without UE assist   Pt will increase hip and knee strength to grossly 4+/5 to be able to get up and down from the floor safely   Pt will demonstrate increased hip ER/ABD strength to 4+/5 to perform stepping and squatting activities without excessive femoral IR/ADD   Pt will improve SLS to 20s to improve safety with gait on uneven surfaces such as grass and gravel  Pt will be independent and compliant with comprehensive HEP to maintain progress achieved in PT     Plan: Patient will be seen for 1-2 x/week or a total of 10 visits over a 90 day period. Treatment will include: Gait training, Manual Therapy, Neuromuscular Re-education, Self-Care Home Management, Therapeutic Activities, Therapeutic Exercise, and Home Exercise Program instruction   Date:  2/13/2024                TX#: 4/10 Date: 2/14/2024                TX#: 5/10 Date: 3/11/14  Tx#: 6/10 Date: 3/13/2024  Tx#: 7/10 Date: 3/18/2024  Tx#: 8/10   Date: 3/27/2024  Tx#: 9/10 Date: 4/3/2024  Tx#: 10/10    MT  -Be Activated technique: psoas, glute quad,  MT  -Be Activated technique: psoas, glute quad,  MT  -Thornton taping patellofemoral medial shift MT  -Thornton taping patellofemoral medial shift     TE  -DKTC swissball x15  -HS curl swissball 6x2  -Hip hike on step to fatigue x 2 sets  -Forward lunge on 8in step 10x3 B  -4in forward step down 10x2 B  -TRX assisted squat tap to 16in surface   TE  -DKTC swissball x15  -4in eccentric lateral step down 10x2 B   TE  -Stationary bike L8 x 6min  -2:1 leg press shuttle 75lb 10x3  -Pt edu: symptom management TE  -Pt edu: self patellar mobilization  -SLP 62lb 10x3 B  -Forward lunge on 6in step for quad loading 10x3 B  -Sit/stand with black t-band for knee extension bias 10x2  -Gastroc stretch on slant board 10sec x 6 TE  -2:1 leg press shuttle 80lb 10x3  -Lunge to 8in step with airex 8x3 B (with single UE support)  -forward step up 14in step (BUE to single UE support for balance and force production) 8x3 B    TE  -Dynamic warm-up variation x 5min  -TRX assisted squat to 16in plyobox with complete sit at max depth of squat. 8x3  -6in Lateral step down with UE support 10x2 B  -Lunge to 8in step with airex 8x3 B (with single UE support) TE  -Prone quad stretch 30sec 4  -TRX assisted squat to 16in plyobox with complete sit at max depth of squat. 8x3  -Hip hike on step 5sec hold to fatigue x sets bilaterally    NM Re-ed  -RDL 6x3 B  -Anterior tib raise at wall to fatigue x 2 sets    NM Re-ed  -Y balance posteromedial reach with staff for light balance 8x3B   NM Re-ed  -Y balance posteromedial reach with staff for light balance 8x3B  -Reverse Nordic to fatigue x 2            HEP: LAQ    Charges:  TE x 2; NM x1;     Total Timed Treatment: TE x 28min; NM x 14min;  Total  Treatment Time: 42min

## 2024-04-10 ENCOUNTER — OFFICE VISIT (OUTPATIENT)
Dept: PHYSICAL THERAPY | Facility: HOSPITAL | Age: 64
End: 2024-04-10
Attending: ORTHOPAEDIC SURGERY
Payer: COMMERCIAL

## 2024-04-10 PROCEDURE — 97110 THERAPEUTIC EXERCISES: CPT

## 2024-04-10 NOTE — PROGRESS NOTES
Diagnosis:   Knee pain (M25.569)        Referring Provider: Esa  Date of Evaluation:    1/17/24    Precautions:  None Next MD visit:   none scheduled  Date of Surgery: n/a   Insurance Primary/Secondary: [employer] P[place] / N/A     # Auth Visits: 30v policy           Progress Summary  Pt has attended 11 visits in Physical Therapy.     Subjective: Patient states that her knee is sore from work out yesterday. Overall, notices improvements in strength of her legs and control into greater degree of knee flexion.  Current functional limitations include floor transfers, deep squatting, leg press.    Pain: 0/10      Objective:     AROM: (* denotes performed with pain)  Knee 4/10/2024   Flexion: R 130*; L 130*  Extension: R -2; L -2    Flexion: R 130; L 130  Extension: R ; L 0       Strength/MMT: (* denotes performed with pain)  Hip 4/10/2024 Knee 4/10/2024   Flexion: R 5/5; L 5/5  Extension: R 4+/5; L 4+/5  Abduction: R 4+/5; L 4+/5  ER: R 5/5; L 5/5  IR: R 5/5; L 5/5 Flexion: R 5/5; L 5/5  Extension: R 4+/5; L 4+/5         Balance: SLS: R 30 sec, L 30 sec       Assessment: Patient has attended 11 visits of physical therapy. She has made excellent progress with lower extremity strength and pain-free knee ROM. This has allowed for improvements in pain-free stair negotiation and squatting. She continues to be limited with functional quad strength at greater degree of knee flexion required to perform floor transfers and stand from a lower seated surface. Recommend continuation of skilled care to address remaining impairments and transition to home program.      Goals:   (to be met in 10 visits)  Pt will improve knee extension ROM to 0 deg to allow proper heel strike during gait and terminal knee extension in stance MET  Pt will improve knee AROM flexion to >125 degrees to improve ability to perform kneeling and floor transfers MET  Pt will improve quad strength to 5/5 to ascend 1 flight of stairs reciprocally without UE assist    Pt will increase hip and knee strength to grossly 4+/5 to be able to get up and down from the floor safely   Pt will demonstrate increased hip ER/ABD strength to 4+/5 to perform stepping and squatting activities without excessive femoral IR/ADD   Pt will improve SLS to 20s to improve safety with gait on uneven surfaces such as grass and gravelMET  Pt will be independent and compliant with comprehensive HEP to maintain progress achieved in PT     Plan: Continue skilled Physical Therapy 1-2 x/week or a total of 4 visits over a 90 day period. Treatment will include: manual therapy, therapeutic activity, therapeutic exercise, neuromuscular re-ed, ADL simulated activities       Patient/Family/Caregiver was advised of these findings, precautions, and treatment options and has agreed to actively participate in planning and for this course of care.    Thank you for your referral. If you have any questions, please contact me at Dept: [phone].    Sincerely,  Electronically signed by therapist: Sabina Soriano, PT     Physician's certification required:  Yes  Please co-sign or sign and return this letter via fax as soon as possible to [phone].   I certify the need for these services furnished under this plan of treatment and while under my care.    X___________________________________________________ Date____________________    Certification From: 4/11/2024  To:7/10/2024     Date: 2/13/2024                TX#: 4/10 Date: 2/14/2024                TX#: 5/10 Date: 3/11/14  Tx#: 6/10 Date: 3/13/2024  Tx#: 7/10 Date: 3/18/2024  Tx#: 8/10   Date: 3/27/2024  Tx#: 9/10 Date: 4/3/2024  Tx#: 10/10 Date: 4/10/2024  Tx#: 11    MT  -Be Activated technique: psoas, glute quad,  MT  -Be Activated technique: psoas, glute quad,  MT  -Thornton taping patellofemoral medial shift MT  -Thornton taping patellofemoral medial shift      TE  -DKTC swissball x15  -HS curl swissball 6x2  -Hip hike on step to fatigue x 2 sets  -Forward lunge  on 8in step 10x3 B  -4in forward step down 10x2 B  -TRX assisted squat tap to 16in surface   TE  -DKTC swissball x15  -4in eccentric lateral step down 10x2 B   TE  -Stationary bike L8 x 6min  -2:1 leg press shuttle 75lb 10x3  -Pt edu: symptom management TE  -Pt edu: self patellar mobilization  -SLP 62lb 10x3 B  -Forward lunge on 6in step for quad loading 10x3 B  -Sit/stand with black t-band for knee extension bias 10x2  -Gastroc stretch on slant board 10sec x 6 TE  -2:1 leg press shuttle 80lb 10x3  -Lunge to 8in step with airex 8x3 B (with single UE support)  -forward step up 14in step (BUE to single UE support for balance and force production) 8x3 B    TE  -Dynamic warm-up variation x 5min  -TRX assisted squat to 16in plyobox with complete sit at max depth of squat. 8x3  -6in Lateral step down with UE support 10x2 B  -Lunge to 8in step with airex 8x3 B (with single UE support) TE  -Prone quad stretch 30sec 4  -TRX assisted squat to 16in plyobox with complete sit at max depth of squat. 8x3  -Hip hike on step 5sec hold to fatigue x sets bilaterally TE  -assessment for PN  -TRX assisted squat to 16in plyobox with complete sit at max depth of squat. 8x3  -Sit/stand no UE support from elevated surface 5 x 3  -4in lateral step down 10x3 B      NM Re-ed  -RDL 6x3 B  -Anterior tib raise at wall to fatigue x 2 sets    NM Re-ed  -Y balance posteromedial reach with staff for light balance 8x3B   NM Re-ed  -Y balance posteromedial reach with staff for light balance 8x3B  -Reverse Nordic to fatigue x 2              HEP: LAQ    Charges:  TE x 3     Total Timed Treatment: TE x 40  Total Treatment Time: 40min

## 2024-04-17 ENCOUNTER — OFFICE VISIT (OUTPATIENT)
Dept: PHYSICAL THERAPY | Facility: HOSPITAL | Age: 64
End: 2024-04-17
Attending: ORTHOPAEDIC SURGERY
Payer: COMMERCIAL

## 2024-04-17 PROCEDURE — 97110 THERAPEUTIC EXERCISES: CPT

## 2024-04-17 NOTE — PROGRESS NOTES
Diagnosis:   Knee pain (M25.569)        Referring Provider: Esa  Date of Evaluation:    1/17/24    Precautions:  None Next MD visit:   none scheduled  Date of Surgery: n/a   Insurance Primary/Secondary: [employer] / N/A     # Auth Visits: 30v policy             Subjective: Patient states that her knee is sore from work out yesterday. Overall, notices improvements in strength of her legs and control into greater degree of knee flexion.  Current functional limitations include floor transfers, deep squatting, leg press.    Pain: 0/10      Objective:     AROM: (* denotes performed with pain)  Knee 4/10/2024   Flexion: R 130*; L 130*  Extension: R -2; L -2    Flexion: R 130; L 130  Extension: R ; L 0       Strength/MMT: (* denotes performed with pain)  Hip 4/10/2024 Knee 4/10/2024   Flexion: R 5/5; L 5/5  Extension: R 4+/5; L 4+/5  Abduction: R 4+/5; L 4+/5  ER: R 5/5; L 5/5  IR: R 5/5; L 5/5 Flexion: R 5/5; L 5/5  Extension: R 4+/5; L 4+/5         Balance: SLS: R 30 sec, L 30 sec       Assessment: Patient demonstrates pain along anteromedial knee with single leg press at max knee flexion (~90 deg). Able to participate in leg press with band to cue femoral ER with symptoms completely subsiding. Trained correction of femoral IR in double leg and single leg stance position. Diffculty achieving on RLE while maintaining contact points of foot.      Goals:   (to be met in 10 visits)  Pt will improve knee extension ROM to 0 deg to allow proper heel strike during gait and terminal knee extension in stance MET  Pt will improve knee AROM flexion to >125 degrees to improve ability to perform kneeling and floor transfers MET  Pt will improve quad strength to 5/5 to ascend 1 flight of stairs reciprocally without UE assist   Pt will increase hip and knee strength to grossly 4+/5 to be able to get up and down from the floor safely   Pt will demonstrate increased hip ER/ABD strength to 4+/5 to perform stepping and squatting  activities without excessive femoral IR/ADD   Pt will improve SLS to 20s to improve safety with gait on uneven surfaces such as grass and gravelMET  Pt will be independent and compliant with comprehensive HEP to maintain progress achieved in PT     Plan: Continue skilled Physical Therapy 1-2 x/week or a total of 4 visits over a 90 day period. Treatment will include: manual therapy, therapeutic activity, therapeutic exercise, neuromuscular re-ed, ADL simulated activities       Certification From: 4/11/2024  To:7/10/2024     Date: 2/13/2024                TX#: 4/10 Date: 2/14/2024                TX#: 5/10 Date: 3/11/14  Tx#: 6/10 Date: 3/13/2024  Tx#: 7/10 Date: 3/18/2024  Tx#: 8/10   Date: 3/27/2024  Tx#: 9/10 Date: 4/3/2024  Tx#: 10/10 Date: 4/10/2024  Tx#: 11 Date: 4/17/2024  Tx#: 12    MT  -Be Activated technique: psoas, glute quad,  MT  -Be Activated technique: psoas, glute quad,  MT  -Thornton taping patellofemoral medial shift MT  -Thornton taping patellofemoral medial shift       TE  -DKTC swissball x15  -HS curl swissball 6x2  -Hip hike on step to fatigue x 2 sets  -Forward lunge on 8in step 10x3 B  -4in forward step down 10x2 B  -TRX assisted squat tap to 16in surface   TE  -DKTC swissball x15  -4in eccentric lateral step down 10x2 B   TE  -Stationary bike L8 x 6min  -2:1 leg press shuttle 75lb 10x3  -Pt edu: symptom management TE  -Pt edu: self patellar mobilization  -SLP 62lb 10x3 B  -Forward lunge on 6in step for quad loading 10x3 B  -Sit/stand with black t-band for knee extension bias 10x2  -Gastroc stretch on slant board 10sec x 6 TE  -2:1 leg press shuttle 80lb 10x3  -Lunge to 8in step with airex 8x3 B (with single UE support)  -forward step up 14in step (BUE to single UE support for balance and force production) 8x3 B    TE  -Dynamic warm-up variation x 5min  -TRX assisted squat to 16in plyobox with complete sit at max depth of squat. 8x3  -6in Lateral step down with UE support 10x2 B  -Lunge to 8in  step with airex 8x3 B (with single UE support) TE  -Prone quad stretch 30sec 4  -TRX assisted squat to 16in plyobox with complete sit at max depth of squat. 8x3  -Hip hike on step 5sec hold to fatigue x sets bilaterally TE  -assessment for PN  -TRX assisted squat to 16in plyobox with complete sit at max depth of squat. 8x3  -Sit/stand no UE support from elevated surface 5 x 3  -4in lateral step down 10x3 B       NM Re-ed  -RDL 6x3 B  -Anterior tib raise at wall to fatigue x 2 sets    NM Re-ed  -Y balance posteromedial reach with staff for light balance 8x3B   NM Re-ed  -Y balance posteromedial reach with staff for light balance 8x3B  -Reverse Nordic to fatigue x 2  NM Re-ed  -Standing femoral ER with visual cue of mirror x 5min  -SLS with UE support with stance leg femoral ER with visual cues of mirror   -Sit/stand with focus on femoral ER  -SLP with band for femoral ER 50lb 10x3 R              HEP: LAQ    Charges:  NM x 3     Total Timed Treatment:NMx 40  Total Treatment Time: 40min   no

## 2024-04-24 ENCOUNTER — OFFICE VISIT (OUTPATIENT)
Dept: PHYSICAL THERAPY | Facility: HOSPITAL | Age: 64
End: 2024-04-24
Attending: ORTHOPAEDIC SURGERY
Payer: COMMERCIAL

## 2024-04-24 PROCEDURE — 97110 THERAPEUTIC EXERCISES: CPT

## 2024-04-24 NOTE — PROGRESS NOTES
Diagnosis:   Knee pain (M25.569)        Referring Provider: Esa  Date of Evaluation:    1/17/24    Precautions:  None Next MD visit:   none scheduled  Date of Surgery: n/a   Insurance Primary/Secondary: [employer] / N/A     # Auth Visits: 30v policy            Discharge Summary  Pt has attended 13 visits in Physical Therapy.     Subjective: Patient states that her knee is sore from work out yesterday. Overall, notices improvements in strength of her legs and control into greater degree of knee flexion.  Current functional limitations include floor transfers, deep squatting, leg press.    Pain: 0/10      Objective:     AROM: (* denotes performed with pain)  Knee 4/10/2024   Flexion: R 130*; L 130*  Extension: R -2; L -2    Flexion: R 130; L 130  Extension: R ; L 0       Strength/MMT: (* denotes performed with pain)  Hip 4/10/2024 Knee 4/10/2024   Flexion: R 5/5; L 5/5  Extension: R 4+/5; L 4+/5  Abduction: R 4+/5; L 4+/5  ER: R 5/5; L 5/5  IR: R 5/5; L 5/5 Flexion: R 5/5; L 5/5  Extension: R 4+/5; L 4+/5         Balance: SLS: R 30 sec, L 30 sec       Assessment: Patient has attended 13 visits of physical therapy. Over course of care, she has made excellent progress towards long term goals in ROM, strength, motor control, and proprioception. Patient continues to be limited with quad strength through greater degree of knee flexion, negatively impacting patient's ability to transfer from toilet and perform floor transfers. Patient is to discharge to home program to address remaining impairments independently.       Goals:   (to be met in 14 visits)  Pt will improve knee extension ROM to 0 deg to allow proper heel strike during gait and terminal knee extension in stance MET  Pt will improve knee AROM flexion to >125 degrees to improve ability to perform kneeling and floor transfers MET  Pt will improve quad strength to 5/5 to ascend 1 flight of stairs reciprocally without UE assist   Pt will increase hip and knee  strength to grossly 4+/5 to be able to get up and down from the floor safely   Pt will demonstrate increased hip ER/ABD strength to 4+/5 to perform stepping and squatting activities without excessive femoral IR/ADD   Pt will improve SLS to 20s to improve safety with gait on uneven surfaces such as grass and gravelMET  Pt will be independent and compliant with comprehensive HEP to maintain progress achieved in PT     Plan: D/C to hep    Certification From: 4/11/2024  To:7/10/2024     Date: 3/13/2024  Tx#: 7/10 Date: 3/18/2024  Tx#: 8/10   Date: 3/27/2024  Tx#: 9/10 Date: 4/3/2024  Tx#: 10/10 Date: 4/10/2024  Tx#: 11 Date: 4/17/2024  Tx#: 12 Date: 4/24/2024  Tx#: 13   MT  -Thornton taping patellofemoral medial shift MT  -Thornton taping patellofemoral medial shift        TE  -Pt edu: self patellar mobilization  -SLP 62lb 10x3 B  -Forward lunge on 6in step for quad loading 10x3 B  -Sit/stand with black t-band for knee extension bias 10x2  -Gastroc stretch on slant board 10sec x 6 TE  -2:1 leg press shuttle 80lb 10x3  -Lunge to 8in step with airex 8x3 B (with single UE support)  -forward step up 14in step (BUE to single UE support for balance and force production) 8x3 B    TE  -Dynamic warm-up variation x 5min  -TRX assisted squat to 16in plyobox with complete sit at max depth of squat. 8x3  -6in Lateral step down with UE support 10x2 B  -Lunge to 8in step with airex 8x3 B (with single UE support) TE  -Prone quad stretch 30sec 4  -TRX assisted squat to 16in plyobox with complete sit at max depth of squat. 8x3  -Hip hike on step 5sec hold to fatigue x sets bilaterally TE  -assessment for PN  -TRX assisted squat to 16in plyobox with complete sit at max depth of squat. 8x3  -Sit/stand no UE support from elevated surface 5 x 3  -4in lateral step down 10x3 B    TE  -TRX squat 10x3  -Assessment  -Pt edu: POC progress, HEP  -TRX assisted step back to mini lunge 10x2 B     NM Re-ed  -Y balance posteromedial reach with staff  for light balance 8x3B   NM Re-ed  -Y balance posteromedial reach with staff for light balance 8x3B  -Reverse Nordic to fatigue x 2  NM Re-ed  -Standing femoral ER with visual cue of mirror x 5min  -SLS with UE support with stance leg femoral ER with visual cues of mirror   -Sit/stand with focus on femoral ER  -SLP with band for femoral ER 50lb 10x3 R             HEP: LAQ    Charges:  TEx3  Total Timed Treatment:katerine 40  Total Treatment Time: 40min

## 2024-07-03 DIAGNOSIS — F41.9 ANXIETY: ICD-10-CM

## 2024-07-05 NOTE — TELEPHONE ENCOUNTER
A refill request was received for:  Requested Prescriptions     Pending Prescriptions Disp Refills    Meloxicam 15 MG Oral Tab 30 tablet 0     Sig: Take 1 tablet (15 mg total) by mouth daily as needed for Pain.    ALPRAZolam (XANAX) 0.25 MG Oral Tab 20 tablet 0     Sig: Take 1 tablet (0.25 mg total) by mouth 2 (two) times daily as needed for Anxiety.     Last refill date:  9/14/23    Last office visit: 2/5/24      Future Appointments   Date Time Provider Department Center   7/12/2024  9:40 AM Lyn Paredes APRN [id number]

## 2024-07-08 RX ORDER — MELOXICAM 15 MG/1
15 TABLET ORAL DAILY PRN
Qty: 30 TABLET | Refills: 0 | Status: SHIPPED | OUTPATIENT
Start: 2024-07-08

## 2024-07-08 RX ORDER — ALPRAZOLAM 0.25 MG/1
0.25 TABLET ORAL 2 TIMES DAILY PRN
Qty: 20 TABLET | Refills: 0 | Status: SHIPPED | OUTPATIENT
Start: 2024-07-08

## 2024-07-12 ENCOUNTER — OFFICE VISIT (OUTPATIENT)
Dept: SURGERY | Facility: CLINIC | Age: 64
End: 2024-07-12
Payer: COMMERCIAL

## 2024-07-12 ENCOUNTER — TELEPHONE (OUTPATIENT)
Dept: INTERNAL MEDICINE CLINIC | Facility: CLINIC | Age: 64
End: 2024-07-12

## 2024-07-12 VITALS
SYSTOLIC BLOOD PRESSURE: 102 MMHG | DIASTOLIC BLOOD PRESSURE: 70 MMHG | WEIGHT: 199 LBS | OXYGEN SATURATION: 97 % | BODY MASS INDEX: 30.16 KG/M2 | HEIGHT: 68.3 IN | HEART RATE: 69 BPM

## 2024-07-12 DIAGNOSIS — E78.5 DYSLIPIDEMIA: Primary | ICD-10-CM

## 2024-07-12 DIAGNOSIS — E66.9 OBESITY (BMI 30-39.9): ICD-10-CM

## 2024-07-12 DIAGNOSIS — F41.9 ANXIETY: ICD-10-CM

## 2024-07-12 DIAGNOSIS — E03.9 HYPOTHYROIDISM, UNSPECIFIED TYPE: ICD-10-CM

## 2024-07-12 DIAGNOSIS — Z51.81 ENCOUNTER FOR THERAPEUTIC DRUG MONITORING: ICD-10-CM

## 2024-07-12 PROCEDURE — 3078F DIAST BP <80 MM HG: CPT | Performed by: NURSE PRACTITIONER

## 2024-07-12 PROCEDURE — 3074F SYST BP LT 130 MM HG: CPT | Performed by: NURSE PRACTITIONER

## 2024-07-12 PROCEDURE — 99214 OFFICE O/P EST MOD 30 MIN: CPT | Performed by: NURSE PRACTITIONER

## 2024-07-12 PROCEDURE — 3008F BODY MASS INDEX DOCD: CPT | Performed by: NURSE PRACTITIONER

## 2024-07-12 RX ORDER — SEMAGLUTIDE 2.4 MG/.75ML
2.4 INJECTION, SOLUTION SUBCUTANEOUS WEEKLY
Qty: 9 ML | Refills: 1 | Status: SHIPPED | OUTPATIENT
Start: 2024-07-12

## 2024-07-12 NOTE — TELEPHONE ENCOUNTER
Spoke with patient regarding Virtual appointment is fine by Dr Nails.   [facility]

## 2024-07-12 NOTE — PATIENT INSTRUCTIONS
Prolon    Fasting/Mimicking    [employer] [facility]    Psyllium Husk- [facility] [employer]. 2 PM

## 2024-07-12 NOTE — TELEPHONE ENCOUNTER
Patient called back regarding message received for an appointment with .  She would like to know if this can be a virtual appointment?  Please advise.

## 2024-07-12 NOTE — PROGRESS NOTES
[facility], [facility], [place]  [address]  [facility] [address]  Dept: [phone]       Patient:  Parris Paredes  :      1960  MRN:      [id number]    Chief Complaint:    Chief Complaint   Patient presents with    Follow - Up    Weight Management       SUBJECTIVE     History of Present Illness:  Parris is being seen today for a follow-up for medically supported weight loss.      Doing well on Wegovy.     Initial HPI:  63 yo female.   Presents to clinic for assistance with weight loss/management.    Patient is considering medications and is not currently a candidate for bariatric surgery for weight loss.    Patient denies any history of eating disorder(s).    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  Patient lives with daughter (25 year old). 3 children total.    Patient's goal weight: 170-180 lbs  Biggest weight loss in the past:    How weight loss was achieved: WW  Heaviest weight ever: Current   Previous use of medical weight loss medications: Stimulant a few months- no significant weight change    Physical activity:  2 days/week; weight training; walks 1 day/week     Sleep: 7-8 hours/night    Sleep screening: planning sleep study       Past Medical History:   Past Medical History:    Anxiety    Thyroid disease        Comorbidities:  Hyperlipidemia-Improvement?  yes    OBJECTIVE     Vitals: /70   Pulse 69   Ht 5' 8.3\" (1.735 m)   Wt 199 lb (90.3 kg)   SpO2 97%   BMI 29.99 kg/m²     Initial weight loss: -06   Total weight loss: -55   Start weight: 254   Weight loss % to date: 21.65%    Wt Readings from Last 6 Encounters:   24 199 lb (90.3 kg)   24 205 lb (93 kg)   24 207 lb (93.9 kg)   24 207 lb 9.6 oz (94.2 kg)   11/10/23 215 lb (97.5 kg)   23 235 lb 12.8 oz (107 kg)       Patient Medications:    Current Outpatient Medications   Medication Sig Dispense Refill     semaglutide-weight management (WEGOVY) 2.4 MG/0.75ML Subcutaneous Solution Auto-injector Inject 0.75 mL (2.4 mg total) into the skin once a week. 9 mL 1    Meloxicam 15 MG Oral Tab Take 1 tablet (15 mg total) by mouth daily as needed for Pain. 30 tablet 0    ALPRAZolam (XANAX) 0.25 MG Oral Tab Take 1 tablet (0.25 mg total) by mouth 2 (two) times daily as needed for Anxiety. 20 tablet 0    levothyroxine 88 MCG Oral Tab Take 1 tablet (88 mcg total) by mouth before breakfast. 90 tablet 3     Allergies:  Patient has no known allergies.     Social History:  Reviewed    Surgical History:    Past Surgical History:   Procedure Laterality Date    Knee surgery Bilateral     Faviola biopsy stereo nodule 1 site right (cpt=19081)       Family History:    Family History   Problem Relation Age of Onset    Hypertension Father     Heart Disease Father     Other (afib) Father          95     Initial Intake:  Likes eating, likes going out to eat   After dinner behavior: + ishan crackers, 100 calorie bar  Night eating: -  Portion sizes: occasional    Binge: -  Emotional: -  Depression: Score: 1  Grazing: -  Sweet tooth: +  Crunchy/salty: -  Etoh: 3-6 drinks/week; socially  Good water intake, 3 cups coffee/day   Soda Drinker: Yes                 If yes, how much?:  Diet coke every other day [employer]   Sports Drinks:  No                  Juice:  No                     Number of restaurant or fast food meals/week: 2 meals/week  Very limited FF      Food Journal  Reviewed and Discussed:       Patient has a Food Journal?: yes MFP  Patient is reading nutrition labels?  yes  Average Caloric Intake: 9728-2294/day     Average CHO Intake:   Protein goal: 80-90 g/day  Is patient exercising? yes  Type of exercise? Trainer 2 days/week; weights  Walks 1-2 days/week     Eating Habits  Patient states the following:  Eats 3 meal(s) per day  Length of time it takes to consume a meal:    # of snacks per day:    Type of snacks:    Amount of soda  consumption per day:  Decreased significantly   Amount of water (in ounces) per day:   Toughest challenge:     B: oatmeal; eggs; kodiak waffle  L: protein shake with produce  D: protein, starch, vegetables    Met with NEYMAR Wilcox in the past     Nutritional Goals  Eat 3-4 cups of fresh fruits or vegetables daily    Behavior Modifications Reviewed and Discussed  Eat breakfast, Eat 3 meals per day, Plan meals in advance, Read nutrition labels, Drink 64 oz of water per day, Maintain a daily food journal, Utlize portion control strategies to reduce calorie intake, Identify triggers for eating and manage cues and Eat slowly and take 20 to 30 minutes to complete each meal    Exercise Goals Reviewed and Discussed:    Increase walking   Aim for 150 minutes moderate level exercise weekly with 2-3 days strength training as tolerated     ROS:    Constitutional: negative  Respiratory: negative  Cardiovascular: positive for lower extremity edema and occasionally   Gastrointestinal: positive for reflux symptoms and with wine intake   Integument/breast: negative  Hematologic/lymphatic: negative  Musculoskeletal:positive for arthralgias and knees   Neurological: positive for headaches  Behavioral/Psych: negative  Endocrine: negative  All other systems were reviewed and are negative    Physical Exam:  General appearance: alert, appears stated age, cooperative and obese  Head: Normocephalic, without obvious abnormality, atraumatic  Neck: no adenopathy, no carotid bruit, no JVD, supple, symmetrical, trachea midline and thyroid not enlarged, symmetric, no tenderness/mass/nodules  Lungs: clear to auscultation bilaterally  Heart: S1, S2 normal, no murmur, click, rub or gallop, regular rate and rhythm  Abdomen: soft, non-tender; bowel sounds normal; no masses,  no organomegaly and abdomen obese   Extremities: intact, no edema   Pulses: 2+ and symmetric  Skin: intact   Neurologic: Grossly normal      ASSESSMENT     Encounter Diagnosis(ses):    Encounter Diagnoses   Name Primary?    Dyslipidemia Yes    Obesity (BMI 30-39.9)     Encounter for therapeutic drug monitoring     Hypothyroidism, unspecified type     Anxiety        PLAN       Diagnoses and all orders for this visit:    Dyslipidemia    Obesity (BMI 30-39.9)  -     semaglutide-weight management (WEGOVY) 2.4 MG/0.75ML Subcutaneous Solution Auto-injector; Inject 0.75 mL (2.4 mg total) into the skin once a week.    Encounter for therapeutic drug monitoring    Hypothyroidism, unspecified type    Anxiety      DYSLIPIDEMIA: Recommend dietary changes and lifestyle modifications as discussed below. Monitor.       Lab Results   Component Value Date/Time    CHOLEST 182 01/31/2024 12:27 PM    LDL 98 01/31/2024 12:27 PM    HDL 69 (H) 01/31/2024 12:27 PM    TRIG 83 01/31/2024 12:27 PM    VLDL 14 01/31/2024 12:27 PM     OBESITY/WEIGHT GAIN:     Recommended patient continue intensive lifestyle and behavioral modifications at this time for weight loss.     Reviewed lifestyle modifications: Whole Food/Plant Strong/Low Glycemic Index diet, moderate alcohol consumption, reduced sodium intake to no more than 2,400 mg/day, and at least 150 minutes of moderate physical activity per week.   Avoid processed, poor quality carbohydrates, refined grains, flour, sugar.     Goals for next month:  1. Keep a food log.   2. Drink 64 ounces of non-caloric beverages per day. No fruit juices or regular soda.  3. Aim for 150 minutes moderate exercise per week.    4. Increase fruit and vegetable servings to 5-6 per day.    5. Improve sleep and stress.      Reviewed other labs:  12/1/22- Alk phosphatase mildly elevated- monitor.   CMP otherwise in range. Thyroid studies in range.   Elevated WBCs- monitor.  1/31/24- TSH, CMP, CBC in range. Non-fasting.      Discussed medication options for weight loss in detail with patient.      Denies personal or family hx medullary thyroid CA, endocrine neoplasia syndrome, pancreatitis hx, suicidal  ideation. No renal impairment, severe GI disease, diabetes, pancreatitis risks noted.     Continue Wegovy 2.4 mg weekly.   Weight loss % to date: 21.65%.    SQ administration teaching provided to patient.   Discussed risks, benefits, and side effects of medication.     Met with integrative RD.   Aim for 70-75 grams protein/day.    Senna tea as needed and psyllium husk daily for constipation.     Goal: 170-180 lbs.      RTC 4 months.     HEAVENLY Bose

## 2024-08-27 NOTE — PROGRESS NOTES
Virtual VIDEO Check-In  Patient verbally consents to a Virtual/Telephone Check-In visit on 24  Patient understands and accepts financial responsibility for any deductible, co-insurance and/or co-pays associated with this service.    Duration of the service:  minutes    [place] in [place]    CC:  No chief complaint on file.      Hx of CC:    Med management    Meloxicam- prn for achilles tendon     Xanax- for flying only   Recently refilled  Otherwise no anxiety       Has lost 60 lbs on wegovy and feels much better on it    Had covid  Feels well aside  from L ear very clogged  Problems with this ear in the past   No pain or fevers  She has tried Sudafed, Flonase, Zyrtec with no relief.      Allergies:  No Known Allergies   Current Meds:  Current Outpatient Medications   Medication Sig Dispense Refill    semaglutide-weight management (WEGOVY) 2.4 MG/0.75ML Subcutaneous Solution Auto-injector Inject 0.75 mL (2.4 mg total) into the skin once a week. 9 mL 1    Meloxicam 15 MG Oral Tab Take 1 tablet (15 mg total) by mouth daily as needed for Pain. 30 tablet 0    ALPRAZolam (XANAX) 0.25 MG Oral Tab Take 1 tablet (0.25 mg total) by mouth 2 (two) times daily as needed for Anxiety. 20 tablet 0    levothyroxine 88 MCG Oral Tab Take 1 tablet (88 mcg total) by mouth before breakfast. 90 tablet 3        History:  Past Medical History:    Anxiety    Thyroid disease      Past Surgical History:   Procedure Laterality Date    Knee surgery Bilateral     Faviola biopsy stereo nodule 1 site right (cpt=19081)        Family History   Problem Relation Age of Onset    Hypertension Father     Heart Disease Father     Other (afib) Father          95      Family Status   Relation Status    Mo Alive    Fa Alive      Social History     Socioeconomic History    Marital status:    Tobacco Use    Smoking status: Never    Smokeless tobacco: Never   Vaping Use    Vaping status: Never Used   Substance and Sexual Activity    Alcohol  use: Yes     Alcohol/week: 0.0 standard drinks of alcohol     Comment: Socially     Drug use: No   Other Topics Concern    Caffeine Concern Yes     Comment: Coffee, soda - 4 cups per day             ROS:    Doing well  Just got over covid  No current joint pains   Per HPI    Physical:    Phone visit     General:  Alert, appropriate, no acute distress  Pt speaking comfortably in full sentence  Psych: normal speech       Assessment and Plan:    1. Fear of flying  Xanax recently refilled  Can refill again prior to her physical if needed  Anxiety is surrounding flying only.  Otherwise mood good  Pt given prescription for benzodiazepines. Explained that this is for prn use, such as a panic attack and these medications can be  addictive and tolerance can develop. If patient is needing this medication too much, a daily medication or change in their medication will be needed to help control their anxiety. Pt agrees to this.       2. Achilles tendinitis, unspecified laterality  Meloxicam refilled for as needed use.  Currently her Achilles is not bothering her    3. Disorder of left eustachian tube  Has tried Sudafed, Flonase, and Zyrtec with no relief.  Has seen ENT for eustachian  tube dysfunction in the past.  Will do trial of prednisone.  If not improving or has pain or fever needs to come in for in person evaluation.  - predniSONE 20 MG Oral Tab; Take 1 tablet (20 mg total) by mouth daily.  Dispense: 5 tablet; Refill: 0      There are no diagnoses linked to this encounter.  None  No orders of the defined types were placed in this encounter.      Video visits are being conducted currently because of the coronavirus pandemic.    This has been done in good nestor to provide continuity of care in the best interest of the provider-patient relationship, due to the ongoing public health crisis/national emergency and because of restrictions of visitation.  There are limitations of this visit as no physical exam could be  performed.  Every conscious effort was taken to allow for sufficient and adequate time.  This billing was spent on reviewing labs, medications, radiology tests and decision making.  Appropriate medical decision-making and tests are ordered as detailed in the plan of care above.  Pt to come in to the office for in person visit as soon as she is able to and exposure risk is less.

## 2024-10-22 ENCOUNTER — OFFICE VISIT (OUTPATIENT)
Dept: OTOLARYNGOLOGY | Facility: CLINIC | Age: 64
End: 2024-10-22
Payer: COMMERCIAL

## 2024-10-22 DIAGNOSIS — H69.90 EUSTACHIAN TUBE DISORDER, UNSPECIFIED LATERALITY: Primary | ICD-10-CM

## 2024-10-22 DIAGNOSIS — H65.92 FLUID LEVEL BEHIND TYMPANIC MEMBRANE OF LEFT EAR: ICD-10-CM

## 2024-10-22 PROCEDURE — 31231 NASAL ENDOSCOPY DX: CPT | Performed by: STUDENT IN AN ORGANIZED HEALTH CARE EDUCATION/TRAINING PROGRAM

## 2024-10-22 PROCEDURE — 99214 OFFICE O/P EST MOD 30 MIN: CPT | Performed by: STUDENT IN AN ORGANIZED HEALTH CARE EDUCATION/TRAINING PROGRAM

## 2024-10-22 PROCEDURE — 92504 EAR MICROSCOPY EXAMINATION: CPT | Performed by: STUDENT IN AN ORGANIZED HEALTH CARE EDUCATION/TRAINING PROGRAM

## 2024-10-22 RX ORDER — PREDNISONE 20 MG/1
TABLET ORAL
Qty: 12 TABLET | Refills: 0 | Status: SHIPPED | OUTPATIENT
Start: 2024-10-22 | End: 2024-10-30

## 2024-10-22 RX ORDER — PSEUDOEPHEDRINE HCL 120 MG/1
120 TABLET, FILM COATED, EXTENDED RELEASE ORAL EVERY 12 HOURS
Qty: 28 TABLET | Refills: 0 | Status: SHIPPED | OUTPATIENT
Start: 2024-10-22 | End: 2024-11-05

## 2024-10-22 NOTE — PROGRESS NOTES
Parris Paredes is a 64 year old female.   Chief Complaint   Patient presents with    Ear Problem     C/o left clogged ear, possible ear infection      HPI:   64-year-old with another bouts of a clogged left ear.  Was seen in February for similar issue required a myringotomy.  This has happened about 3 times each following an upper respiratory infection    Current Outpatient Medications   Medication Sig Dispense Refill    predniSONE 20 MG Oral Tab Take 2 tablets (40 mg total) by mouth daily for 4 days, THEN 1 tablet (20 mg total) daily for 4 days. 12 tablet 0    pseudoephedrine  MG Oral Tablet 12 Hr Take 1 tablet (120 mg total) by mouth every 12 (twelve) hours for 14 days. 28 tablet 0    predniSONE 20 MG Oral Tab Take 1 tablet (20 mg total) by mouth daily. 5 tablet 0    semaglutide-weight management (WEGOVY) 2.4 MG/0.75ML Subcutaneous Solution Auto-injector Inject 0.75 mL (2.4 mg total) into the skin once a week. 9 mL 1    Meloxicam 15 MG Oral Tab Take 1 tablet (15 mg total) by mouth daily as needed for Pain. 30 tablet 0    ALPRAZolam (XANAX) 0.25 MG Oral Tab Take 1 tablet (0.25 mg total) by mouth 2 (two) times daily as needed for Anxiety. 20 tablet 0    levothyroxine 88 MCG Oral Tab Take 1 tablet (88 mcg total) by mouth before breakfast. 90 tablet 3      Past Medical History:    Anxiety    Thyroid disease      Social History:  Social History     Socioeconomic History    Marital status:    Tobacco Use    Smoking status: Never    Smokeless tobacco: Never   Vaping Use    Vaping status: Never Used   Substance and Sexual Activity    Alcohol use: Yes     Alcohol/week: 0.0 standard drinks of alcohol     Comment: Socially     Drug use: No   Other Topics Concern    Caffeine Concern Yes     Comment: Coffee, soda - 4 cups per day       Past Surgical History:   Procedure Laterality Date    Knee surgery Bilateral 1990    Faviola biopsy stereo nodule 1 site right (cpt=19081)  2015         EXAM:   There were no vitals  taken for this visit.    System Details   Skin Inspection - Normal.   Constitutional Overall appearance - Normal.   Head/Face Symmetric, TMJ tenderness not present    Eyes EOMI, PERRL   Right ear:  Canal clear, TM intact, no LELAND   Left ear:  Canal clear, TM intact, serous LELAND   Nose: Septum midline, inferior turbinates not enlarged, nasal valves without collapse    Oral cavity/Oropharynx: No lesions or masses on inspection or palpation, tonsils symmetric    Neck: Soft without LAD, thyroid not enlarged  Voice clear/ no stridor   Other:      SCOPES AND PROCEDURES:     Nasal Endoscopy Procedure Note     Due to inability for adequate examination of the nose and nasopharynx and need for magnification to perform the examination, endoscopy was performed.  Risks and benefits were discussed with patient/family and they have given verbal consent to proceed.    Pre-operative Diagnosis:   1. Eustachian tube disorder, unspecified laterality    2. Fluid level behind tympanic membrane of left ear        Post-operative Diagnosis: Same    Procedure: Diagnostic nasal endoscopy    Anesthesia: Topical anesthetic [place]     Surgeon Federico Murphy MD    EBL: 0cc    Procedure Detail & Findings:     After placement of topical anesthetic intranasally the endoscope was inserted into each nares and driven through the nasal cavity into the nasopharynx. The following findings were noted:    Septum: Midline  Inferior turbinates: Normal  Middle meatus: Patent  Middle turbinates: Normal  Purulence: None noted  Polyps: None noted  Nasopharynx and eustachian tube: No masses  Other: The middle and superior meatus, the turbinates, and the spheno-ethmoid recess were inspected and seen to be without significant abnormal findings.     Condition: Stable    Complications: Patient tolerated the procedure well with no immediate complication.    Federico Murphy MD    AUDIOGRAM AND IMAGING:         IMPRESSION:   1. Eustachian tube disorder, unspecified  laterality  - pseudoephedrine  MG Oral Tablet 12 Hr; Take 1 tablet (120 mg total) by mouth every 12 (twelve) hours for 14 days.  Dispense: 28 tablet; Refill: 0    2. Fluid level behind tympanic membrane of left ear       Recommendations:  -Again has a left-sided middle ear effusion with an exacerbation of her chronic eustachian tube dysfunction  -Nasal endoscopy was clear of any obstruction of the eustachian tube  -This has happened about 3 times we had an extensive discussion regarding eustachian tube dysfunction and possible options including temporary myringotomy, and ear tube versus eustachian tube balloon dilation  -He would first like to try a stronger oral steroid in combination with Sudafed and Flonase  -She is going to be flying I advised myringotomy typically would be the best option prior to flying she wants to avoid this if possible I gave her a handout regarding eustachian tube dysfunction and flying  -Will see her back after her trip if still not resolved she may consider myringotomy at that time    The patient indicates understanding of these issues and agrees to the plan.      Federico Murphy MD  10/22/2024  9:31 AM

## 2024-11-15 ENCOUNTER — OFFICE VISIT (OUTPATIENT)
Dept: SURGERY | Facility: CLINIC | Age: 64
End: 2024-11-15
Payer: COMMERCIAL

## 2024-11-15 VITALS
HEART RATE: 68 BPM | DIASTOLIC BLOOD PRESSURE: 72 MMHG | OXYGEN SATURATION: 97 % | WEIGHT: 196 LBS | HEIGHT: 68.3 IN | BODY MASS INDEX: 29.7 KG/M2 | SYSTOLIC BLOOD PRESSURE: 112 MMHG

## 2024-11-15 DIAGNOSIS — E66.9 OBESITY (BMI 30-39.9): ICD-10-CM

## 2024-11-15 DIAGNOSIS — E03.9 HYPOTHYROIDISM, UNSPECIFIED TYPE: ICD-10-CM

## 2024-11-15 DIAGNOSIS — E78.5 DYSLIPIDEMIA: Primary | ICD-10-CM

## 2024-11-15 DIAGNOSIS — F41.9 ANXIETY: ICD-10-CM

## 2024-11-15 DIAGNOSIS — Z51.81 ENCOUNTER FOR THERAPEUTIC DRUG MONITORING: ICD-10-CM

## 2024-11-15 PROCEDURE — 3008F BODY MASS INDEX DOCD: CPT | Performed by: NURSE PRACTITIONER

## 2024-11-15 PROCEDURE — 3074F SYST BP LT 130 MM HG: CPT | Performed by: NURSE PRACTITIONER

## 2024-11-15 PROCEDURE — 99214 OFFICE O/P EST MOD 30 MIN: CPT | Performed by: NURSE PRACTITIONER

## 2024-11-15 PROCEDURE — 3078F DIAST BP <80 MM HG: CPT | Performed by: NURSE PRACTITIONER

## 2024-11-15 RX ORDER — SEMAGLUTIDE 2.4 MG/.75ML
2.4 INJECTION, SOLUTION SUBCUTANEOUS WEEKLY
Qty: 9 ML | Refills: 1 | Status: SHIPPED | OUTPATIENT
Start: 2024-11-15

## 2024-11-15 NOTE — PATIENT INSTRUCTIONS
Add Magnesium glycinate 200 to 500 mg/day for sleep.   Life Extension. NOW. Garden of Life. Whole Food Brand. Mary[employer]uth's. *Pure Encapsulations.     Or consider Natural Calm.   by Natural Vitality  Follow dosage instructions.  Start 1 teaspoon and increase up to 3 teaspoons as needed for sleep.

## 2024-11-15 NOTE — PROGRESS NOTES
[facility], [facility], [place]  [address]  [facility] [address]  Dept: [phone]       Patient:  Parris Paredes  :      1960  MRN:      [id number]    Chief Complaint:    Chief Complaint   Patient presents with    Follow - Up    Weight Management    Obesity       SUBJECTIVE     History of Present Illness:  Parris is being seen today for a follow-up for medically supported weight loss.      Doing well on Wegovy.     Initial HPI:  61 yo female.   Presents to clinic for assistance with weight loss/management.    Patient is considering medications and is not currently a candidate for bariatric surgery for weight loss.    Patient denies any history of eating disorder(s).    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  Patient lives with daughter (25 year old). 3 children total.    Patient's goal weight: 170-180 lbs  Biggest weight loss in the past:    How weight loss was achieved: WW  Heaviest weight ever: Current   Previous use of medical weight loss medications: Stimulant a few months- no significant weight change    Physical activity:  2 days/week; weight training; walks 1 day/week     Sleep: 7-8 hours/night    Sleep screening: planning sleep study       Past Medical History:   Past Medical History:    Anxiety    Thyroid disease        Comorbidities:  Hyperlipidemia-Improvement?  yes    OBJECTIVE     Vitals: /72   Pulse 68   Ht 5' 8.3\" (1.735 m)   Wt 196 lb (88.9 kg)   SpO2 97%   BMI 29.54 kg/m²     Initial weight loss: -03   Total weight loss: -58   Start weight: 254   Weight loss % to date: 22.83%    Wt Readings from Last 6 Encounters:   11/15/24 196 lb (88.9 kg)   24 199 lb (90.3 kg)   24 205 lb (93 kg)   24 207 lb (93.9 kg)   24 207 lb 9.6 oz (94.2 kg)   11/10/23 215 lb (97.5 kg)       Patient Medications:    Current Outpatient Medications   Medication Sig Dispense Refill     semaglutide-weight management (WEGOVY) 2.4 MG/0.75ML Subcutaneous Solution Auto-injector Inject 0.75 mL (2.4 mg total) into the skin once a week. 9 mL 1    predniSONE 20 MG Oral Tab Take 1 tablet (20 mg total) by mouth daily. 5 tablet 0    Meloxicam 15 MG Oral Tab Take 1 tablet (15 mg total) by mouth daily as needed for Pain. 30 tablet 0    ALPRAZolam (XANAX) 0.25 MG Oral Tab Take 1 tablet (0.25 mg total) by mouth 2 (two) times daily as needed for Anxiety. 20 tablet 0    levothyroxine 88 MCG Oral Tab Take 1 tablet (88 mcg total) by mouth before breakfast. 90 tablet 3     Allergies:  Patient has no known allergies.     Social History:  Reviewed    Surgical History:    Past Surgical History:   Procedure Laterality Date    Knee surgery Bilateral     Faviola biopsy stereo nodule 1 site right (cpt=19081)       Family History:    Family History   Problem Relation Age of Onset    Hypertension Father     Heart Disease Father     Other (afib) Father          95     Initial Intake:  Likes eating, likes going out to eat   After dinner behavior: + ishan crackers, 100 calorie bar  Night eating: -  Portion sizes: occasional    Binge: -  Emotional: -  Depression: Score: 1  Grazing: -  Sweet tooth: +  Crunchy/salty: -  Etoh: 3-6 drinks/week; socially  Good water intake, 3 cups coffee/day   Soda Drinker: Yes                 If yes, how much?:  Diet coke every other day [employer]   Sports Drinks:  No                  Juice:  No                     Number of restaurant or fast food meals/week: 2 meals/week  Very limited FF      Food Journal  Reviewed and Discussed:       Patient has a Food Journal?: yes MFP  Patient is reading nutrition labels?  yes  Average Caloric Intake: 1894-1107/day     Average CHO Intake:   Protein goal: 80-90 g/day  Is patient exercising? yes  Type of exercise? Trainer 2 days/week; weights  Less walking    Eating Habits  Patient states the following:  Eats 3 meal(s) per day  Length of time it takes to  consume a meal:    # of snacks per day:    Type of snacks:    Amount of soda consumption per day:  Decreased significantly   Amount of water (in ounces) per day:   Toughest challenge:     B: oatmeal; eggs; kodiak waffle  L: protein shake with produce  D: protein, starch, vegetables    Met with NEYMAR Wilcox in the past     Nutritional Goals  Eat 3-4 cups of fresh fruits or vegetables daily    Behavior Modifications Reviewed and Discussed  Eat breakfast, Eat 3 meals per day, Plan meals in advance, Read nutrition labels, Drink 64 oz of water per day, Maintain a daily food journal, Utlize portion control strategies to reduce calorie intake, Identify triggers for eating and manage cues and Eat slowly and take 20 to 30 minutes to complete each meal    Exercise Goals Reviewed and Discussed:    Increase walking   Aim for 150 minutes moderate level exercise weekly with 2-3 days strength training as tolerated     ROS:    Constitutional: negative  Respiratory: negative  Cardiovascular: positive for lower extremity edema and occasionally   Gastrointestinal: positive for reflux symptoms and with wine intake   Integument/breast: negative  Hematologic/lymphatic: negative  Musculoskeletal:positive for arthralgias and knees   Neurological: positive for headaches  Behavioral/Psych: negative  Endocrine: negative  All other systems were reviewed and are negative    Physical Exam:  General appearance: alert, appears stated age, cooperative and obese  Head: Normocephalic, without obvious abnormality, atraumatic  Neck: no adenopathy, no carotid bruit, no JVD, supple, symmetrical, trachea midline and thyroid not enlarged, symmetric, no tenderness/mass/nodules  Lungs: clear to auscultation bilaterally  Heart: S1, S2 normal, no murmur, click, rub or gallop, regular rate and rhythm  Abdomen: soft, non-tender; bowel sounds normal; no masses,  no organomegaly and abdomen obese   Extremities: intact, no edema   Pulses: 2+ and symmetric  Skin:  intact   Neurologic: Grossly normal      ASSESSMENT     Encounter Diagnosis(ses):   Encounter Diagnoses   Name Primary?    Dyslipidemia Yes    Obesity (BMI 30-39.9)     Encounter for therapeutic drug monitoring     Hypothyroidism, unspecified type     Anxiety        PLAN       Diagnoses and all orders for this visit:    Dyslipidemia    Obesity (BMI 30-39.9)  -     semaglutide-weight management (WEGOVY) 2.4 MG/0.75ML Subcutaneous Solution Auto-injector; Inject 0.75 mL (2.4 mg total) into the skin once a week.    Encounter for therapeutic drug monitoring    Hypothyroidism, unspecified type    Anxiety      DYSLIPIDEMIA: Now in a healthy range. Recommend dietary changes and lifestyle modifications as discussed below. Monitor.       Lab Results   Component Value Date/Time    CHOLEST 182 01/31/2024 12:27 PM    LDL 98 01/31/2024 12:27 PM    HDL 69 (H) 01/31/2024 12:27 PM    TRIG 83 01/31/2024 12:27 PM    VLDL 14 01/31/2024 12:27 PM     OBESITY/WEIGHT GAIN:     Recommended patient continue intensive lifestyle and behavioral modifications at this time for weight loss.     Reviewed lifestyle modifications: Whole Food/Plant Strong/Low Glycemic Index diet, moderate alcohol consumption, reduced sodium intake to no more than 2,400 mg/day, and at least 150 minutes of moderate physical activity per week.   Avoid processed, poor quality carbohydrates, refined grains, flour, sugar.     Goals for next month:  1. Keep a food log.   2. Drink 64 ounces of non-caloric beverages per day. No fruit juices or regular soda.  3. Aim for 150 minutes moderate exercise per week.    4. Increase fruit and vegetable servings to 5-6 per day.    5. Improve sleep and stress.      Reviewed other labs:  12/1/22- Alk phosphatase mildly elevated- monitor.   CMP otherwise in range. Thyroid studies in range.   Elevated WBCs- monitor.  1/31/24- TSH, CMP, CBC in range. Non-fasting.      Discussed medication options for weight loss in detail with patient.       Denies personal or family hx medullary thyroid CA, endocrine neoplasia syndrome, pancreatitis hx, suicidal ideation. No renal impairment, severe GI disease, diabetes, pancreatitis risks noted.     Continue Wegovy 2.4 mg weekly.   Weight loss % to date: 22.83%.  Evening cravings day 5 after injection.   Monitor closely.   *Consider evening topiramate.    SQ administration teaching provided to patient.   Discussed risks, benefits, and side effects of medication.     Met with integrative RD.   Aim for 70 grams protein/day.    Senna tea as needed and psyllium husk daily for constipation.     +insomnia. Start evening magnesium.  Add daily vitamin D and calcium.    Goal: 170-180 lbs.      RTC 4 months.     HEAVENLY Bose

## 2024-12-14 ENCOUNTER — E-VISIT (OUTPATIENT)
Dept: TELEHEALTH | Age: 64
End: 2024-12-14
Payer: COMMERCIAL

## 2024-12-14 DIAGNOSIS — H00.012 HORDEOLUM EXTERNUM OF RIGHT LOWER EYELID: Primary | ICD-10-CM

## 2024-12-14 PROCEDURE — 99422 OL DIG E/M SVC 11-20 MIN: CPT | Performed by: PHYSICIAN ASSISTANT

## 2024-12-14 RX ORDER — ERYTHROMYCIN 5 MG/G
1 OINTMENT OPHTHALMIC EVERY 6 HOURS
Qty: 3.5 G | Refills: 0 | Status: SHIPPED | OUTPATIENT
Start: 2024-12-14 | End: 2024-12-21

## 2024-12-14 NOTE — PATIENT INSTRUCTIONS
-Warm compress  -May call [phone] with questions or concerns.  -The ER is advised with worsening symptoms.  -Please refer to [employer] messages for further care instructions.

## 2024-12-14 NOTE — PROGRESS NOTES
HPI:  Praris Paredes is a 64 year old female who presents for an evisit.  See [employer] communications above.    Current Outpatient Medications   Medication Sig Dispense Refill    erythromycin 5 MG/GM Ophthalmic Ointment Place 1 Application into the right eye every 6 (six) hours for 7 days. 3.5 g 0    semaglutide-weight management (WEGOVY) 2.4 MG/0.75ML Subcutaneous Solution Auto-injector Inject 0.75 mL (2.4 mg total) into the skin once a week. 9 mL 1    predniSONE 20 MG Oral Tab Take 1 tablet (20 mg total) by mouth daily. 5 tablet 0    Meloxicam 15 MG Oral Tab Take 1 tablet (15 mg total) by mouth daily as needed for Pain. 30 tablet 0    ALPRAZolam (XANAX) 0.25 MG Oral Tab Take 1 tablet (0.25 mg total) by mouth 2 (two) times daily as needed for Anxiety. 20 tablet 0    levothyroxine 88 MCG Oral Tab Take 1 tablet (88 mcg total) by mouth before breakfast. 90 tablet 3     Past Medical History:    Anxiety    Thyroid disease     Past Surgical History:   Procedure Laterality Date    Knee surgery Bilateral 1990    Faviola biopsy stereo nodule 1 site right (cpt=19081)  2015       Social History     Socioeconomic History    Marital status:    Tobacco Use    Smoking status: Never    Smokeless tobacco: Never   Vaping Use    Vaping status: Never Used   Substance and Sexual Activity    Alcohol use: Yes     Alcohol/week: 0.0 standard drinks of alcohol     Comment: Socially     Drug use: No   Other Topics Concern    Caffeine Concern Yes     Comment: Coffee, soda - 4 cups per day           No results found for this or any previous visit (from the past 24 hours).    ASSESSMENT AND PLAN:      ASSESSMENT:   Encounter Diagnosis   Name Primary?    Hordeolum externum of right lower eyelid Yes       PLAN: Meds as below.  See patient Instructions  -Total of 13 minutes spent with patient.    Meds & Refills for this Visit:  Requested Prescriptions     Signed Prescriptions Disp Refills    erythromycin 5 MG/GM Ophthalmic Ointment 3.5 g 0      Sig: Place 1 Application into the right eye every 6 (six) hours for 7 days.       Risks, benefits, and side effects of medication explained and discussed.    Patient Instructions   -Warm compress  -May call [phone] with questions or concerns.  -The ER is advised with worsening symptoms.  -Please refer to "[employer]" messages for further care instructions.       The patient indicates understanding of these issues and agrees to the plan.  See attached patient references.  The patient is asked to return if sx's persist or worsen.    Parris Paredes understands evisit evaluation is not a substitute for face-to-face examination or emergency care. Patient advised to go to ER or call 911 for worsening symptoms or acute distress.

## 2024-12-28 ENCOUNTER — APPOINTMENT (OUTPATIENT)
Dept: CT IMAGING | Facility: HOSPITAL | Age: 64
End: 2024-12-28
Attending: EMERGENCY MEDICINE
Payer: COMMERCIAL

## 2024-12-28 ENCOUNTER — HOSPITAL ENCOUNTER (OUTPATIENT)
Age: 64
Discharge: ANOTHER HOSPITAL | DRG: 419 | End: 2024-12-28
Payer: COMMERCIAL

## 2024-12-28 ENCOUNTER — HOSPITAL ENCOUNTER (INPATIENT)
Facility: HOSPITAL | Age: 64
LOS: 1 days | Discharge: HOME | DRG: 419 | End: 2024-12-28
Attending: EMERGENCY MEDICINE | Admitting: INTERNAL MEDICINE
Payer: COMMERCIAL

## 2024-12-28 ENCOUNTER — APPOINTMENT (OUTPATIENT)
Dept: CT IMAGING | Facility: HOSPITAL | Age: 64
DRG: 419 | End: 2024-12-28
Attending: EMERGENCY MEDICINE
Payer: COMMERCIAL

## 2024-12-28 ENCOUNTER — ANESTHESIA EVENT (OUTPATIENT)
Dept: SURGERY | Facility: HOSPITAL | Age: 64
End: 2024-12-28
Payer: COMMERCIAL

## 2024-12-28 ENCOUNTER — HOSPITAL ENCOUNTER (INPATIENT)
Facility: HOSPITAL | Age: 64
LOS: 1 days | Discharge: HOME | End: 2024-12-28
Attending: EMERGENCY MEDICINE | Admitting: INTERNAL MEDICINE
Payer: COMMERCIAL

## 2024-12-28 ENCOUNTER — HOSPITAL ENCOUNTER (OUTPATIENT)
Age: 64
Discharge: ANOTHER HOSPITAL | End: 2024-12-28
Payer: COMMERCIAL

## 2024-12-28 ENCOUNTER — ANESTHESIA (OUTPATIENT)
Dept: SURGERY | Facility: HOSPITAL | Age: 64
End: 2024-12-28
Payer: COMMERCIAL

## 2024-12-28 VITALS
SYSTOLIC BLOOD PRESSURE: 120 MMHG | HEART RATE: 79 BPM | OXYGEN SATURATION: 99 % | RESPIRATION RATE: 20 BRPM | DIASTOLIC BLOOD PRESSURE: 83 MMHG | TEMPERATURE: 98 F

## 2024-12-28 VITALS
DIASTOLIC BLOOD PRESSURE: 79 MMHG | OXYGEN SATURATION: 97 % | RESPIRATION RATE: 16 BRPM | HEART RATE: 69 BPM | SYSTOLIC BLOOD PRESSURE: 140 MMHG | HEIGHT: 69 IN | TEMPERATURE: 98 F | WEIGHT: 195 LBS | BODY MASS INDEX: 28.88 KG/M2

## 2024-12-28 DIAGNOSIS — R10.11 RUQ PAIN: Primary | ICD-10-CM

## 2024-12-28 DIAGNOSIS — K81.0 ACUTE CHOLECYSTITIS: Primary | ICD-10-CM

## 2024-12-28 LAB
ALBUMIN SERPL-MCNC: 4.3 G/DL (ref 3.2–4.8)
ALP LIVER SERPL-CCNC: 94 U/L
ALT SERPL-CCNC: 10 U/L
ANION GAP SERPL CALC-SCNC: 8 MMOL/L (ref 0–18)
AST SERPL-CCNC: 17 U/L (ref ?–34)
BASOPHILS # BLD AUTO: 0.05 X10(3) UL (ref 0–0.2)
BASOPHILS NFR BLD AUTO: 0.5 %
BILIRUB DIRECT SERPL-MCNC: 0.2 MG/DL (ref ?–0.3)
BILIRUB SERPL-MCNC: 0.8 MG/DL (ref 0.2–1.1)
BUN BLD-MCNC: 9 MG/DL (ref 9–23)
BUN/CREAT SERPL: 11.1 (ref 10–20)
CALCIUM BLD-MCNC: 9.4 MG/DL (ref 8.7–10.4)
CHLORIDE SERPL-SCNC: 108 MMOL/L (ref 98–112)
CO2 SERPL-SCNC: 23 MMOL/L (ref 21–32)
CREAT BLD-MCNC: 0.81 MG/DL
DEPRECATED RDW RBC AUTO: 43.4 FL (ref 35.1–46.3)
EGFRCR SERPLBLD CKD-EPI 2021: 82 ML/MIN/1.73M2 (ref 60–?)
EOSINOPHIL # BLD AUTO: 0.27 X10(3) UL (ref 0–0.7)
EOSINOPHIL NFR BLD AUTO: 2.5 %
ERYTHROCYTE [DISTWIDTH] IN BLOOD BY AUTOMATED COUNT: 13 % (ref 11–15)
GLUCOSE BLD-MCNC: 99 MG/DL (ref 70–99)
HCT VFR BLD AUTO: 42.1 %
HGB BLD-MCNC: 14.1 G/DL
IMM GRANULOCYTES # BLD AUTO: 0.02 X10(3) UL (ref 0–1)
IMM GRANULOCYTES NFR BLD: 0.2 %
LIPASE SERPL-CCNC: 30 U/L (ref 12–53)
LYMPHOCYTES # BLD AUTO: 1.7 X10(3) UL (ref 1–4)
LYMPHOCYTES NFR BLD AUTO: 15.9 %
MCH RBC QN AUTO: 30.6 PG (ref 26–34)
MCHC RBC AUTO-ENTMCNC: 33.5 G/DL (ref 31–37)
MCV RBC AUTO: 91.3 FL
MONOCYTES # BLD AUTO: 0.75 X10(3) UL (ref 0.1–1)
MONOCYTES NFR BLD AUTO: 7 %
NEUTROPHILS # BLD AUTO: 7.92 X10 (3) UL (ref 1.5–7.7)
NEUTROPHILS # BLD AUTO: 7.92 X10(3) UL (ref 1.5–7.7)
NEUTROPHILS NFR BLD AUTO: 73.9 %
OSMOLALITY SERPL CALC.SUM OF ELEC: 287 MOSM/KG (ref 275–295)
PLATELET # BLD AUTO: 287 10(3)UL (ref 150–450)
POTASSIUM SERPL-SCNC: 4 MMOL/L (ref 3.5–5.1)
PROT SERPL-MCNC: 7.1 G/DL (ref 5.7–8.2)
RBC # BLD AUTO: 4.61 X10(6)UL
SODIUM SERPL-SCNC: 139 MMOL/L (ref 136–145)
WBC # BLD AUTO: 10.7 X10(3) UL (ref 4–11)

## 2024-12-28 PROCEDURE — 99236 HOSP IP/OBS SAME DATE HI 85: CPT | Performed by: INTERNAL MEDICINE

## 2024-12-28 PROCEDURE — 47562 LAPAROSCOPIC CHOLECYSTECTOMY: CPT | Performed by: STUDENT IN AN ORGANIZED HEALTH CARE EDUCATION/TRAINING PROGRAM

## 2024-12-28 PROCEDURE — 0FT44ZZ RESECTION OF GALLBLADDER, PERCUTANEOUS ENDOSCOPIC APPROACH: ICD-10-PCS | Performed by: STUDENT IN AN ORGANIZED HEALTH CARE EDUCATION/TRAINING PROGRAM

## 2024-12-28 PROCEDURE — 99215 OFFICE O/P EST HI 40 MIN: CPT | Performed by: PHYSICIAN ASSISTANT

## 2024-12-28 PROCEDURE — 74177 CT ABD & PELVIS W/CONTRAST: CPT | Performed by: EMERGENCY MEDICINE

## 2024-12-28 RX ORDER — METRONIDAZOLE 500 MG/100ML
500 INJECTION, SOLUTION INTRAVENOUS ONCE
Status: COMPLETED | OUTPATIENT
Start: 2024-12-28 | End: 2024-12-28

## 2024-12-28 RX ORDER — LEVOTHYROXINE SODIUM 88 UG/1
88 TABLET ORAL
Status: DISCONTINUED | OUTPATIENT
Start: 2024-12-28 | End: 2024-12-28

## 2024-12-28 RX ORDER — MORPHINE SULFATE 4 MG/ML
2 INJECTION, SOLUTION INTRAMUSCULAR; INTRAVENOUS EVERY 10 MIN PRN
Status: DISCONTINUED | OUTPATIENT
Start: 2024-12-28 | End: 2024-12-28 | Stop reason: HOSPADM

## 2024-12-28 RX ORDER — SODIUM CHLORIDE, SODIUM LACTATE, POTASSIUM CHLORIDE, CALCIUM CHLORIDE 600; 310; 30; 20 MG/100ML; MG/100ML; MG/100ML; MG/100ML
INJECTION, SOLUTION INTRAVENOUS CONTINUOUS
Status: DISCONTINUED | OUTPATIENT
Start: 2024-12-28 | End: 2024-12-28 | Stop reason: HOSPADM

## 2024-12-28 RX ORDER — SENNOSIDES 8.6 MG
17.2 TABLET ORAL NIGHTLY PRN
Status: DISCONTINUED | OUTPATIENT
Start: 2024-12-28 | End: 2024-12-28

## 2024-12-28 RX ORDER — HYDROCODONE BITARTRATE AND ACETAMINOPHEN 5; 325 MG/1; MG/1
2 TABLET ORAL EVERY 4 HOURS PRN
Status: DISCONTINUED | OUTPATIENT
Start: 2024-12-28 | End: 2024-12-28

## 2024-12-28 RX ORDER — HYDROMORPHONE HYDROCHLORIDE 1 MG/ML
0.6 INJECTION, SOLUTION INTRAMUSCULAR; INTRAVENOUS; SUBCUTANEOUS EVERY 5 MIN PRN
Status: DISCONTINUED | OUTPATIENT
Start: 2024-12-28 | End: 2024-12-28 | Stop reason: HOSPADM

## 2024-12-28 RX ORDER — ACETAMINOPHEN 500 MG
1000 TABLET ORAL EVERY 8 HOURS
Status: DISCONTINUED | OUTPATIENT
Start: 2024-12-28 | End: 2024-12-28

## 2024-12-28 RX ORDER — OMEGA-3 FATTY ACIDS/FISH OIL 300-1000MG
400 CAPSULE ORAL EVERY 6 HOURS PRN
Qty: 120 CAPSULE | Refills: 0 | Status: SHIPPED | OUTPATIENT
Start: 2024-12-28 | End: 2025-01-27

## 2024-12-28 RX ORDER — ONDANSETRON 2 MG/ML
4 INJECTION INTRAMUSCULAR; INTRAVENOUS EVERY 6 HOURS PRN
Status: DISCONTINUED | OUTPATIENT
Start: 2024-12-28 | End: 2024-12-28

## 2024-12-28 RX ORDER — SODIUM PHOSPHATE, DIBASIC AND SODIUM PHOSPHATE, MONOBASIC 7; 19 G/230ML; G/230ML
1 ENEMA RECTAL ONCE AS NEEDED
Status: DISCONTINUED | OUTPATIENT
Start: 2024-12-28 | End: 2024-12-28

## 2024-12-28 RX ORDER — MORPHINE SULFATE 2 MG/ML
2 INJECTION, SOLUTION INTRAMUSCULAR; INTRAVENOUS EVERY 2 HOUR PRN
Status: DISCONTINUED | OUTPATIENT
Start: 2024-12-28 | End: 2024-12-28

## 2024-12-28 RX ORDER — DEXAMETHASONE SODIUM PHOSPHATE 4 MG/ML
VIAL (ML) INJECTION AS NEEDED
Status: DISCONTINUED | OUTPATIENT
Start: 2024-12-28 | End: 2024-12-28 | Stop reason: SURG

## 2024-12-28 RX ORDER — BISACODYL 10 MG
10 SUPPOSITORY, RECTAL RECTAL
Status: DISCONTINUED | OUTPATIENT
Start: 2024-12-28 | End: 2024-12-28

## 2024-12-28 RX ORDER — ROCURONIUM BROMIDE 10 MG/ML
INJECTION, SOLUTION INTRAVENOUS AS NEEDED
Status: DISCONTINUED | OUTPATIENT
Start: 2024-12-28 | End: 2024-12-28 | Stop reason: SURG

## 2024-12-28 RX ORDER — HYDROCODONE BITARTRATE AND ACETAMINOPHEN 5; 325 MG/1; MG/1
1 TABLET ORAL EVERY 4 HOURS PRN
Status: DISCONTINUED | OUTPATIENT
Start: 2024-12-28 | End: 2024-12-28

## 2024-12-28 RX ORDER — MIDAZOLAM HYDROCHLORIDE 1 MG/ML
INJECTION INTRAMUSCULAR; INTRAVENOUS AS NEEDED
Status: DISCONTINUED | OUTPATIENT
Start: 2024-12-28 | End: 2024-12-28 | Stop reason: SURG

## 2024-12-28 RX ORDER — HYDROMORPHONE HYDROCHLORIDE 1 MG/ML
0.2 INJECTION, SOLUTION INTRAMUSCULAR; INTRAVENOUS; SUBCUTANEOUS EVERY 5 MIN PRN
Status: DISCONTINUED | OUTPATIENT
Start: 2024-12-28 | End: 2024-12-28 | Stop reason: HOSPADM

## 2024-12-28 RX ORDER — ALPRAZOLAM 0.25 MG/1
0.25 TABLET ORAL 2 TIMES DAILY PRN
Status: DISCONTINUED | OUTPATIENT
Start: 2024-12-28 | End: 2024-12-28

## 2024-12-28 RX ORDER — BUPIVACAINE HYDROCHLORIDE 2.5 MG/ML
INJECTION, SOLUTION EPIDURAL; INFILTRATION; INTRACAUDAL AS NEEDED
Status: DISCONTINUED | OUTPATIENT
Start: 2024-12-28 | End: 2024-12-28 | Stop reason: HOSPADM

## 2024-12-28 RX ORDER — NALOXONE HYDROCHLORIDE 0.4 MG/ML
0.08 INJECTION, SOLUTION INTRAMUSCULAR; INTRAVENOUS; SUBCUTANEOUS AS NEEDED
Status: DISCONTINUED | OUTPATIENT
Start: 2024-12-28 | End: 2024-12-28 | Stop reason: HOSPADM

## 2024-12-28 RX ORDER — GLYCOPYRROLATE 0.2 MG/ML
INJECTION, SOLUTION INTRAMUSCULAR; INTRAVENOUS AS NEEDED
Status: DISCONTINUED | OUTPATIENT
Start: 2024-12-28 | End: 2024-12-28 | Stop reason: SURG

## 2024-12-28 RX ORDER — PROCHLORPERAZINE EDISYLATE 5 MG/ML
5 INJECTION INTRAMUSCULAR; INTRAVENOUS EVERY 8 HOURS PRN
Status: DISCONTINUED | OUTPATIENT
Start: 2024-12-28 | End: 2024-12-28

## 2024-12-28 RX ORDER — MORPHINE SULFATE 4 MG/ML
4 INJECTION, SOLUTION INTRAMUSCULAR; INTRAVENOUS EVERY 10 MIN PRN
Status: DISCONTINUED | OUTPATIENT
Start: 2024-12-28 | End: 2024-12-28 | Stop reason: HOSPADM

## 2024-12-28 RX ORDER — HEPARIN SODIUM 5000 [USP'U]/ML
5000 INJECTION, SOLUTION INTRAVENOUS; SUBCUTANEOUS EVERY 8 HOURS SCHEDULED
Status: DISCONTINUED | OUTPATIENT
Start: 2024-12-28 | End: 2024-12-28

## 2024-12-28 RX ORDER — ONDANSETRON 2 MG/ML
INJECTION INTRAMUSCULAR; INTRAVENOUS AS NEEDED
Status: DISCONTINUED | OUTPATIENT
Start: 2024-12-28 | End: 2024-12-28 | Stop reason: SURG

## 2024-12-28 RX ORDER — LIDOCAINE HYDROCHLORIDE 10 MG/ML
INJECTION, SOLUTION EPIDURAL; INFILTRATION; INTRACAUDAL; PERINEURAL AS NEEDED
Status: DISCONTINUED | OUTPATIENT
Start: 2024-12-28 | End: 2024-12-28 | Stop reason: SURG

## 2024-12-28 RX ORDER — ACETAMINOPHEN 500 MG
1000 TABLET ORAL EVERY 4 HOURS PRN
Qty: 30 TABLET | Refills: 0 | Status: SHIPPED | OUTPATIENT
Start: 2024-12-28

## 2024-12-28 RX ORDER — SODIUM CHLORIDE, SODIUM LACTATE, POTASSIUM CHLORIDE, CALCIUM CHLORIDE 600; 310; 30; 20 MG/100ML; MG/100ML; MG/100ML; MG/100ML
INJECTION, SOLUTION INTRAVENOUS CONTINUOUS PRN
Status: DISCONTINUED | OUTPATIENT
Start: 2024-12-28 | End: 2024-12-28 | Stop reason: SURG

## 2024-12-28 RX ORDER — PHENYLEPHRINE HCL 10 MG/ML
VIAL (ML) INJECTION AS NEEDED
Status: DISCONTINUED | OUTPATIENT
Start: 2024-12-28 | End: 2024-12-28 | Stop reason: SURG

## 2024-12-28 RX ORDER — POLYETHYLENE GLYCOL 3350 17 G/17G
17 POWDER, FOR SOLUTION ORAL DAILY PRN
Status: DISCONTINUED | OUTPATIENT
Start: 2024-12-28 | End: 2024-12-28

## 2024-12-28 RX ORDER — ACETAMINOPHEN 500 MG
500 TABLET ORAL EVERY 4 HOURS PRN
Status: DISCONTINUED | OUTPATIENT
Start: 2024-12-28 | End: 2024-12-28

## 2024-12-28 RX ORDER — KETOROLAC TROMETHAMINE 30 MG/ML
30 INJECTION, SOLUTION INTRAMUSCULAR; INTRAVENOUS ONCE
Status: COMPLETED | OUTPATIENT
Start: 2024-12-28 | End: 2024-12-28

## 2024-12-28 RX ORDER — ACETAMINOPHEN 325 MG/1
650 TABLET ORAL EVERY 4 HOURS PRN
Status: DISCONTINUED | OUTPATIENT
Start: 2024-12-28 | End: 2024-12-28

## 2024-12-28 RX ORDER — SODIUM CHLORIDE 9 MG/ML
INJECTION, SOLUTION INTRAVENOUS CONTINUOUS
Status: DISCONTINUED | OUTPATIENT
Start: 2024-12-28 | End: 2024-12-28

## 2024-12-28 RX ORDER — MORPHINE SULFATE 2 MG/ML
1 INJECTION, SOLUTION INTRAMUSCULAR; INTRAVENOUS EVERY 2 HOUR PRN
Status: DISCONTINUED | OUTPATIENT
Start: 2024-12-28 | End: 2024-12-28

## 2024-12-28 RX ORDER — OXYCODONE HYDROCHLORIDE 5 MG/1
5 TABLET ORAL EVERY 4 HOURS PRN
Qty: 5 TABLET | Refills: 0 | Status: SHIPPED | OUTPATIENT
Start: 2024-12-28

## 2024-12-28 RX ORDER — MORPHINE SULFATE 10 MG/ML
6 INJECTION, SOLUTION INTRAMUSCULAR; INTRAVENOUS EVERY 10 MIN PRN
Status: DISCONTINUED | OUTPATIENT
Start: 2024-12-28 | End: 2024-12-28 | Stop reason: HOSPADM

## 2024-12-28 RX ORDER — METRONIDAZOLE 500 MG/100ML
500 INJECTION, SOLUTION INTRAVENOUS EVERY 12 HOURS
Status: DISCONTINUED | OUTPATIENT
Start: 2024-12-29 | End: 2024-12-28

## 2024-12-28 RX ORDER — HYDROMORPHONE HYDROCHLORIDE 1 MG/ML
0.4 INJECTION, SOLUTION INTRAMUSCULAR; INTRAVENOUS; SUBCUTANEOUS EVERY 5 MIN PRN
Status: DISCONTINUED | OUTPATIENT
Start: 2024-12-28 | End: 2024-12-28 | Stop reason: HOSPADM

## 2024-12-28 RX ORDER — OXYCODONE HYDROCHLORIDE 5 MG/1
5 TABLET ORAL EVERY 4 HOURS PRN
Status: DISCONTINUED | OUTPATIENT
Start: 2024-12-28 | End: 2024-12-28

## 2024-12-28 RX ORDER — MORPHINE SULFATE 4 MG/ML
4 INJECTION, SOLUTION INTRAMUSCULAR; INTRAVENOUS EVERY 2 HOUR PRN
Status: DISCONTINUED | OUTPATIENT
Start: 2024-12-28 | End: 2024-12-28

## 2024-12-28 RX ADMIN — PHENYLEPHRINE HCL 100 MCG: 10 MG/ML VIAL (ML) INJECTION at 14:39:00

## 2024-12-28 RX ADMIN — MIDAZOLAM HYDROCHLORIDE 2 MG: 1 INJECTION INTRAMUSCULAR; INTRAVENOUS at 14:18:00

## 2024-12-28 RX ADMIN — SODIUM CHLORIDE, SODIUM LACTATE, POTASSIUM CHLORIDE, CALCIUM CHLORIDE: 600; 310; 30; 20 INJECTION, SOLUTION INTRAVENOUS at 14:17:00

## 2024-12-28 RX ADMIN — GLYCOPYRROLATE 0.2 MG: 0.2 INJECTION, SOLUTION INTRAMUSCULAR; INTRAVENOUS at 14:24:00

## 2024-12-28 RX ADMIN — ROCURONIUM BROMIDE 100 MG: 10 INJECTION, SOLUTION INTRAVENOUS at 14:19:00

## 2024-12-28 RX ADMIN — DEXAMETHASONE SODIUM PHOSPHATE 4 MG: 4 MG/ML VIAL (ML) INJECTION at 14:24:00

## 2024-12-28 RX ADMIN — LIDOCAINE HYDROCHLORIDE 50 MG: 10 INJECTION, SOLUTION EPIDURAL; INFILTRATION; INTRACAUDAL; PERINEURAL at 14:19:00

## 2024-12-28 RX ADMIN — PHENYLEPHRINE HCL 100 MCG: 10 MG/ML VIAL (ML) INJECTION at 14:37:00

## 2024-12-28 RX ADMIN — ONDANSETRON 4 MG: 2 INJECTION INTRAMUSCULAR; INTRAVENOUS at 15:00:00

## 2024-12-28 NOTE — ED QUICK NOTES
Orders for admission, patient is aware of plan and ready to go upstairs. Any questions, please call ED RN justice  at extension [phone].   Chief Complaint   Patient presents with    Abdomen/Flank Pain    Diarrhea       Patient AO x 3  Ambulation: ambulatory  Belongings: accompanying patient  Medications: see mar   IV: 20g rac  Language: egnlish  COVID-19 suspicion level/status: na  CIWA SCORE: na  NIH: na  Other pertinent information:  na

## 2024-12-28 NOTE — ANESTHESIA POSTPROCEDURE EVALUATION
Patient: Parris Paredes    Procedure Summary       Date: 12/28/24 Room / Location: [facility] MAIN OR  / [facility] MAIN OR    Anesthesia Start: 1416 Anesthesia Stop: 1524    Procedure: LAPAROSCOPIC CHOLECYSTECTOMY (Abdomen) Diagnosis:       Acute cholecystitis      (Acute cholecystitis [K81.0])    Surgeons: Deepali Mayo MD Anesthesiologist: Dl Mederos MD    Anesthesia Type: general ASA Status: 1            Anesthesia Type: general    Vitals Value Taken Time   /59 12/28/24 1525   Temp 97.7 °F (36.5 °C) 12/28/24 1525   Pulse 77 12/28/24 1535   Resp 14 12/28/24 1535   SpO2 96 % 12/28/24 1535       [facility] AN Post Evaluation:   Patient Evaluated in PACU  Patient Participation: complete - patient participated  Level of Consciousness: awake and alert  Pain Score: 0  Pain Management: adequate  Airway Patency:patent  Dental exam unchanged from preop  Yes    Nausea/Vomiting: none  Cardiovascular Status: acceptable  Respiratory Status: acceptable  Postoperative Hydration acceptable      Dl Mederos MD  12/28/2024 3:35 PM

## 2024-12-28 NOTE — ED PROVIDER NOTES
Patient Seen in: [facility] Emergency Department    History     Chief Complaint   Patient presents with    Abdomen/Flank Pain    Diarrhea       HPI    History is provided by patient/independent historian: patient  64 year old female with hx of anxiety, thyroid disease, here with c/o RUQ Pain for the last 2 days. No vomiting/diarrhea. No fever. No cough/cold symptoms.     History reviewed.   Past Medical History:    Anxiety    Thyroid disease         History reviewed.   Past Surgical History:   Procedure Laterality Date    Knee surgery Bilateral 1990    Faviola biopsy stereo nodule 1 site right (cpt=19081)  2015         Home Medications reviewed :  Prescriptions Prior to Admission[1]      History reviewed.   Social History     Socioeconomic History    Marital status:    Tobacco Use    Smoking status: Never    Smokeless tobacco: Never   Vaping Use    Vaping status: Never Used   Substance and Sexual Activity    Alcohol use: Yes     Alcohol/week: 0.0 standard drinks of alcohol     Comment: Socially     Drug use: No   Other Topics Concern    Caffeine Concern Yes     Comment: Coffee, soda - 4 cups per day          ROS  Review of Systems   Respiratory:  Negative for shortness of breath.    Cardiovascular:  Negative for chest pain.   Gastrointestinal:  Positive for abdominal pain.   Neurological:  Positive for headaches.   All other systems reviewed and are negative.     All other pertinent organ systems are reviewed and are negative.      Physical Exam     ED Triage Vitals [12/28/24 0858]   /73   Pulse 74   Resp 18   Temp 98 °F (36.7 °C)   Temp src    SpO2 98 %   O2 Device None (Room air)     Vital signs reviewed.      Physical Exam  Vitals and nursing note reviewed.   Cardiovascular:      Pulses: Normal pulses.   Pulmonary:      Effort: No respiratory distress.   Abdominal:      General: There is no distension.      Tenderness: There is abdominal tenderness in the right upper quadrant.   Neurological:       Mental Status: She is alert.         ED Course       Labs:     Labs Reviewed   CBC WITH DIFFERENTIAL WITH PLATELET - Abnormal; Notable for the following components:       Result Value    Neutrophil Absolute Prelim 7.92 (*)     Neutrophil Absolute 7.92 (*)     All other components within normal limits   HEPATIC FUNCTION PANEL (7) - Normal   BASIC METABOLIC PANEL (8) - Normal   LIPASE - Normal   URINALYSIS WITH CULTURE REFLEX         My EKG Interpretation:   As reviewed and Interpreted by me      Imaging Results Available and Reviewed while in ED:   CT ABDOMEN+PELVIS(CONTRAST ONLY)(CPT=74177)    Result Date: 12/28/2024  CONCLUSION:  1.  Prominent gallbladder distention with gallbladder wall thickening.  Correlate for cholecystitis.  No radiodense biliary stones or biliary ductal dilatation. 2.  Colonic diverticulosis.    Dictated by (CST): Shawn Resendiz MD on 12/28/2024 at 11:14 AM     Finalized by (CST): Shawn Resendiz MD on 12/28/2024 at 11:16 AM         My review and independent interpretation of CT images: cholecystitis. Radiology report corroborates this in addition to other details as reported by them.      Decision rules/scores evaluated: none      Diagnostic labs/tests considered but not ordered: none    ED Medications Administered:   Medications   cefTRIAXone (Rocephin) 1 g in sodium chloride 0.9% 100 mL IVPB-ADDV (has no administration in time range)   metroNIDAZOLE in sodium chloride 0.79% (Flagyl) 5 mg/mL IVPB premix 500 mg (has no administration in time range)   ketorolac (Toradol) 30 MG/ML injection 30 mg (30 mg Intravenous Given 12/28/24 0956)   sodium chloride 0.9 % IV bolus 1,000 mL (1,000 mL Intravenous New Bag 12/28/24 0956)   iopamidol 76% (ISOVUE-370) injection for power injector (80 mL Intravenous Given 12/28/24 1053)                MDM       Medical Decision Making      Differential Diagnosis: After obtaining the patient's history, performing the physical exam and reviewing the diagnostics,  multiple initial diagnoses were considered based on the presenting problem including nephrolithiasis, pyelonephritis, cholelithiasis, cholecystitis    External document review: I personally reviewed available external medical records for any recent pertinent discharge summaries, testing, and procedures - the findings are as follows: 11/15/24 visit with HEAVENLY Paredes for f/u weight management    Complicating Factors: The patient already  has a past medical history of Anxiety and Thyroid disease. to contribute to the complexity of this ED evaluation.    Procedures performed: none    Discussed management with physician/appropriate source: Dr. Isaac, Dr. Mayo    Considered admission/deescalation of care for: none    Social determinants of health affecting patient care: none    Prescription medications considered: discussed continuing current medication regimen    The patient requires continuous monitoring for: abdominal pain    Shared decision making: discussed possible admission        Disposition and Plan     Clinical Impression:  1. Acute cholecystitis        Disposition:  Admit    Follow-up:  No follow-up provider specified.    Medications Prescribed:  Current Discharge Medication List          Hospital Problems       Present on Admission  Date Reviewed: 12/14/2024            ICD-10-CM Noted POA    * (Principal) Acute cholecystitis K81.0 12/28/2024 Unknown                     [1] (Not in a hospital admission)

## 2024-12-28 NOTE — ANESTHESIA PROCEDURE NOTES
Airway  Date/Time: 12/28/2024 2:21 PM  Urgency: Elective      General Information and Staff    Patient location during procedure: OR  Anesthesiologist: Stu Webb MD  Performed: anesthesiologist   Performed by: Stu Webb MD  Authorized by: Stu Webb MD      Indications and Patient Condition  Indications for airway management: anesthesia  Sedation level: deep  Preoxygenated: yes  Patient position: reverse trendelenburg  Mask difficulty assessment: 1 - vent by mask    Final Airway Details  Final airway type: endotracheal airway      Successful airway: ETT  Cuffed: yes   Successful intubation technique: direct laryngoscopy  Facilitating devices/methods: rapid sequence intubation  Endotracheal tube insertion site: oral  Blade: Brianne  Blade size: #3  ETT size (mm): 7.0    Cormack-Lehane Classification: grade IIA - partial view of glottis  Placement verified by: capnometry   Measured from: teeth  ETT to teeth (cm): 21  Number of attempts at approach: 1

## 2024-12-28 NOTE — OPERATIVE REPORT
OPERATIVE NOTE    PATIENT NAME: Parris Paredes    :  1960    MRN:  [id number]  ATTENDING PHYSICIAN:  Rafael Betts MD    PROCEDURE DATE:  2024       PREOPERATIVE DIAGNOSIS: Acute cholecystitis     POSTOPERATIVE DIAGNOSIS: Same, plus chronic cholecystitis     SURGEON: Deepali Mayo MD    ASSISTANT: DANIELE Clement     OPERATION: Laparoscopic cholecystectomy    ANESTHESIA: General    ESTIMATED BLOOD LOSS:  5 ml      COMPLICATIONS: none     SPECIMENS: Was Obtained: Gallbladder    INDICATIONS: The patient is a 64 year old year old female with history of above preop diagnosis.  I explained the risk, benefits, expected outcome, and alternatives to the procedure. Patient understands the risks include but not inclusive to bleeding, infection, bile leak, bile duct injury, diarrhea, chronic pain, and persistent symptoms.  She indicates understanding and wishes to proceed with surgery.     DESCRIPTION OF PROCEDURE:    The patient was brought to the operating room and placed in supine position.  After initiation of general anesthesia by the Anesthesia Department, the abdomen was prepped and draped normal sterile fashion.  A Veress needle was placed in the left upper quadrant and the abdomen was insufflated without difficulty to a pressure of 15 mmhg.  We placed three 5 mm trocars, one in the left upper quadrant, 2 in the right upper quadrant, and 11 mm trocar in the supraumbilical position.    Upon entering the abdomen, the gallbladder was identified, grasped, and retracted cephalad.  The peritoneum overlying the junction of the cystic duct was stripped free.  Hook electrocautery was used to detach the lateral peritoneal attachments and the left and right side of the gallbladder up towards the fundus.  We isolated the cystic duct and cystic artery and dissected the posterior aspect of the gallbladder from the gallbladder fossa.  There were only 2 structures noted, the cystic duct and the cystic artery.   The critical view of safety was obtained. This was photodocumented. We placed 2 Hemolock clips on the stay side of the cystic duct and 1 clip on the gallbladder side and the cystic duct was divided.  We placed 1 clips on the stay side of the cystic artery, 1 clip on the gallbladder side, and the cystic artery was divided.  Hook electrocautery was used to remove the gallbladder from the gallbladder fossa.  Prior to amputating the gallbladder, we again re-evaluated our clip structures, there was no evidence of bleeding or bilious extravasation.  The gallbladder was amputated and removed using a wound protection device.  Multiple 0 Vicryl sutures were used to close the fascial defect at the gallbladder extraction site. Local anesthetic was injected into the TAP plane along each trocar site and the skin incision were also anesthetized. The skin was closed using 4-0 Monocryl suture.  All instrument and sponge counts were correct at the end of the case. I was present and scrubbed for the entire operation. The patient tolerated the procedure well, was extubated, and sent to the recovery room in stable condition.           Deepali Mayo MD  [facility] - General Surgery   [address] [place] [address]  p [phone]

## 2024-12-28 NOTE — CONSULTS
[facility]  part of [facility]    Report of Consultation    Parris Paredes Patient Status:  Inpatient    1960 MRN [id number]   Location [facility] OPERATING ROOM Attending Rafael Betts MD   Hosp Day # 0 PCP Mihaela Nails MD     Date of Admission:  2024  Date of Consult:  2024  Reason for Consultation:   Acute cholecystitis    History of Present Illness:   Patient is a 64 year old female who was admitted to the hospital for Acute cholecystitis:    The patient reports 2 days of RUQ abdominal pain, mild nausea w/o emesis. No fevers/chills.     Past Medical History  Past Medical History:    Anxiety    Thyroid disease       Past Surgical History  Past Surgical History:   Procedure Laterality Date    Knee surgery Bilateral     Faviola biopsy stereo nodule 1 site right (cpt=19081)         Family History  Family History   Problem Relation Age of Onset    Hypertension Father     Heart Disease Father     Other (afib) Father          95       Social History  Social History     Socioeconomic History    Marital status:    Tobacco Use    Smoking status: Never    Smokeless tobacco: Never   Vaping Use    Vaping status: Never Used   Substance and Sexual Activity    Alcohol use: Yes     Alcohol/week: 0.0 standard drinks of alcohol     Comment: Socially     Drug use: No   Other Topics Concern    Caffeine Concern Yes     Comment: Coffee, soda - 4 cups per day      Social Drivers of Health     Food Insecurity: No Food Insecurity (2024)    Food Insecurity     Food Insecurity: Never true   Transportation Needs: No Transportation Needs (2024)    Transportation Needs     Lack of Transportation: No   Housing Stability: Low Risk  (2024)    Housing Stability     Housing Instability: No           Current Medications:  Current Facility-Administered Medications   Medication Dose Route Frequency    metroNIDAZOLE in sodium chloride 0.79% (Flagyl) 5 mg/mL  IVPB premix 500 mg  500 mg Intravenous Once    ALPRAZolam (Xanax) tab 0.25 mg  0.25 mg Oral BID PRN    levothyroxine (Synthroid) tab 88 mcg  88 mcg Oral Before breakfast    [START ON 12/29/2024] cefTRIAXone (Rocephin) 1 g in sodium chloride 0.9% 100 mL IVPB-ADDV  1 g Intravenous Q24H    [START ON 12/29/2024] metroNIDAZOLE in sodium chloride 0.79% (Flagyl) 5 mg/mL IVPB premix 500 mg  500 mg Intravenous Q12H    sodium chloride 0.9% infusion   Intravenous Continuous    heparin (Porcine) 5000 UNIT/ML injection 5,000 Units  5,000 Units Subcutaneous Q8H RYLIE    acetaminophen (Tylenol Extra Strength) tab 500 mg  500 mg Oral Q4H PRN    acetaminophen (Tylenol) tab 650 mg  650 mg Oral Q4H PRN    Or    HYDROcodone-acetaminophen (Norco) 5-325 MG per tab 1 tablet  1 tablet Oral Q4H PRN    Or    HYDROcodone-acetaminophen (Norco) 5-325 MG per tab 2 tablet  2 tablet Oral Q4H PRN    morphINE PF 2 MG/ML injection 1 mg  1 mg Intravenous Q2H PRN    Or    morphINE PF 2 MG/ML injection 2 mg  2 mg Intravenous Q2H PRN    Or    morphINE PF 4 MG/ML injection 4 mg  4 mg Intravenous Q2H PRN    polyethylene glycol (PEG 3350) (Miralax) 17 g oral packet 17 g  17 g Oral Daily PRN    sennosides (Senokot) tab 17.2 mg  17.2 mg Oral Nightly PRN    bisacodyl (Dulcolax) 10 MG rectal suppository 10 mg  10 mg Rectal Daily PRN    fleet enema (Fleet) rectal enema 133 mL  1 enema Rectal Once PRN    ondansetron (Zofran) 4 MG/2ML injection 4 mg  4 mg Intravenous Q6H PRN    prochlorperazine (Compazine) 10 MG/2ML injection 5 mg  5 mg Intravenous Q8H PRN     Medications Prior to Admission   Medication Sig    Meloxicam 15 MG Oral Tab Take 1 tablet (15 mg total) by mouth daily as needed for Pain.    ALPRAZolam (XANAX) 0.25 MG Oral Tab Take 1 tablet (0.25 mg total) by mouth 2 (two) times daily as needed for Anxiety.    levothyroxine 88 MCG Oral Tab Take 1 tablet (88 mcg total) by mouth before breakfast.    semaglutide-weight management (WEGOVY) 2.4 MG/0.75ML  Subcutaneous Solution Auto-injector Inject 0.75 mL (2.4 mg total) into the skin once a week.    predniSONE 20 MG Oral Tab Take 1 tablet (20 mg total) by mouth daily.       Allergies  Allergies[1]    Review of Systems:   Review of Systems   Constitutional:  Negative for activity change, appetite change, fatigue and fever.   HENT:  Negative for congestion and facial swelling.    Eyes:  Negative for discharge and itching.   Respiratory:  Negative for apnea, chest tightness and shortness of breath.    Cardiovascular:  Negative for chest pain and palpitations.   Gastrointestinal:  Positive for abdominal pain. Negative for abdominal distention, blood in stool, diarrhea, nausea and vomiting.   Endocrine: Negative for cold intolerance and heat intolerance.   Genitourinary:  Negative for difficulty urinating, dysuria, enuresis and frequency.   Musculoskeletal:  Negative for joint swelling and neck pain.   Skin:  Negative for color change and rash.   Allergic/Immunologic: Negative for environmental allergies and food allergies.   Neurological:  Negative for dizziness, light-headedness and headaches.   Psychiatric/Behavioral:  Negative for agitation and confusion.         Physical Exam:   Vital Signs:  Blood pressure 134/69, pulse 74, temperature 97.8 °F (36.6 °C), temperature source Oral, resp. rate 17, height 5' 9\" (1.753 m), weight 195 lb (88.5 kg), SpO2 100%.     Physical Exam  Vitals and nursing note reviewed. Exam conducted with a chaperone present.   Constitutional:       General: She is not in acute distress.  HENT:      Head: Normocephalic and atraumatic.      Nose: Nose normal.      Mouth/Throat:      Mouth: Mucous membranes are moist.   Eyes:      Extraocular Movements: Extraocular movements intact.      Pupils: Pupils are equal, round, and reactive to light.   Cardiovascular:      Rate and Rhythm: Normal rate and regular rhythm.      Pulses: Normal pulses.      Heart sounds: Normal heart sounds.   Pulmonary:       Effort: Pulmonary effort is normal.      Breath sounds: Normal breath sounds.   Abdominal:      General: Abdomen is flat. There is no distension.      Palpations: Abdomen is soft.      Tenderness: There is abdominal tenderness.   Musculoskeletal:         General: Normal range of motion.      Cervical back: Normal range of motion and neck supple.   Skin:     General: Skin is warm and dry.      Capillary Refill: Capillary refill takes less than 2 seconds.   Neurological:      General: No focal deficit present.      Mental Status: She is alert and oriented to person, place, and time.   Psychiatric:         Mood and Affect: Mood normal.         Behavior: Behavior normal.          Results:     Laboratory Data:  Lab Results   Component Value Date    WBC 10.7 12/28/2024    HGB 14.1 12/28/2024    HCT 42.1 12/28/2024    .0 12/28/2024    CREATSERUM 0.81 12/28/2024    BUN 9 12/28/2024     12/28/2024    K 4.0 12/28/2024     12/28/2024    CO2 23.0 12/28/2024    GLU 99 12/28/2024    CA 9.4 12/28/2024    ALB 4.3 12/28/2024    ALKPHO 94 12/28/2024    TP 7.1 12/28/2024    AST 17 12/28/2024    ALT 10 12/28/2024    T4F 1.1 12/01/2022    TSH 1.039 01/31/2024    LIP 30 12/28/2024         Imaging:  CT ABDOMEN+PELVIS(CONTRAST ONLY)(CPT=74177)    Result Date: 12/28/2024  CONCLUSION:  1.  Prominent gallbladder distention with gallbladder wall thickening.  Correlate for cholecystitis.  No radiodense biliary stones or biliary ductal dilatation. 2.  Colonic diverticulosis.    Dictated by (CST): Shawn Resendiz MD on 12/28/2024 at 11:14 AM     Finalized by (CST): Shawn Resendiz MD on 12/28/2024 at 11:16 AM              Impression:     Acute cholecystitis        Recommendations:  - OR for laparoscopic cholecystectomy today  - NPO  - Possible discharge home after OR     Thank you for allowing me to participate in the care of your patient.      Deepali Mayo MD  [facility] - General Surgery   [address] [place] [address]  [id number]  p [phone]    12/28/2024         [1] No Known Allergies

## 2024-12-28 NOTE — ED INITIAL ASSESSMENT (HPI)
Pt presents michelle the ER sent by IC for RUQ pain x  days and Diarrhea x 2 days.    Took a amoxicillin pill she had left over yesterday and symptoms improved

## 2024-12-28 NOTE — ED PROVIDER NOTES
Chief Complaint   Patient presents with    Abdomen/Flank Pain       History obtained from: patient   services not used    HPI:     Parris Paredes is a 64 year old female who presents with abdominal pain x 2 days.  Patient localizes pain to right upper quadrant with radiation towards right flank.  Patient states symptoms started after eating prime rib.  Patient also having diarrhea for 2 to 3 days.  Patient endorses associated chills.  Patient took 1 dose of amoxicillin at home for symptoms.  Patient reports decreased appetite.  Denies nausea, vomiting, fever, urinary symptoms, blood in stool, chest pain, shortness of breath.  Denies history of abdominal surgeries.     PMH  Past Medical History:    Anxiety    Thyroid disease       PFSH    PFSH asessment screens reviewed and agree.  Nurses notes reviewed I agree with documentation.    Family History   Problem Relation Age of Onset    Hypertension Father     Heart Disease Father     Other (afib) Father          95     Family history reviewed with patient/caregiver and is not pertinent to presenting problem.  Social History     Socioeconomic History    Marital status:      Spouse name: Not on file    Number of children: Not on file    Years of education: Not on file    Highest education level: Not on file   Occupational History    Not on file   Tobacco Use    Smoking status: Never    Smokeless tobacco: Never   Vaping Use    Vaping status: Never Used   Substance and Sexual Activity    Alcohol use: Yes     Alcohol/week: 0.0 standard drinks of alcohol     Comment: Socially     Drug use: No    Sexual activity: Not on file   Other Topics Concern     Service Not Asked    Blood Transfusions Not Asked    Caffeine Concern Yes     Comment: Coffee, soda - 4 cups per day     Occupational Exposure Not Asked    Hobby Hazards Not Asked    Sleep Concern Not Asked    Stress Concern Not Asked    Weight Concern Not Asked    Special Diet Not Asked    Back  Care Not Asked    Exercise Not Asked    Bike Helmet Not Asked    Seat Belt Not Asked    Self-Exams Not Asked   Social History Narrative    Not on file     Social Drivers of Health     Financial Resource Strain: Not on file   Food Insecurity: Not on file   Transportation Needs: Not on file   Physical Activity: Not on file   Stress: Not on file   Social Connections: Not on file   Housing Stability: Not on file         ROS:   Positive for stated complaint: RUQ pain, diarrhea   All other systems reviewed and negative except as noted above.  Constitutional and Vital Signs Reviewed.    Physical Exam:     Findings:    /83   Pulse 79   Temp 98 °F (36.7 °C) (Oral)   Resp 20   SpO2 99%   GENERAL: well developed, no acute distress, non-toxic appearing   SKIN: good skin turgor, no obvious rashes  HEAD: normocephalic, atraumatic  EYES: sclera non-icteric bilaterally, conjunctiva clear bilaterally  OROPHARYNX: MMM, maintaining airway and secretions  NECK: no nuchal rigidity, no trismus, no edema, phonation normal    CARDIO: RRR, normal heart sounds   LUNGS: clear to auscultation bilaterally, no increased WOB  GI: normoactive bowel sounds, abdomen soft, RUQ tenderness, no rebound tenderness or guarding   EXTREMITIES: no cyanosis or edema, SCHWAB without difficulty  NEURO: no focal deficits  PSYCH: alert and oriented x3, answering questions appropriately, mood appropriate    MDM/Assessment/Plan:   Orders for this encounter:    No orders of the defined types were placed in this encounter.      Labs performed this visit:  No results found for this or any previous visit (from the past 10 hours).    Imaging performed this visit:  No orders to display       Medical Decision Making  DDx includes cholecystitis versus cholelithiasis versus pancreatitis versus gastroenteritis versus other.  Patient is overall well-appearing with stable vitals.  Right upper quadrant tenderness on exam.  Given limitations of the IC setting, recommend  patient be seen in ER for further evaluation of right upper quadrant pain.  Patient verbalizes understanding and agreement with this plan.  Patient declines EMS transport and states she will drive herself directly to [place] ER at this time.  Patient seen ambulating out of IC with steady gait.    Discussed case with supervising attending Dr. Krishna who is in agreement with assessment and plan.          Diagnosis:    ICD-10-CM    1. RUQ pain  R10.11           Note: This document was dictated using Dragon medical dictation software.  Proofreading was performed to the best of my ability, but errors may be present.    Oralia William PA-C

## 2024-12-28 NOTE — H&P
[facility]  part of [facility]  HISTORY AND PHYSICAL       Parris Paredes Patient Status:  Inpatient    1960  64 year old CSN [id number]   Location 473/473-A Attending Rafael Betts MD     PCP Mihaela Nails MD         DATE OF ADMISSION: 2024     CHIEF COMPLAINT: Abdominal pain    HISTORY OF PRESENT ILLNESS  This is a 64 year oldfemale presenting planing of abdominal pain.  Patient is symptom started around 2 days prior to admission.  Pain seems to be mostly located in the right upper side.  Patient reports some mild nausea, but denies vomiting.  Patient denies diarrhea.  Patient denies subjective fevers or chills.  At time of interview, patient reports abdominal pain improved after pain medication in ED.  Patient otherwise denies current chest pain, shortness of breath, nausea vomiting, fevers or chills.    PAST MEDICAL HISTORY  Past Medical History:    Anxiety    Thyroid disease        PAST SURGICAL HISTORY  Past Surgical History:   Procedure Laterality Date    Knee surgery Bilateral     Faviola biopsy stereo nodule 1 site right (cpt=19081)         ALLERGIES   Patient has no known allergies.    MEDICATIONS  Current Discharge Medication List        CONTINUE these medications which have NOT CHANGED    Details   semaglutide-weight management (WEGOVY) 2.4 MG/0.75ML Subcutaneous Solution Auto-injector Inject 0.75 mL (2.4 mg total) into the skin once a week.  Qty: 9 mL, Refills: 1    Associated Diagnoses: Obesity (BMI 30-39.9)      predniSONE 20 MG Oral Tab Take 1 tablet (20 mg total) by mouth daily.  Qty: 5 tablet, Refills: 0    Associated Diagnoses: Disorder of left eustachian tube      Meloxicam 15 MG Oral Tab Take 1 tablet (15 mg total) by mouth daily as needed for Pain.  Qty: 30 tablet, Refills: 0      ALPRAZolam (XANAX) 0.25 MG Oral Tab Take 1 tablet (0.25 mg total) by mouth 2 (two) times daily as needed for Anxiety.  Qty: 20 tablet, Refills: 0    Associated  Diagnoses: Anxiety      levothyroxine 88 MCG Oral Tab Take 1 tablet (88 mcg total) by mouth before breakfast.  Qty: 90 tablet, Refills: 3    Associated Diagnoses: Hypothyroidism, unspecified type           STOP taking these medications       erythromycin 5 MG/GM Ophthalmic Ointment Comments:   Reason for Stopping:         pseudoephedrine  MG Oral Tablet 12 Hr Comments:   Reason for Stopping:               SOCIAL HISTORY  Social History     Socioeconomic History    Marital status:    Tobacco Use    Smoking status: Never    Smokeless tobacco: Never   Vaping Use    Vaping status: Never Used   Substance and Sexual Activity    Alcohol use: Yes     Alcohol/week: 0.0 standard drinks of alcohol     Comment: Socially     Drug use: No   Other Topics Concern    Caffeine Concern Yes     Comment: Coffee, soda - 4 cups per day        FAMILY HISTORY  Family History   Problem Relation Age of Onset    Hypertension Father     Heart Disease Father     Other (afib) Father          95       PHYSICAL EXAM  Vital signs:  /77 (BP Location: Left arm)   Pulse 69   Temp 98 °F (36.7 °C)   Resp 18   Ht 5' 9\" (1.753 m)   Wt 195 lb (88.5 kg)   SpO2 99%   BMI 28.80 kg/m²      GENERAL:  Awake and alert, in no acute distress.  HEART:  Regular rhythm.  Regular rate   LUNGS:  Air entry was good.  No increased work of breathing or wheezes   ABDOMEN: Soft and non-tender.    PSYCHIATRIC: Normal mood      IMAGING    CT ABDOMEN+PELVIS(CONTRAST ONLY)(CPT=74177)    Result Date: 2024  CONCLUSION:  1.  Prominent gallbladder distention with gallbladder wall thickening.  Correlate for cholecystitis.  No radiodense biliary stones or biliary ductal dilatation. 2.  Colonic diverticulosis.    Dictated by (CST): Shawn Resendiz MD on 2024 at 11:14 AM     Finalized by (CST): Shawn Resendiz MD on 2024 at 11:16 AM            Data:  Recent Labs   Lab 24  0940   RBC 4.61   HGB 14.1   HCT 42.1   MCV 91.3   MCH 30.6   MCHC  33.5   RDW 13.0   NEPRELIM 7.92*   WBC 10.7   .0     Recent Labs   Lab 12/28/24  0940   GLU 99   BUN 9   CREATSERUM 0.81   CA 9.4   ALB 4.3      K 4.0      CO2 23.0   ALKPHO 94   AST 17   ALT 10   BILT 0.8   TP 7.1       ASSESSMENT/PLAN      Acute cholecystitis  -Patient presenting with abdominal pain  -CT abdomen pelvis reviewed.  Noted prominent gallbladder distention with gallbladder wall thickening.  Concerns for acute cholecystitis.  -Patient started on empiric antibiotics and IV fluids  -Keep n.p.o.  -Pain control as needed  -Antiemetics as needed  -General Surgery consulted, appreciate recommendations    Hypothyroidism  -Restart home dosing of Levothyroxine     Addendum: Surgery reports recommending lap cholecystectomy.  Able to fit patient on schedule for today.  Planning for OR this afternoon.    Plan of care discussed with patient at bedside.  Discussed with ED physician and RN. Decision made that pt needs hospitalization for further management/monitoring.      Rafael Betts MD    This note was prepared using Dragon Medical voice recognition dictation software. As a result errors may occur. When identified these errors have been corrected. While every attempt is made to correct errors during dictation discrepancies may still exist

## 2024-12-28 NOTE — ED INITIAL ASSESSMENT (HPI)
Pt reports RUQ radiating to right upper back, diarrhea for two days last week after [place] dinner. Abd pain started yesterday and is persistent but slightly better. Denies nausea or vomiting, fevers, but has had chills. Denies urinary symptoms.

## 2024-12-28 NOTE — DISCHARGE INSTRUCTIONS
POST-OPERATIVE INSTRUCTIONS LAPAROSCOPIC/ROBOTIC SURGERY    Thank you very much for allowing me to participate in your care, it is truly a privilege. Below please see discharge orders that will help you during your recovery. Please do not hesitate to call the office at [phone] for any questions. After hours, the same number will allow you to reach the on-call surgeon.     Call the office [phone] to schedule your post-operative appointment with Dr. Mayo for 2 weeks if not already scheduled.     Change bandages daily or more frequently if needed.  Keep incisions clean with soap/ water daily.   Cover incision(s) as needed.  If you have skin glue this will come off on its own    May place an ice pack over your incisions on and off (15min) at a time for the next 24-48 hours.    Resume regular diet as tolerated (recommend starting with liquids)    General guidelines for activity:      Avoid strenuous activity or lifting anything heavier than 15 pounds.    It is OK to be up and walking around. Going up and down stairs is also OK.    Do what is comfortable: stop and rest when you feel tired.    It is OK to shower after 24 hours    You will have pain medicine ordered. Take as directed/needed.    Some discomfort, mild bruising, and swelling are not unusual; please call my office if you have any severe pain, bleeding, or high fever (over 101°F)    During the robotic/laparoscopic procedure that you had, gas is pumped into the abdominal cavity.  You may feel abdominal, shoulder, or rib pain for a few days due to this.    Resume home medications (see medication reconciliation sheet)       Do NOT drive for one day and while taking your narcotic pain medicine.        Watch for signs of infection:    Excessive warmth or bright redness around your incisions    Leakage of bloody or cloudy fluid from you incisions    Fever over 100.5    If you experience constipation  Increase your water intake.  Increase your activity;  walking is best.  An over the counter stool softener or mild laxative may be necessary if you still have not had a bowel movement after several days.       Please call the office at [phone] for any questions and to make your post op appointment if needed. Thank you again for allowing me to participate in your care, and get well soon!      Deepali Mayo MD  [facility] - General Surgery   [address] [place] [address]  [place]  p [phone]

## 2024-12-29 NOTE — PROGRESS NOTES
Patient received from ED alert and oriented X4, vitals stable. IVFs initiated. Flagyl given. NPO maintained for surgical procedure. Report given to pre-op nurse. Patient sent to pre-op and returned from PACU stable. Four laps with dermabond. Tolerated general diet. Deenis nausea and vomiting. Pain being managed with Norco. Able to ambulate to bathroom independently. Voided post surgery. Cleared for discharge. Discharge instructions to be reviewed with patient by night RN.

## 2024-12-29 NOTE — PLAN OF CARE
IV taken out, AVS printed, and discharge education given. Pt taken by wheel chair by tech to main entrance for discharge. No complaints of nausea, surgical sites clean dry and intact, all belongings taken with patient.  Problem: Patient Centered Care  Goal: Patient preferences are identified and integrated in the patient's plan of care  Description: Interventions:  - What would you like us to know as we care for you?  - Provide timely, complete, and accurate information to patient/family  - Incorporate patient and family knowledge, values, beliefs, and cultural backgrounds into the planning and delivery of care  - Encourage patient/family to participate in care and decision-making at the level they choose  - Honor patient and family perspectives and choices  Outcome: Progressing     Problem: GASTROINTESTINAL - ADULT  Goal: Minimal or absence of nausea and vomiting  Description: INTERVENTIONS:  - Maintain adequate hydration with IV or PO as ordered and tolerated  - Nasogastric tube to low intermittent suction as ordered  - Evaluate effectiveness of ordered antiemetic medications  - Provide nonpharmacologic comfort measures as appropriate  - Advance diet as tolerated, if ordered  - Obtain nutritional consult as needed  - Evaluate fluid balance  Outcome: Completed  Goal: Maintains or returns to baseline bowel function  Description: INTERVENTIONS:  - Assess bowel function  - Maintain adequate hydration with IV or PO as ordered and tolerated  - Evaluate effectiveness of GI medications  - Encourage mobilization and activity  - Obtain nutritional consult as needed  - Establish a toileting routine/schedule  - Consider collaborating with pharmacy to review patient's medication profile  Outcome: Completed  Goal: Maintains adequate nutritional intake (undernourished)  Description: INTERVENTIONS:  - Monitor percentage of each meal consumed  - Identify factors contributing to decreased intake, treat as appropriate  - Assist with  meals as needed  - Monitor I&O, WT and lab values  - Obtain nutritional consult as needed  - Optimize oral hygiene and moisture  - Encourage food from home; allow for food preferences  - Enhance eating environment  Outcome: Completed

## 2024-12-29 NOTE — DISCHARGE SUMMARY
[facility]  part of [facility]    Discharge Summary    Parris Paredes Patient Status:  Inpatient    1960 MRN [id number]   Location [facility] 4W/SW/SE Attending Rafael Betts MD   Hosp Day # 0 PCP Mihaela Nails MD     Date of Admission: 2024     Date of Discharge: 24         59-90 High Risk  29-58 Medium Risk  0-28   Low Risk.    TCM Follow-Up Recommendation:  LACE < 29: Low Risk of readmission after discharge from the hospital; Still recommend for TCM follow-up.    DISCHARGE DX: Principal Problem:    Acute cholecystitis       The patient was seen and examined on day of discharge and this discharge summary is in conjunction with any daily progress note from day of discharge.    HPI per admitting physician: \"This is a 64 year oldfemale presenting planing of abdominal pain.  Patient is symptom started around 2 days prior to admission.  Pain seems to be mostly located in the right upper side.  Patient reports some mild nausea, but denies vomiting.  Patient denies diarrhea.  Patient denies subjective fevers or chills.  At time of interview, patient reports abdominal pain improved after pain medication in ED.  Patient otherwise denies current chest pain, shortness of breath, nausea vomiting, fevers or chills. \"    Hospital Course:      Acute cholecystitis  -Patient presenting with abdominal pain  -CT abdomen pelvis reviewed.  Noted prominent gallbladder distention with gallbladder wall thickening.  Concerns for acute cholecystitis.  -Patient treated with empiric antibiotics and IV fluids  -Pain control as needed  -Antiemetics as needed  -General Surgery consulted, s/p Lap Cholecystectomy on . Cleared for dc to home as pt is tolerating diet.   -Follow up as outpt      Hypothyroidism  -Continue home dosing of Levothyroxine         Physical Exam:    Vitals:    24 1545 24 1555 24 1605 24 1620   BP: 115/55 114/65 117/69 125/81   BP  Location:    Right arm   Pulse: 79 68 71 79   Resp: 12 14 11 16   Temp:   97.5 °F (36.4 °C) 97.1 °F (36.2 °C)   TempSrc:   Temporal Oral   SpO2: 97% 95% 97% 96%   Weight:       Height:         Patient Weight for the past 72 hrs:   Weight   12/28/24 0858 195 lb (88.5 kg)   12/28/24 1256 195 lb (88.5 kg)       Intake/Output Summary (Last 24 hours) at 12/28/2024 1907  Last data filed at 12/28/2024 1700  Gross per 24 hour   Intake 2250 ml   Output --   Net 2250 ml       GENERAL:  Awake and alert, in no acute distress.  HEART:  Regular rhythm.  Regular rate   LUNGS:  Air entry was good.  No increased work of breathing or wheezes   ABDOMEN: Soft and minimally-tender.    PSYCHIATRIC: Normal mood    CULTURE:   No results found for this visit on 12/28/24.    IMAGING STUDIES: SOME MAY NEED FOLLOW UP WITH PCP   CT ABDOMEN+PELVIS(CONTRAST ONLY)(CPT=74177)    Result Date: 12/28/2024  CONCLUSION:  1.  Prominent gallbladder distention with gallbladder wall thickening.  Correlate for cholecystitis.  No radiodense biliary stones or biliary ductal dilatation. 2.  Colonic diverticulosis.    Dictated by (CST): Shawn Resendiz MD on 12/28/2024 at 11:14 AM     Finalized by (CST): Shawn Resendiz MD on 12/28/2024 at 11:16 AM           LABS :     Lab Results   Component Value Date    WBC 10.7 12/28/2024    HGB 14.1 12/28/2024    HCT 42.1 12/28/2024    .0 12/28/2024    CREATSERUM 0.81 12/28/2024    BUN 9 12/28/2024     12/28/2024    K 4.0 12/28/2024     12/28/2024    CO2 23.0 12/28/2024    GLU 99 12/28/2024    CA 9.4 12/28/2024    ALB 4.3 12/28/2024    ALKPHO 94 12/28/2024    BILT 0.8 12/28/2024    TP 7.1 12/28/2024    AST 17 12/28/2024    ALT 10 12/28/2024    T4F 1.1 12/01/2022    TSH 1.039 01/31/2024    LIP 30 12/28/2024       Recent Labs   Lab 12/28/24  0940   RBC 4.61   HGB 14.1   HCT 42.1   MCV 91.3   MCH 30.6   MCHC 33.5   RDW 13.0   NEPRELIM 7.92*   WBC 10.7   .0     Recent Labs   Lab 12/28/24  0940   GLU 99   BUN  9   CREATSERUM 0.81   CA 9.4   ALB 4.3      K 4.0      CO2 23.0   ALKPHO 94   AST 17   ALT 10   BILT 0.8   TP 7.1     No results found for: \"PT\", \"INR\"    Disposition: Discharge to Home    Condition at Discharge: Stable     Discharge Medications:      Discharge Medications        START taking these medications        Instructions Prescription details   acetaminophen 500 MG Tabs  Commonly known as: Tylenol Extra Strength      Take 2 tablets (1,000 mg total) by mouth every 4 (four) hours as needed for Pain.   Quantity: 30 tablet  Refills: 0     Ibuprofen 200 MG Caps  Commonly known as: Advil      Take 2 capsules (400 mg total) by mouth every 6 (six) hours as needed.   Stop taking on: January 27, 2025  Quantity: 120 capsule  Refills: 0     oxyCODONE 5 MG Tabs      Take 1 tablet (5 mg total) by mouth every 4 (four) hours as needed for Pain.   Quantity: 5 tablet  Refills: 0            CHANGE how you take these medications        Instructions Prescription details   predniSONE 20 MG Tabs  Commonly known as: Deltasone  What changed: Another medication with the same name was removed. Continue taking this medication, and follow the directions you see here.      Take 1 tablet (20 mg total) by mouth daily.   Quantity: 5 tablet  Refills: 0            CONTINUE taking these medications        Instructions Prescription details   ALPRAZolam 0.25 MG Tabs  Commonly known as: Xanax      Take 1 tablet (0.25 mg total) by mouth 2 (two) times daily as needed for Anxiety.   Quantity: 20 tablet  Refills: 0     levothyroxine 88 MCG Tabs  Commonly known as: Synthroid      Take 1 tablet (88 mcg total) by mouth before breakfast.   Quantity: 90 tablet  Refills: 3     Meloxicam 15 MG Tabs      Take 1 tablet (15 mg total) by mouth daily as needed for Pain.   Quantity: 30 tablet  Refills: 0     Wegovy 2.4 MG/0.75ML Soaj  Generic drug: semaglutide-weight management      Inject 0.75 mL (2.4 mg total) into the skin once a week.   Quantity:  9 mL  Refills: 1            STOP taking these medications      erythromycin 5 MG/GM Oint  Commonly known as: Romycin        pseudoephedrine  MG Tb12  Commonly known as: Sudafed ER                  Where to Get Your Medications        These medications were sent to [facility] [address] [place], [place] - [address] [facility] [phone], [phone]  [address] [facility] [address]      Phone: [phone]   acetaminophen 500 MG Tabs  Ibuprofen 200 MG Caps  oxyCODONE 5 MG Tabs         Follow up Visits  [facility]  [address] [facility] [address]  [facility] [id number] [phone]    Follow up in 2 week(s)      Mihaela Nails MD    Consultants         Provider   Role Specialty     Deepail Mayo MD      Consulting Physician SURGERY, GENERAL              Other Discharge Instructions:       Discharge Instructions         POST-OPERATIVE INSTRUCTIONS LAPAROSCOPIC/ROBOTIC SURGERY    Thank you very much for allowing me to participate in your care, it is truly a privilege. Below please see discharge orders that will help you during your recovery. Please do not hesitate to call the office at [phone] for any questions. After hours, the same number will allow you to reach the on-call surgeon.     Call the office [phone] to schedule your post-operative appointment with Dr. Mayo for 2 weeks if not already scheduled.     Change bandages daily or more frequently if needed.  Keep incisions clean with soap/ water daily.   Cover incision(s) as needed.  If you have skin glue this will come off on its own    May place an ice pack over your incisions on and off (15min) at a time for the next 24-48 hours.    Resume regular diet as tolerated (recommend starting with liquids)    General guidelines for activity:      Avoid strenuous activity or lifting anything heavier than 15 pounds.    It is OK to be up and walking around. Going up and down stairs is also OK.    Do what is comfortable: stop and rest when you feel tired.     It is OK to shower after 24 hours    You will have pain medicine ordered. Take as directed/needed.    Some discomfort, mild bruising, and swelling are not unusual; please call my office if you have any severe pain, bleeding, or high fever (over 101°F)    During the robotic/laparoscopic procedure that you had, gas is pumped into the abdominal cavity.  You may feel abdominal, shoulder, or rib pain for a few days due to this.    Resume home medications (see medication reconciliation sheet)       Do NOT drive for one day and while taking your narcotic pain medicine.        Watch for signs of infection:    Excessive warmth or bright redness around your incisions    Leakage of bloody or cloudy fluid from you incisions    Fever over 100.5    If you experience constipation  Increase your water intake.  Increase your activity; walking is best.  An over the counter stool softener or mild laxative may be necessary if you still have not had a bowel movement after several days.       Please call the office at [phone] for any questions and to make your post op appointment if needed. Thank you again for allowing me to participate in your care, and get well soon!      Deepali Mayo MD  [facility] - General Surgery   [address] [place] [address]  p [phone]            ----------------------------------------------------  40 MIN SPENT ON THIS DC   Rafael Betts MD    12/28/2024

## 2025-01-02 NOTE — PAYOR COMM NOTE
--------------  DISCHARGE REVIEW    Payor: [facility]  Subscriber #:  [id number]  Authorization Number: [id number]    Admit date: 24  Admit time:  12:26 PM  Discharge Date: 2024  9:08 PM     Admitting Physician: Rafael Betts MD  Attending Physician:  No att. providers found  Primary Care Physician: Mihaela Nails MD          Discharge Summary Notes        Discharge Summary signed by Rafael Betts MD at 2024  8:34 AM       Author: Rafael Betts MD Specialty: HOSPITALIST, Internal Medicine Author Type: Physician    Filed: 2024  8:34 AM Date of Service: 2024  7:07 PM Status: Signed    : Rafael Betts MD (Physician)         [facility]  part of [facility]    Discharge Summary    Parris Paredes Patient Status:  Inpatient    1960 MRN [id number]   Location [facility] 4W/SW/SE Attending Rafael Betts MD   Hosp Day # 0 PCP Mihaela Nails MD     Date of Admission: 2024     Date of Discharge: 24         59-90 High Risk  29-58 Medium Risk  0-28   Low Risk.    TCM Follow-Up Recommendation:  LACE < 29: Low Risk of readmission after discharge from the hospital; Still recommend for TCM follow-up.    DISCHARGE DX: Principal Problem:    Acute cholecystitis       The patient was seen and examined on day of discharge and this discharge summary is in conjunction with any daily progress note from day of discharge.    HPI per admitting physician: \"This is a 64 year oldfemale presenting planing of abdominal pain.  Patient is symptom started around 2 days prior to admission.  Pain seems to be mostly located in the right upper side.  Patient reports some mild nausea, but denies vomiting.  Patient denies diarrhea.  Patient denies subjective fevers or chills.  At time of interview, patient reports abdominal pain improved after pain medication in ED.  Patient otherwise denies current chest pain, shortness of breath,  nausea vomiting, fevers or chills. \"    Hospital Course:      Acute cholecystitis  -Patient presenting with abdominal pain  -CT abdomen pelvis reviewed.  Noted prominent gallbladder distention with gallbladder wall thickening.  Concerns for acute cholecystitis.  -Patient treated with empiric antibiotics and IV fluids  -Pain control as needed  -Antiemetics as needed  -General Surgery consulted, s/p Lap Cholecystectomy on 12/28. Cleared for dc to home as pt is tolerating diet.   -Follow up as outpt      Hypothyroidism  -Continue home dosing of Levothyroxine         Physical Exam:    Vitals:    12/28/24 1545 12/28/24 1555 12/28/24 1605 12/28/24 1620   BP: 115/55 114/65 117/69 125/81   BP Location:    Right arm   Pulse: 79 68 71 79   Resp: 12 14 11 16   Temp:   97.5 °F (36.4 °C) 97.1 °F (36.2 °C)   TempSrc:   Temporal Oral   SpO2: 97% 95% 97% 96%   Weight:       Height:         Patient Weight for the past 72 hrs:   Weight   12/28/24 0858 195 lb (88.5 kg)   12/28/24 1256 195 lb (88.5 kg)       Intake/Output Summary (Last 24 hours) at 12/28/2024 1907  Last data filed at 12/28/2024 1700  Gross per 24 hour   Intake 2250 ml   Output --   Net 2250 ml       GENERAL:  Awake and alert, in no acute distress.  HEART:  Regular rhythm.  Regular rate   LUNGS:  Air entry was good.  No increased work of breathing or wheezes   ABDOMEN: Soft and minimally-tender.    PSYCHIATRIC: Normal mood    CULTURE:   No results found for this visit on 12/28/24.    IMAGING STUDIES: SOME MAY NEED FOLLOW UP WITH PCP   CT ABDOMEN+PELVIS(CONTRAST ONLY)(CPT=74177)    Result Date: 12/28/2024  CONCLUSION:  1.  Prominent gallbladder distention with gallbladder wall thickening.  Correlate for cholecystitis.  No radiodense biliary stones or biliary ductal dilatation. 2.  Colonic diverticulosis.    Dictated by (CST): Shawn Resendiz MD on 12/28/2024 at 11:14 AM     Finalized by (CST): Shawn Resendiz MD on 12/28/2024 at 11:16 AM           LABS :     Lab Results    Component Value Date    WBC 10.7 12/28/2024    HGB 14.1 12/28/2024    HCT 42.1 12/28/2024    .0 12/28/2024    CREATSERUM 0.81 12/28/2024    BUN 9 12/28/2024     12/28/2024    K 4.0 12/28/2024     12/28/2024    CO2 23.0 12/28/2024    GLU 99 12/28/2024    CA 9.4 12/28/2024    ALB 4.3 12/28/2024    ALKPHO 94 12/28/2024    BILT 0.8 12/28/2024    TP 7.1 12/28/2024    AST 17 12/28/2024    ALT 10 12/28/2024    T4F 1.1 12/01/2022    TSH 1.039 01/31/2024    LIP 30 12/28/2024       Recent Labs   Lab 12/28/24  0940   RBC 4.61   HGB 14.1   HCT 42.1   MCV 91.3   MCH 30.6   MCHC 33.5   RDW 13.0   NEPRELIM 7.92*   WBC 10.7   .0     Recent Labs   Lab 12/28/24  0940   GLU 99   BUN 9   CREATSERUM 0.81   CA 9.4   ALB 4.3      K 4.0      CO2 23.0   ALKPHO 94   AST 17   ALT 10   BILT 0.8   TP 7.1     No results found for: \"PT\", \"INR\"    Disposition: Discharge to Home    Condition at Discharge: Stable     Discharge Medications:      Discharge Medications        START taking these medications        Instructions Prescription details   acetaminophen 500 MG Tabs  Commonly known as: Tylenol Extra Strength      Take 2 tablets (1,000 mg total) by mouth every 4 (four) hours as needed for Pain.   Quantity: 30 tablet  Refills: 0     Ibuprofen 200 MG Caps  Commonly known as: Advil      Take 2 capsules (400 mg total) by mouth every 6 (six) hours as needed.   Stop taking on: January 27, 2025  Quantity: 120 capsule  Refills: 0     oxyCODONE 5 MG Tabs      Take 1 tablet (5 mg total) by mouth every 4 (four) hours as needed for Pain.   Quantity: 5 tablet  Refills: 0            CHANGE how you take these medications        Instructions Prescription details   predniSONE 20 MG Tabs  Commonly known as: Deltasone  What changed: Another medication with the same name was removed. Continue taking this medication, and follow the directions you see here.      Take 1 tablet (20 mg total) by mouth daily.   Quantity: 5  tablet  Refills: 0            CONTINUE taking these medications        Instructions Prescription details   ALPRAZolam 0.25 MG Tabs  Commonly known as: Xanax      Take 1 tablet (0.25 mg total) by mouth 2 (two) times daily as needed for Anxiety.   Quantity: 20 tablet  Refills: 0     levothyroxine 88 MCG Tabs  Commonly known as: Synthroid      Take 1 tablet (88 mcg total) by mouth before breakfast.   Quantity: 90 tablet  Refills: 3     Meloxicam 15 MG Tabs      Take 1 tablet (15 mg total) by mouth daily as needed for Pain.   Quantity: 30 tablet  Refills: 0     Wegovy 2.4 MG/0.75ML Soaj  Generic drug: semaglutide-weight management      Inject 0.75 mL (2.4 mg total) into the skin once a week.   Quantity: 9 mL  Refills: 1            STOP taking these medications      erythromycin 5 MG/GM Oint  Commonly known as: Romycin        pseudoephedrine  MG Tb12  Commonly known as: Sudafed ER                  Where to Get Your Medications        These medications were sent to [facility] [address] [place] [facility] [address] [facility] [phone], [phone]  [address] [facility] [address]      Phone: [phone]   acetaminophen 500 MG Tabs  Ibuprofen 200 MG Caps  oxyCODONE 5 MG Tabs         Follow up Visits  [facility]  [address] [facility] [address]  [facility] [id number] [phone]    Follow up in 2 week(s)      Mihaela Nails MD    Consultants         Provider   Role Specialty     Deepali Mayo MD      Consulting Physician SURGERY, GENERAL              Other Discharge Instructions:       Discharge Instructions         POST-OPERATIVE INSTRUCTIONS LAPAROSCOPIC/ROBOTIC SURGERY    Thank you very much for allowing me to participate in your care, it is truly a privilege. Below please see discharge orders that will help you during your recovery. Please do not hesitate to call the office at [phone] for any questions. After hours, the same number will allow you to reach the on-call surgeon.     Call the office  [phone] to schedule your post-operative appointment with Dr. Mayo for 2 weeks if not already scheduled.     Change bandages daily or more frequently if needed.  Keep incisions clean with soap/ water daily.   Cover incision(s) as needed.  If you have skin glue this will come off on its own    May place an ice pack over your incisions on and off (15min) at a time for the next 24-48 hours.    Resume regular diet as tolerated (recommend starting with liquids)    General guidelines for activity:      Avoid strenuous activity or lifting anything heavier than 15 pounds.    It is OK to be up and walking around. Going up and down stairs is also OK.    Do what is comfortable: stop and rest when you feel tired.    It is OK to shower after 24 hours    You will have pain medicine ordered. Take as directed/needed.    Some discomfort, mild bruising, and swelling are not unusual; please call my office if you have any severe pain, bleeding, or high fever (over 101°F)    During the robotic/laparoscopic procedure that you had, gas is pumped into the abdominal cavity.  You may feel abdominal, shoulder, or rib pain for a few days due to this.    Resume home medications (see medication reconciliation sheet)       Do NOT drive for one day and while taking your narcotic pain medicine.        Watch for signs of infection:    Excessive warmth or bright redness around your incisions    Leakage of bloody or cloudy fluid from you incisions    Fever over 100.5    If you experience constipation  Increase your water intake.  Increase your activity; walking is best.  An over the counter stool softener or mild laxative may be necessary if you still have not had a bowel movement after several days.       Please call the office at [phone] for any questions and to make your post op appointment if needed. Thank you again for allowing me to participate in your care, and get well soon!      Deepali Mayo MD  [facility] - General  Surgery   [address] [place] [address]  p [phone]            ----------------------------------------------------  40 MIN SPENT ON THIS DC   Rafael Betts MD    12/28/2024      Electronically signed by Rafael Betts MD on 12/29/2024  8:34 AM         REVIEWER COMMENTS

## 2025-01-15 ENCOUNTER — NURSE ONLY (OUTPATIENT)
Dept: SURGERY | Facility: CLINIC | Age: 65
End: 2025-01-15
Payer: COMMERCIAL

## 2025-01-15 VITALS — HEART RATE: 82 BPM | SYSTOLIC BLOOD PRESSURE: 107 MMHG | DIASTOLIC BLOOD PRESSURE: 51 MMHG

## 2025-01-15 DIAGNOSIS — Z48.89 ENCOUNTER FOR POSTOPERATIVE CARE: Primary | ICD-10-CM

## 2025-01-15 PROCEDURE — 3074F SYST BP LT 130 MM HG: CPT

## 2025-01-15 PROCEDURE — 99024 POSTOP FOLLOW-UP VISIT: CPT

## 2025-01-15 PROCEDURE — 3078F DIAST BP <80 MM HG: CPT

## 2025-01-15 NOTE — PROGRESS NOTES
S:  Parris Paredes is a 64 year old female sp Bárbara Butcher  Doing well, tolerating a general diet, generally normal bowel movements, no calf tenderness nor lower extremity edema, no shortness of breath, no chest pain.       O:  /51   Pulse 82   GEN:  No acute distress  L: nonlabored respirations  H: reg rate  Abd:  Soft, NT,ND, incisions C/D/I, no erythema.  Extr: No edema, no calf tenderness    Path:  Reviewed w pt    Assessment   1. Encounter for postoperative care      Doing well kimebrly Butcher.  Activity as tolerated  Maintain a low fat diet.  Maintain good hydration.  F/u prn.       Nir Carney PA-C

## 2025-01-26 ENCOUNTER — APPOINTMENT (OUTPATIENT)
Dept: CT IMAGING | Facility: HOSPITAL | Age: 65
DRG: 446 | End: 2025-01-26
Attending: EMERGENCY MEDICINE
Payer: COMMERCIAL

## 2025-01-26 ENCOUNTER — HOSPITAL ENCOUNTER (INPATIENT)
Facility: HOSPITAL | Age: 65
LOS: 1 days | Discharge: HOME | DRG: 446 | End: 2025-01-28
Attending: EMERGENCY MEDICINE | Admitting: HOSPITALIST
Payer: COMMERCIAL

## 2025-01-26 ENCOUNTER — HOSPITAL ENCOUNTER (INPATIENT)
Facility: HOSPITAL | Age: 65
LOS: 1 days | Discharge: HOME | End: 2025-01-28
Attending: EMERGENCY MEDICINE | Admitting: HOSPITALIST
Payer: COMMERCIAL

## 2025-01-26 ENCOUNTER — APPOINTMENT (OUTPATIENT)
Dept: CT IMAGING | Facility: HOSPITAL | Age: 65
End: 2025-01-26
Attending: EMERGENCY MEDICINE
Payer: COMMERCIAL

## 2025-01-26 DIAGNOSIS — S32.010D COMPRESSION FRACTURE OF L1 VERTEBRA WITH ROUTINE HEALING, SUBSEQUENT ENCOUNTER: ICD-10-CM

## 2025-01-26 DIAGNOSIS — K80.50 CHOLEDOCHOLITHIASIS: ICD-10-CM

## 2025-01-26 DIAGNOSIS — R10.9 ABDOMINAL PAIN OF UNKNOWN ETIOLOGY: Primary | ICD-10-CM

## 2025-01-26 PROBLEM — R73.9 HYPERGLYCEMIA: Status: ACTIVE | Noted: 2025-01-26

## 2025-01-26 PROBLEM — R79.89 AZOTEMIA: Status: ACTIVE | Noted: 2025-01-26

## 2025-01-26 PROBLEM — E87.20 METABOLIC ACIDOSIS: Status: ACTIVE | Noted: 2025-01-26

## 2025-01-26 LAB
ALBUMIN SERPL-MCNC: 4.4 G/DL (ref 3.2–4.8)
ALBUMIN/GLOB SERPL: 1.5 {RATIO} (ref 1–2)
ALP LIVER SERPL-CCNC: 364 U/L
ALT SERPL-CCNC: 688 U/L
ANION GAP SERPL CALC-SCNC: 13 MMOL/L (ref 0–18)
AST SERPL-CCNC: 919 U/L (ref ?–34)
BASOPHILS # BLD AUTO: 0.03 X10(3) UL (ref 0–0.2)
BASOPHILS NFR BLD AUTO: 0.3 %
BILIRUB SERPL-MCNC: 2.7 MG/DL (ref 0.2–1.1)
BILIRUB UR QL: NEGATIVE
BUN BLD-MCNC: 17 MG/DL (ref 9–23)
BUN/CREAT SERPL: 21.3 (ref 10–20)
CALCIUM BLD-MCNC: 9.9 MG/DL (ref 8.7–10.4)
CHLORIDE SERPL-SCNC: 104 MMOL/L (ref 98–112)
CLARITY UR: CLEAR
CO2 SERPL-SCNC: 21 MMOL/L (ref 21–32)
COLOR UR: YELLOW
CREAT BLD-MCNC: 0.8 MG/DL
DEPRECATED RDW RBC AUTO: 42.5 FL (ref 35.1–46.3)
EGFRCR SERPLBLD CKD-EPI 2021: 83 ML/MIN/1.73M2 (ref 60–?)
EOSINOPHIL # BLD AUTO: 0.07 X10(3) UL (ref 0–0.7)
EOSINOPHIL NFR BLD AUTO: 0.7 %
ERYTHROCYTE [DISTWIDTH] IN BLOOD BY AUTOMATED COUNT: 12.4 % (ref 11–15)
GLOBULIN PLAS-MCNC: 3 G/DL (ref 2–3.5)
GLUCOSE BLD-MCNC: 101 MG/DL (ref 70–99)
GLUCOSE UR-MCNC: NORMAL MG/DL
HCT VFR BLD AUTO: 43.8 %
HGB BLD-MCNC: 14.4 G/DL
IMM GRANULOCYTES # BLD AUTO: 0.03 X10(3) UL (ref 0–1)
IMM GRANULOCYTES NFR BLD: 0.3 %
KETONES UR-MCNC: 80 MG/DL
LEUKOCYTE ESTERASE UR QL STRIP.AUTO: 250
LIPASE SERPL-CCNC: 30 U/L (ref 12–53)
LYMPHOCYTES # BLD AUTO: 1.32 X10(3) UL (ref 1–4)
LYMPHOCYTES NFR BLD AUTO: 13.2 %
MCH RBC QN AUTO: 30.3 PG (ref 26–34)
MCHC RBC AUTO-ENTMCNC: 32.9 G/DL (ref 31–37)
MCV RBC AUTO: 92 FL
MONOCYTES # BLD AUTO: 0.7 X10(3) UL (ref 0.1–1)
MONOCYTES NFR BLD AUTO: 7 %
NEUTROPHILS # BLD AUTO: 7.82 X10 (3) UL (ref 1.5–7.7)
NEUTROPHILS # BLD AUTO: 7.82 X10(3) UL (ref 1.5–7.7)
NEUTROPHILS NFR BLD AUTO: 78.5 %
NITRITE UR QL STRIP.AUTO: NEGATIVE
OSMOLALITY SERPL CALC.SUM OF ELEC: 288 MOSM/KG (ref 275–295)
PH UR: 6 [PH] (ref 5–8)
PLATELET # BLD AUTO: 261 10(3)UL (ref 150–450)
POTASSIUM SERPL-SCNC: 3.9 MMOL/L (ref 3.5–5.1)
PROT SERPL-MCNC: 7.4 G/DL (ref 5.7–8.2)
PROT UR-MCNC: NEGATIVE MG/DL
RBC # BLD AUTO: 4.76 X10(6)UL
SODIUM SERPL-SCNC: 138 MMOL/L (ref 136–145)
SP GR UR STRIP: >1.03 (ref 1–1.03)
TROPONIN I SERPL HS-MCNC: 4 NG/L
UROBILINOGEN UR STRIP-ACNC: NORMAL
WBC # BLD AUTO: 10 X10(3) UL (ref 4–11)

## 2025-01-26 PROCEDURE — 71260 CT THORAX DX C+: CPT | Performed by: EMERGENCY MEDICINE

## 2025-01-26 PROCEDURE — 74177 CT ABD & PELVIS W/CONTRAST: CPT | Performed by: EMERGENCY MEDICINE

## 2025-01-26 PROCEDURE — 99223 1ST HOSP IP/OBS HIGH 75: CPT | Performed by: STUDENT IN AN ORGANIZED HEALTH CARE EDUCATION/TRAINING PROGRAM

## 2025-01-26 RX ORDER — ALPRAZOLAM 0.25 MG/1
0.25 TABLET ORAL 2 TIMES DAILY PRN
Status: DISCONTINUED | OUTPATIENT
Start: 2025-01-26 | End: 2025-01-28

## 2025-01-26 RX ORDER — MORPHINE SULFATE 2 MG/ML
2 INJECTION, SOLUTION INTRAMUSCULAR; INTRAVENOUS EVERY 2 HOUR PRN
Status: DISCONTINUED | OUTPATIENT
Start: 2025-01-26 | End: 2025-01-28

## 2025-01-26 RX ORDER — SENNOSIDES 8.6 MG
17.2 TABLET ORAL NIGHTLY PRN
Status: DISCONTINUED | OUTPATIENT
Start: 2025-01-26 | End: 2025-01-28

## 2025-01-26 RX ORDER — MAGNESIUM HYDROXIDE/ALUMINUM HYDROXICE/SIMETHICONE 120; 1200; 1200 MG/30ML; MG/30ML; MG/30ML
30 SUSPENSION ORAL ONCE
Status: COMPLETED | OUTPATIENT
Start: 2025-01-26 | End: 2025-01-26

## 2025-01-26 RX ORDER — ACETAMINOPHEN 500 MG
500 TABLET ORAL EVERY 4 HOURS PRN
Status: DISCONTINUED | OUTPATIENT
Start: 2025-01-26 | End: 2025-01-28

## 2025-01-26 RX ORDER — ONDANSETRON 2 MG/ML
4 INJECTION INTRAMUSCULAR; INTRAVENOUS ONCE
Status: COMPLETED | OUTPATIENT
Start: 2025-01-26 | End: 2025-01-26

## 2025-01-26 RX ORDER — FAMOTIDINE 10 MG/ML
20 INJECTION, SOLUTION INTRAVENOUS ONCE
Status: COMPLETED | OUTPATIENT
Start: 2025-01-26 | End: 2025-01-26

## 2025-01-26 RX ORDER — LEVOTHYROXINE SODIUM 88 UG/1
88 TABLET ORAL
Status: DISCONTINUED | OUTPATIENT
Start: 2025-01-27 | End: 2025-01-28

## 2025-01-26 RX ORDER — SODIUM CHLORIDE 9 MG/ML
INJECTION, SOLUTION INTRAVENOUS CONTINUOUS
Status: ACTIVE | OUTPATIENT
Start: 2025-01-26 | End: 2025-01-27

## 2025-01-26 RX ORDER — SODIUM PHOSPHATE, DIBASIC AND SODIUM PHOSPHATE, MONOBASIC 7; 19 G/230ML; G/230ML
1 ENEMA RECTAL ONCE AS NEEDED
Status: DISCONTINUED | OUTPATIENT
Start: 2025-01-26 | End: 2025-01-28

## 2025-01-26 RX ORDER — MORPHINE SULFATE 4 MG/ML
4 INJECTION, SOLUTION INTRAMUSCULAR; INTRAVENOUS EVERY 2 HOUR PRN
Status: DISCONTINUED | OUTPATIENT
Start: 2025-01-26 | End: 2025-01-28

## 2025-01-26 RX ORDER — ENOXAPARIN SODIUM 100 MG/ML
40 INJECTION SUBCUTANEOUS DAILY
Status: DISCONTINUED | OUTPATIENT
Start: 2025-01-27 | End: 2025-01-27

## 2025-01-26 RX ORDER — POLYETHYLENE GLYCOL 3350 17 G/17G
17 POWDER, FOR SOLUTION ORAL DAILY PRN
Status: DISCONTINUED | OUTPATIENT
Start: 2025-01-26 | End: 2025-01-28

## 2025-01-26 RX ORDER — MORPHINE SULFATE 2 MG/ML
1 INJECTION, SOLUTION INTRAMUSCULAR; INTRAVENOUS EVERY 2 HOUR PRN
Status: DISCONTINUED | OUTPATIENT
Start: 2025-01-26 | End: 2025-01-28

## 2025-01-26 RX ORDER — ONDANSETRON 2 MG/ML
4 INJECTION INTRAMUSCULAR; INTRAVENOUS EVERY 6 HOURS PRN
Status: DISCONTINUED | OUTPATIENT
Start: 2025-01-26 | End: 2025-01-28

## 2025-01-26 RX ORDER — PROCHLORPERAZINE EDISYLATE 5 MG/ML
5 INJECTION INTRAMUSCULAR; INTRAVENOUS EVERY 8 HOURS PRN
Status: DISCONTINUED | OUTPATIENT
Start: 2025-01-26 | End: 2025-01-28

## 2025-01-26 RX ORDER — BISACODYL 10 MG
10 SUPPOSITORY, RECTAL RECTAL
Status: DISCONTINUED | OUTPATIENT
Start: 2025-01-26 | End: 2025-01-28

## 2025-01-26 RX ORDER — MORPHINE SULFATE 2 MG/ML
2 INJECTION, SOLUTION INTRAMUSCULAR; INTRAVENOUS ONCE
Status: COMPLETED | OUTPATIENT
Start: 2025-01-26 | End: 2025-01-26

## 2025-01-26 NOTE — ED INITIAL ASSESSMENT (HPI)
Pt arrives ambulatory to ED for c/o epigastric pain that started yesterday, pt states worse with food. Hx Cholecystectomy 12/28/24. Aox4, speaking in full sentences    Denies NVD.

## 2025-01-27 ENCOUNTER — APPOINTMENT (OUTPATIENT)
Dept: GENERAL RADIOLOGY | Facility: HOSPITAL | Age: 65
DRG: 446 | End: 2025-01-27
Attending: INTERNAL MEDICINE
Payer: COMMERCIAL

## 2025-01-27 ENCOUNTER — ANESTHESIA EVENT (OUTPATIENT)
Dept: ENDOSCOPY | Facility: HOSPITAL | Age: 65
End: 2025-01-27
Payer: COMMERCIAL

## 2025-01-27 ENCOUNTER — ANESTHESIA (OUTPATIENT)
Dept: ENDOSCOPY | Facility: HOSPITAL | Age: 65
End: 2025-01-27
Payer: COMMERCIAL

## 2025-01-27 ENCOUNTER — APPOINTMENT (OUTPATIENT)
Dept: MRI IMAGING | Facility: HOSPITAL | Age: 65
DRG: 446 | End: 2025-01-27
Attending: EMERGENCY MEDICINE
Payer: COMMERCIAL

## 2025-01-27 ENCOUNTER — APPOINTMENT (OUTPATIENT)
Dept: MRI IMAGING | Facility: HOSPITAL | Age: 65
End: 2025-01-27
Attending: EMERGENCY MEDICINE
Payer: COMMERCIAL

## 2025-01-27 ENCOUNTER — APPOINTMENT (OUTPATIENT)
Dept: GENERAL RADIOLOGY | Facility: HOSPITAL | Age: 65
End: 2025-01-27
Attending: INTERNAL MEDICINE
Payer: COMMERCIAL

## 2025-01-27 PROBLEM — R93.3 ABNORMAL MAGNETIC RESONANCE CHOLANGIOPANCREATOGRAPHY (MRCP): Status: ACTIVE | Noted: 2025-01-27

## 2025-01-27 PROBLEM — K80.50 CHOLEDOCHOLITHIASIS: Status: ACTIVE | Noted: 2025-01-27

## 2025-01-27 LAB
ALBUMIN SERPL-MCNC: 3.8 G/DL (ref 3.2–4.8)
ALP LIVER SERPL-CCNC: 384 U/L
ALT SERPL-CCNC: 824 U/L
ANION GAP SERPL CALC-SCNC: 13 MMOL/L (ref 0–18)
AST SERPL-CCNC: 880 U/L (ref ?–34)
ATRIAL RATE: 63 BPM
BASOPHILS # BLD AUTO: 0.03 X10(3) UL (ref 0–0.2)
BASOPHILS NFR BLD AUTO: 0.4 %
BILIRUB DIRECT SERPL-MCNC: 1.5 MG/DL (ref ?–0.3)
BILIRUB SERPL-MCNC: 2.5 MG/DL (ref 0.2–1.1)
BUN BLD-MCNC: 16 MG/DL (ref 9–23)
BUN/CREAT SERPL: 21.6 (ref 10–20)
CALCIUM BLD-MCNC: 9.3 MG/DL (ref 8.7–10.4)
CHLORIDE SERPL-SCNC: 106 MMOL/L (ref 98–112)
CO2 SERPL-SCNC: 21 MMOL/L (ref 21–32)
CREAT BLD-MCNC: 0.74 MG/DL
DEPRECATED RDW RBC AUTO: 43.5 FL (ref 35.1–46.3)
EGFRCR SERPLBLD CKD-EPI 2021: 91 ML/MIN/1.73M2 (ref 60–?)
EOSINOPHIL # BLD AUTO: 0.28 X10(3) UL (ref 0–0.7)
EOSINOPHIL NFR BLD AUTO: 3.6 %
ERYTHROCYTE [DISTWIDTH] IN BLOOD BY AUTOMATED COUNT: 12.8 % (ref 11–15)
GLUCOSE BLD-MCNC: 74 MG/DL (ref 70–99)
HAV IGM SER QL: NONREACTIVE
HBV CORE IGM SER QL: NONREACTIVE
HBV SURFACE AG SERPL QL IA: NONREACTIVE
HCT VFR BLD AUTO: 39.3 %
HCV AB SERPL QL IA: NONREACTIVE
HGB BLD-MCNC: 12.9 G/DL
IMM GRANULOCYTES # BLD AUTO: 0.02 X10(3) UL (ref 0–1)
IMM GRANULOCYTES NFR BLD: 0.3 %
LYMPHOCYTES # BLD AUTO: 0.62 X10(3) UL (ref 1–4)
LYMPHOCYTES NFR BLD AUTO: 8.1 %
MCH RBC QN AUTO: 30.4 PG (ref 26–34)
MCHC RBC AUTO-ENTMCNC: 32.8 G/DL (ref 31–37)
MCV RBC AUTO: 92.5 FL
MONOCYTES # BLD AUTO: 0.68 X10(3) UL (ref 0.1–1)
MONOCYTES NFR BLD AUTO: 8.8 %
NEUTROPHILS # BLD AUTO: 6.06 X10 (3) UL (ref 1.5–7.7)
NEUTROPHILS # BLD AUTO: 6.06 X10(3) UL (ref 1.5–7.7)
NEUTROPHILS NFR BLD AUTO: 78.8 %
OSMOLALITY SERPL CALC.SUM OF ELEC: 290 MOSM/KG (ref 275–295)
P AXIS: 68 DEGREES
P-R INTERVAL: 152 MS
PLATELET # BLD AUTO: 221 10(3)UL (ref 150–450)
POTASSIUM SERPL-SCNC: 4 MMOL/L (ref 3.5–5.1)
PROT SERPL-MCNC: 6.5 G/DL (ref 5.7–8.2)
Q-T INTERVAL: 412 MS
QRS DURATION: 98 MS
QTC CALCULATION (BEZET): 421 MS
R AXIS: 67 DEGREES
RBC # BLD AUTO: 4.25 X10(6)UL
SODIUM SERPL-SCNC: 140 MMOL/L (ref 136–145)
T AXIS: 68 DEGREES
VENTRICULAR RATE: 63 BPM
WBC # BLD AUTO: 7.7 X10(3) UL (ref 4–11)

## 2025-01-27 PROCEDURE — 74181 MRI ABDOMEN W/O CONTRAST: CPT | Performed by: EMERGENCY MEDICINE

## 2025-01-27 PROCEDURE — 43264 ERCP REMOVE DUCT CALCULI: CPT | Performed by: INTERNAL MEDICINE

## 2025-01-27 PROCEDURE — BF101ZZ FLUOROSCOPY OF BILE DUCTS USING LOW OSMOLAR CONTRAST: ICD-10-PCS | Performed by: INTERNAL MEDICINE

## 2025-01-27 PROCEDURE — 43262 ENDO CHOLANGIOPANCREATOGRAPH: CPT | Performed by: INTERNAL MEDICINE

## 2025-01-27 PROCEDURE — 99223 1ST HOSP IP/OBS HIGH 75: CPT | Performed by: INTERNAL MEDICINE

## 2025-01-27 PROCEDURE — 99233 SBSQ HOSP IP/OBS HIGH 50: CPT | Performed by: HOSPITALIST

## 2025-01-27 PROCEDURE — 76376 3D RENDER W/INTRP POSTPROCES: CPT | Performed by: EMERGENCY MEDICINE

## 2025-01-27 PROCEDURE — 74330 X-RAY BILE/PANC ENDOSCOPY: CPT | Performed by: INTERNAL MEDICINE

## 2025-01-27 PROCEDURE — 99222 1ST HOSP IP/OBS MODERATE 55: CPT | Performed by: STUDENT IN AN ORGANIZED HEALTH CARE EDUCATION/TRAINING PROGRAM

## 2025-01-27 PROCEDURE — 0FC98ZZ EXTIRPATION OF MATTER FROM COMMON BILE DUCT, VIA NATURAL OR ARTIFICIAL OPENING ENDOSCOPIC: ICD-10-PCS | Performed by: INTERNAL MEDICINE

## 2025-01-27 RX ORDER — HYDROMORPHONE HYDROCHLORIDE 1 MG/ML
0.6 INJECTION, SOLUTION INTRAMUSCULAR; INTRAVENOUS; SUBCUTANEOUS EVERY 5 MIN PRN
Status: DISCONTINUED | OUTPATIENT
Start: 2025-01-27 | End: 2025-01-27 | Stop reason: HOSPADM

## 2025-01-27 RX ORDER — SODIUM CHLORIDE, SODIUM LACTATE, POTASSIUM CHLORIDE, CALCIUM CHLORIDE 600; 310; 30; 20 MG/100ML; MG/100ML; MG/100ML; MG/100ML
INJECTION, SOLUTION INTRAVENOUS CONTINUOUS PRN
Status: DISCONTINUED | OUTPATIENT
Start: 2025-01-27 | End: 2025-01-27 | Stop reason: SURG

## 2025-01-27 RX ORDER — ECHINACEA PURPUREA EXTRACT 125 MG
1 TABLET ORAL
Status: DISCONTINUED | OUTPATIENT
Start: 2025-01-27 | End: 2025-01-28

## 2025-01-27 RX ORDER — HYDROMORPHONE HYDROCHLORIDE 1 MG/ML
0.2 INJECTION, SOLUTION INTRAMUSCULAR; INTRAVENOUS; SUBCUTANEOUS EVERY 5 MIN PRN
Status: DISCONTINUED | OUTPATIENT
Start: 2025-01-27 | End: 2025-01-27 | Stop reason: HOSPADM

## 2025-01-27 RX ORDER — HYDROMORPHONE HYDROCHLORIDE 1 MG/ML
0.4 INJECTION, SOLUTION INTRAMUSCULAR; INTRAVENOUS; SUBCUTANEOUS EVERY 5 MIN PRN
Status: DISCONTINUED | OUTPATIENT
Start: 2025-01-27 | End: 2025-01-27 | Stop reason: HOSPADM

## 2025-01-27 RX ORDER — MORPHINE SULFATE 4 MG/ML
4 INJECTION, SOLUTION INTRAMUSCULAR; INTRAVENOUS EVERY 10 MIN PRN
Status: DISCONTINUED | OUTPATIENT
Start: 2025-01-27 | End: 2025-01-27 | Stop reason: HOSPADM

## 2025-01-27 RX ORDER — SODIUM CHLORIDE, SODIUM LACTATE, POTASSIUM CHLORIDE, CALCIUM CHLORIDE 600; 310; 30; 20 MG/100ML; MG/100ML; MG/100ML; MG/100ML
INJECTION, SOLUTION INTRAVENOUS CONTINUOUS
Status: DISCONTINUED | OUTPATIENT
Start: 2025-01-27 | End: 2025-01-27 | Stop reason: HOSPADM

## 2025-01-27 RX ORDER — MIDAZOLAM HYDROCHLORIDE 1 MG/ML
INJECTION INTRAMUSCULAR; INTRAVENOUS AS NEEDED
Status: DISCONTINUED | OUTPATIENT
Start: 2025-01-27 | End: 2025-01-27 | Stop reason: SURG

## 2025-01-27 RX ORDER — NALOXONE HYDROCHLORIDE 0.4 MG/ML
0.08 INJECTION, SOLUTION INTRAMUSCULAR; INTRAVENOUS; SUBCUTANEOUS AS NEEDED
Status: DISCONTINUED | OUTPATIENT
Start: 2025-01-27 | End: 2025-01-27 | Stop reason: HOSPADM

## 2025-01-27 RX ORDER — MORPHINE SULFATE 10 MG/ML
6 INJECTION, SOLUTION INTRAMUSCULAR; INTRAVENOUS EVERY 10 MIN PRN
Status: DISCONTINUED | OUTPATIENT
Start: 2025-01-27 | End: 2025-01-27 | Stop reason: HOSPADM

## 2025-01-27 RX ORDER — HYDROCODONE BITARTRATE AND ACETAMINOPHEN 5; 325 MG/1; MG/1
1 TABLET ORAL EVERY 6 HOURS PRN
Status: DISCONTINUED | OUTPATIENT
Start: 2025-01-27 | End: 2025-01-28

## 2025-01-27 RX ORDER — INDOMETHACIN 100 MG
SUPPOSITORY, RECTAL RECTAL
Status: DISCONTINUED | OUTPATIENT
Start: 2025-01-27 | End: 2025-01-27 | Stop reason: HOSPADM

## 2025-01-27 RX ORDER — SODIUM CHLORIDE 9 MG/ML
INJECTION, SOLUTION INTRAVENOUS CONTINUOUS
Status: DISCONTINUED | OUTPATIENT
Start: 2025-01-27 | End: 2025-01-28

## 2025-01-27 RX ORDER — MORPHINE SULFATE 4 MG/ML
2 INJECTION, SOLUTION INTRAMUSCULAR; INTRAVENOUS EVERY 10 MIN PRN
Status: DISCONTINUED | OUTPATIENT
Start: 2025-01-27 | End: 2025-01-27 | Stop reason: HOSPADM

## 2025-01-27 RX ADMIN — ONDANSETRON 4 MG: 2 INJECTION INTRAMUSCULAR; INTRAVENOUS at 17:48:00

## 2025-01-27 RX ADMIN — MIDAZOLAM HYDROCHLORIDE 2 MG: 1 INJECTION INTRAMUSCULAR; INTRAVENOUS at 17:30:00

## 2025-01-27 RX ADMIN — SODIUM CHLORIDE, SODIUM LACTATE, POTASSIUM CHLORIDE, CALCIUM CHLORIDE: 600; 310; 30; 20 INJECTION, SOLUTION INTRAVENOUS at 17:18:00

## 2025-01-27 NOTE — PROGRESS NOTES
[facility]  part of [facility]    Progress Note    Parris Paredes Patient Status:  Observation    1960 MRN [id number]   Location [facility] 4W/SW/SE Attending Naa Ratliff MD   Hosp Day # 0 PCP Mihaela Nails MD     Chief Complaint:   Chief Complaint   Patient presents with    Abdomen/Flank Pain   Epigastric pain    Subjective:   Parris Paredes is feeling better. No abd pain no N/V.       Objective:   Objective:    Blood pressure 113/73, pulse 78, temperature 98.7 °F (37.1 °C), temperature source Oral, resp. rate 16, height 5' 9\" (1.753 m), weight 186 lb 8 oz (84.6 kg), SpO2 94%.    Physical Exam:    General: No acute distress.   Respiratory: Clear to auscultation bilaterally. No wheezes. No rhonchi.  Cardiovascular: S1, S2. Regular rate and rhythm. No murmurs, rubs or gallops.   Abdomen: Soft, nontender, nondistended.  Positive bowel sounds. No rebound or guarding.  Neurologic: No focal neurological deficits.   Musculoskeletal: Moves all extremities.  Extremities: No edema.      Results:   Results:    Labs:  Recent Labs   Lab 25  1631 25  0813   WBC 10.0 7.7   HGB 14.4 12.9   MCV 92.0 92.5   .0 221.0       Recent Labs   Lab 25  1631 25  0813   * 74   BUN 17 16   CREATSERUM 0.80 0.74   CA 9.9 9.3   ALB 4.4 3.8    140   K 3.9 4.0    106   CO2 21.0 21.0   ALKPHO 364* 384*   * 880*   * 824*   BILT 2.7* 2.5*   TP 7.4 6.5       Estimated Creatinine Clearance: 82.4 mL/min (based on SCr of 0.74 mg/dL).    No results for input(s): \"PTP\", \"INR\" in the last 168 hours.         Culture:  Hospital Encounter on 25   1. Urine Culture, Routine     Status: None (Preliminary result)    Collection Time: 25  7:35 PM    Specimen: Urine, clean catch   Result Value Ref Range    Urine Culture No Growth at <18 hours N/A       Cardiac  No results for input(s): \"TROP\", \"PBNP\" in the last 168 hours.      Imaging: Imaging  data reviewed in [facility].  MRI ABDOMEN AND MRCP W/3D (CPT=74181/24259)    Result Date: 1/27/2025  CONCLUSION:   Choledocholithiasis with a 0.5 cm stone within the mid to distal common bile duct.  Mild biliary ductal dilation.    Dictated by (CST): Jeovany Maldonado MD on 1/27/2025 at 7:23 AM     Finalized by (CST): Jeovany Maldonado MD on 1/27/2025 at 7:29 AM          CT ABDOMEN+PELVIS(CONTRAST ONLY)(CPT=74177)    Result Date: 1/26/2025  CONCLUSION:   No evidence of acute abnormality in the abdomen or pelvis.  Cholecystectomy.  Mild dilatation of the common bile duct and slight central intrahepatic biliary ductal dilatation is nonspecific but may reflect post cholecystectomy change.  Advise biliary laboratory correlation and consider further imaging with MRCP as clinically warranted.  Colonic diverticulosis.  Lesser incidental findings as above.    Dictated by (CST): Robin Contreras MD on 1/26/2025 at 6:13 PM     Finalized by (CST): Robin Contreras MD on 1/26/2025 at 6:20 PM          CT CHEST PE AORTA (IV ONLY) (CPT=71260)    Result Date: 1/26/2025  CONCLUSION:   No evidence of pulmonary embolism to the large proximal bilateral segmental arterial level.     Dictated by (CST): Robin Contreras MD on 1/26/2025 at 6:09 PM     Finalized by (CST): Robin Contreras MD on 1/26/2025 at 6:13 PM           Medications:    enoxaparin  40 mg Subcutaneous Daily    pantoprazole  40 mg Intravenous Daily    levothyroxine  88 mcg Oral Daily @ 0700         Assessment and Plan:   Assessment & Plan:        Choledocholithiasis  Transaminitis  Abd pain Nausea   MRI abd reviewed. Choledocholithiasis  Had cholecstectomy 12/2024  General surgery on consult. GI consult.   NPO  IVF, pain control.   Will need ERCP  Hypothyroidism  Levothyroxine      >55min spent, >50% spent counseling and coordinating care in the form of educating pt/family and d/w consultants and staff. Most of the time spent discussing the above plan.        Plan of care discussed  with patient or family at bedside.    Naa Ratliff MD  Hospitalist          Supplementary Documentation:     Quality:  DVT Prophylaxis: SCD stop lovenox for ERCP  CODE status: Full  Dispo: per clinical course            Estimated date of discharge: TBD  Discharge is dependent on: clinical stability  At this point Ms. Paredes is expected to be discharge to: home

## 2025-01-27 NOTE — OPERATIVE REPORT
Endoscopic retrograde cholangio-pancreatography (ERCP) report    Parris Paredes     1960 Age 64 year old   PCP Mihaela Nails MD Endoscopist Chaitanya Vasquez MD     Date of procedure: 25    Procedure: ERCP w/ sphincterotomy, stone removal     Pre-operative diagnosis: upper abdominal pain, abnormal MRCP, choledocholithiasis     S/p cholecystectomy 25    Post-operative diagnosis: choledocholithiasis    Sedation: general anesthesia    Medication: indomethacin 100 mg per rectum    Consent: Prior to the procedure, the patient was examined in the pre-procedure area. An H&P was performed. All allergies were reviewed. We discussed the risks/benefits and alternatives to this procedure including use of X ray/fluoroscopy, as well as the planned sedation/anesthesia which was general endotracheal anesthesia.  Informed consent was obtained from the patient after the risks of the procedure were discussed, including but not limited to bleeding, perforation, aspiration, infection, pancreatitis, or possibility of a missed lesion as well as the risks of anesthesia including but not limited to cardiopulmonary complications and pancreatitis all requiring or leading to prolonged hospital stay, surgical intervention, and/or be life threatening. I also mentioned the available alternatives including imaging modalities such as MRCP and EUS as well as alternative therapeutic approaches such as interventional radiology guided approaches or surgery. We also discussed that if a stent (pancreatic or biliary) is placed, it could require a repeat endoscopic procedure in the future. All questions were answered to the patient’s satisfaction. The patient elected to proceed with ERCP with intervention [i.e. stent, sphincterotomy, etc.] as indicated.. The patient expressed understanding.The patient signed informed consent and elected to proceed with ERCP with intervention [i.e. sphincterotomy, dilatation, stent placement,  biopsy, stone removal, etc.] as indicated.     Procedure: The patient was intubated with general endotracheal anesthesia was performed by the anesthetist. After, the patient was carefully moved and turned into the semi-prone position on to the fluoroscopy table/cart  by the team. Prior to starting, a time out was performed. All areas of the patient were examined and managed by the team to avoid any pressure point injuries. The lubricated tip of the therapeutic duodenoscope was carefully inserted and advanced using direct visualization into the posterior pharynx and ultimately into the esophagus, stomach, and descending duodenum and to the ampulla. Only CO2 was used for insufflation during this procedure.    Air was then withdrawn and the endoscope was removed. The patient tolerated the procedure well. There were no immediate postoperative complications. The patient’s vital signs were monitored throughout the procedure and remained stable.    Estimated blood loss: insignificant    Specimens collected:  none    Complications: none    ERCP findings:    The duodenoscope was passed to the 2nd portion of the duodenum and reduced into short/standard position uneventfully. The very limited views of the upper gastrointestinal tract visualized in passing of the duodenoscope were grossly unremarkable. The  film was normal. The ampulla was identified and appeared normal. The bile duct was selectively cannulated with a DASH21 sphincterotome with an 0.021 in jagwire on initial attempt. The wire was advanced into the intrahepatic biliary system. Contrast was injected confirming bile duct cannulation. A biliary sphincterotomy was performed without post-sphincterotomy bleeding. The sphinctertome was removed and exchanged for a 9-12 mm balloon extraction catheter. Cholangiogram was performed. The intrahepatics appeared normal. The bile duct was prominent in size at  in size at 8 mm. Multiple balloon sweeps were performed with  removal of sludge and stone fragments. There was excellent drainage of contrast and bile.  There were no further filling defects and final occlusion cholangiogram.  Further balloon sweeps were unremarkable without any other stones removed and smooth withdrawal through the ampullary orifice and distal bile duct.  The pancreatic duct was neither cannulated nor injected with contrast. The duodenoscope was withdrawn, liquid contents suctioned and procedure completed. The patient was extubated in the procedure room and taken to PACU where she was examined and doing well.    Impression:  Choledocholithiasis s/p biliary sphincterotomy and stone removal    Recommend:   1. Monitor liver enzymes  2. Clear liquid diet as tolerated  3. IV fluids    Discussed with daughter over the phone as requested.    MD Vlad Cordero[facility] - Gastroenterology  1/27/2025

## 2025-01-27 NOTE — PLAN OF CARE
Cindy is A&Ox4. Remote tele in place. Voiding freely to the bathroom. Self ambulating. Received pain medication. Received zofran for nausea. NPO. Plan for MRI today. Call light and belongings in reach. Bed locked and in lowest position. Frequent rounding by nursing staff.     Problem: Patient Centered Care  Goal: Patient preferences are identified and integrated in the patient's plan of care  Description: Interventions:  - What would you like us to know as we care for you?   - Provide timely, complete, and accurate information to patient/family  - Incorporate patient and family knowledge, values, beliefs, and cultural backgrounds into the planning and delivery of care  - Encourage patient/family to participate in care and decision-making at the level they choose  - Honor patient and family perspectives and choices  Outcome: Progressing     Problem: GASTROINTESTINAL - ADULT  Goal: Minimal or absence of nausea and vomiting  Description: INTERVENTIONS:  - Maintain adequate hydration with IV or PO as ordered and tolerated  - Nasogastric tube to low intermittent suction as ordered  - Evaluate effectiveness of ordered antiemetic medications  - Provide nonpharmacologic comfort measures as appropriate  - Advance diet as tolerated, if ordered  - Obtain nutritional consult as needed  - Evaluate fluid balance  Outcome: Progressing     Problem: Patient/Family Goals  Goal: Patient/Family Long Term Goal  Description: Patient's Long Term Goal: To discharge     Interventions:  - Monitor vital signs   - Monitor appropriate labs   - Pain management   - Administer medications per order   - Follow MD orders   - Diagnostics per order   - Update/inform patient and family on plan of care   - Discharge planning   - See additional Care Plan goals for specific interventions  Outcome: Progressing  Goal: Patient/Family Short Term Goal  Description: Patient's Short Term Goal: To manage pain and nausea     Interventions:   - Pain medication  -  Non-pharmacological pain management   - Antiemetics   - Follow MD orders     - See additional Care Plan goals for specific interventions  Outcome: Progressing     Problem: PAIN - ADULT  Goal: Verbalizes/displays adequate comfort level or patient's stated pain goal  Description: INTERVENTIONS:  - Encourage pt to monitor pain and request assistance  - Assess pain using appropriate pain scale  - Administer analgesics based on type and severity of pain and evaluate response  - Implement non-pharmacological measures as appropriate and evaluate response  - Consider cultural and social influences on pain and pain management  - Manage/alleviate anxiety  - Utilize distraction and/or relaxation techniques  - Monitor for opioid side effects  - Notify MD/LIP if interventions unsuccessful or patient reports new pain  - Anticipate increased pain with activity and pre-medicate as appropriate  Outcome: Progressing     Problem: RISK FOR INFECTION - ADULT  Goal: Absence of fever/infection during anticipated neutropenic period  Description: INTERVENTIONS  - Monitor WBC  - Administer growth factors as ordered  - Implement neutropenic guidelines  Outcome: Progressing     Problem: DISCHARGE PLANNING  Goal: Discharge to home or other facility with appropriate resources  Description: INTERVENTIONS:  - Identify barriers to discharge w/pt and caregiver  - Include patient/family/discharge partner in discharge planning  - Arrange for needed discharge resources and transportation as appropriate  - Identify discharge learning needs (meds, wound care, etc)  - Arrange for interpreters to assist at discharge as needed  - Consider post-discharge preferences of patient/family/discharge partner  - Complete POLST form as appropriate  - Assess patient's ability to be responsible for managing their own health  - Refer to Case Management Department for coordinating discharge planning if the patient needs post-hospital services based on physician/LIP order  or complex needs related to functional status, cognitive ability or social support system  Outcome: Progressing

## 2025-01-27 NOTE — ED PROVIDER NOTES
[facility]  Emergency Department Attending Note     Chief Complaint:   Chief Complaint   Patient presents with    Abdomen/Flank Pain     HISTORY OF PRESENT ILLNESS:   Parris Paredes is a 64 year old female who presents to the ED 1 month status post laparoscopic cholecystectomy presents with a complaint of abdominal pain over the last 2 days.  Epigastric burning pain nonradiating.  Quite severe but not specifically associated with food intake or breathing.  Some associated nausea no vomiting.  Denies diarrhea.  Denies bleeding.  Denies urinary complaint.  Denies chest pain dyspnea     MEDICAL & SOCIAL HISTORY:   Past Medical History:    Anxiety    Thyroid disease      Past Surgical History:   Procedure Laterality Date    Cholecystectomy  12/28/2024    laparoscopic cholecystectomy    Knee surgery Bilateral 1990    Faviola biopsy stereo nodule 1 site right (cpt=19081)  2015      Social History     Socioeconomic History    Marital status:    Tobacco Use    Smoking status: Never    Smokeless tobacco: Never   Vaping Use    Vaping status: Never Used   Substance and Sexual Activity    Alcohol use: Yes     Alcohol/week: 0.0 standard drinks of alcohol     Comment: Socially     Drug use: No   Other Topics Concern    Caffeine Concern Yes     Comment: Coffee, soda - 4 cups per day      Social Drivers of Health     Food Insecurity: No Food Insecurity (12/28/2024)    Food Insecurity     Food Insecurity: Never true   Transportation Needs: No Transportation Needs (12/28/2024)    Transportation Needs     Lack of Transportation: No   Housing Stability: Low Risk  (12/28/2024)    Housing Stability     Housing Instability: No    Allergies[1]   Current Outpatient Medications   Medication Sig Dispense Refill    oxyCODONE 5 MG Oral Tab Take 1 tablet (5 mg total) by mouth every 4 (four) hours as needed for Pain. 5 tablet 0    acetaminophen 500 MG Oral Tab Take 2 tablets (1,000 mg total) by mouth every 4 (four) hours as needed  for Pain. 30 tablet 0    Ibuprofen (ADVIL) 200 MG Oral Cap Take 2 capsules (400 mg total) by mouth every 6 (six) hours as needed. 120 capsule 0    semaglutide-weight management (WEGOVY) 2.4 MG/0.75ML Subcutaneous Solution Auto-injector Inject 0.75 mL (2.4 mg total) into the skin once a week. 9 mL 1    predniSONE 20 MG Oral Tab Take 1 tablet (20 mg total) by mouth daily. 5 tablet 0    Meloxicam 15 MG Oral Tab Take 1 tablet (15 mg total) by mouth daily as needed for Pain. 30 tablet 0    ALPRAZolam (XANAX) 0.25 MG Oral Tab Take 1 tablet (0.25 mg total) by mouth 2 (two) times daily as needed for Anxiety. 20 tablet 0    levothyroxine 88 MCG Oral Tab Take 1 tablet (88 mcg total) by mouth before breakfast. 90 tablet 3          REVIEW OF SYSTEMS   A 10 point review of systems was completed and is negative except as listed in history of present illness      PHYSICAL EXAM   Vitals: /67   Pulse 61   Temp 98.4 °F (36.9 °C) (Temporal)   Resp 18   Wt 86.2 kg   SpO2 98%   BMI 28.06 kg/m²   /67   Pulse 61   Temp 98.4 °F (36.9 °C) (Temporal)   Resp 18   Wt 86.2 kg   SpO2 98%   BMI 28.06 kg/m²     General: A&Ox3, NAD  Constitutional: Well developed, well nourished, nontoxic  Head: atraumatic, normocephalic   Eyes: conjuctiva clear, no icterus, PERRL, EOMI, vision grossly normal  Ears: normal external appearance, no drainage  Nose:  Atraumatic, no swelling, no drainage, nares patent  Throat:  Moist pink mucous membranes, airway is patent  Neck:  Soft supple, no masses, no tracheal deviation, no stridor  Chest:  No bruising or abrasions, no tenderness, no deformity  Cardiac:  Regular rate and rhythm  Lung:  No distress, no retractions. Clear to auscultation bilaterally, no w/r/r  Abdomen:  Soft, tender to palpation to epigastrium without rebound or guarding or rigidity or pulsatile mass negative not sonographic Paredes sign, nondistended, normal BS  Back: No stepoff/deformity  Extremities: FROM all ext, no  deformities, intact equal peripheral pulses, no cyanosis or edema  Neuro: No facial droop, no slurred speech, moving all extremities freely, SILT to the bilateral upper and lower extremities  Psych: A&Ox3, normal affect, cooperative, calm  Skin: No rash, no petechiae/purpura, warm, dry      RESULTS  LABS:   Results for orders placed or performed during the hospital encounter of 01/26/25   EKG 12 Lead    Collection Time: 01/26/25  3:16 PM   Result Value Ref Range    Ventricular rate 63 BPM    Atrial rate 63 BPM    P-R Interval 152 ms    QRS Duration 98 ms    Q-T Interval 412 ms    QTC Calculation (Bezet) 421 ms    P Axis 68 degrees    R Axis 67 degrees    T Axis 68 degrees   Lipase    Collection Time: 01/26/25  4:31 PM   Result Value Ref Range    Lipase 30 12 - 53 U/L   Comp Metabolic Panel (14)    Collection Time: 01/26/25  4:31 PM   Result Value Ref Range    Glucose 101 (H) 70 - 99 mg/dL    Sodium 138 136 - 145 mmol/L    Potassium 3.9 3.5 - 5.1 mmol/L    Chloride 104 98 - 112 mmol/L    CO2 21.0 21.0 - 32.0 mmol/L    Anion Gap 13 0 - 18 mmol/L    BUN 17 9 - 23 mg/dL    Creatinine 0.80 0.55 - 1.02 mg/dL    BUN/CREA Ratio 21.3 (H) 10.0 - 20.0    Calcium, Total 9.9 8.7 - 10.4 mg/dL    Calculated Osmolality 288 275 - 295 mOsm/kg    eGFR-Cr 83 >=60 mL/min/1.73m2     (H) 10 - 49 U/L     (H) <34 U/L    Alkaline Phosphatase 364 (H) 50 - 130 U/L    Bilirubin, Total 2.7 (H) 0.2 - 1.1 mg/dL    Total Protein 7.4 5.7 - 8.2 g/dL    Albumin 4.4 3.2 - 4.8 g/dL    Globulin  3.0 2.0 - 3.5 g/dL    A/G Ratio 1.5 1.0 - 2.0   CBC With Differential With Platelet    Collection Time: 01/26/25  4:31 PM   Result Value Ref Range    WBC 10.0 4.0 - 11.0 x10(3) uL    RBC 4.76 3.80 - 5.30 x10(6)uL    HGB 14.4 12.0 - 16.0 g/dL    HCT 43.8 35.0 - 48.0 %    MCV 92.0 80.0 - 100.0 fL    MCH 30.3 26.0 - 34.0 pg    MCHC 32.9 31.0 - 37.0 g/dL    RDW-SD 42.5 35.1 - 46.3 fL    RDW 12.4 11.0 - 15.0 %    .0 150.0 - 450.0 10(3)uL     Neutrophil Absolute Prelim 7.82 (H) 1.50 - 7.70 x10 (3) uL    Neutrophil Absolute 7.82 (H) 1.50 - 7.70 x10(3) uL    Lymphocyte Absolute 1.32 1.00 - 4.00 x10(3) uL    Monocyte Absolute 0.70 0.10 - 1.00 x10(3) uL    Eosinophil Absolute 0.07 0.00 - 0.70 x10(3) uL    Basophil Absolute 0.03 0.00 - 0.20 x10(3) uL    Immature Granulocyte Absolute 0.03 0.00 - 1.00 x10(3) uL    Neutrophil % 78.5 %    Lymphocyte % 13.2 %    Monocyte % 7.0 %    Eosinophil % 0.7 %    Basophil % 0.3 %    Immature Granulocyte % 0.3 %   Troponin I (High Sensitivity)    Collection Time: 01/26/25  4:31 PM   Result Value Ref Range    Troponin I (High Sensitivity) 4 <=34 ng/L         IMAGING: CT ABDOMEN+PELVIS(CONTRAST ONLY)(CPT=74177)    Result Date: 1/26/2025  CONCLUSION:   No evidence of acute abnormality in the abdomen or pelvis.  Cholecystectomy.  Mild dilatation of the common bile duct and slight central intrahepatic biliary ductal dilatation is nonspecific but may reflect post cholecystectomy change.  Advise biliary laboratory correlation and consider further imaging with MRCP as clinically warranted.  Colonic diverticulosis.  Lesser incidental findings as above.    Dictated by (CST): Robin Contreras MD on 1/26/2025 at 6:13 PM     Finalized by (CST): Robin Contreras MD on 1/26/2025 at 6:20 PM          CT CHEST PE AORTA (IV ONLY) (CPT=71260)    Result Date: 1/26/2025  CONCLUSION:   No evidence of pulmonary embolism to the large proximal bilateral segmental arterial level.     Dictated by (CST): Robin Contreras MD on 1/26/2025 at 6:09 PM     Finalized by (CST): Robin Contreras MD on 1/26/2025 at 6:13 PM           I personally reviewed the radiology study and my finding were no free air      Procedures:   Procedures    EKG: Sinus rhythm with a normal rate of 63, normal intervals, normal QRS, nonspecific ST-T wave changes without overt signs of acute ischemia, no old immediately available for comparison     ED COURSE          Re-Evaluation:  improved      Disposition & Plan:   Clinical Impression/Final Diagnosis:   1. Abdominal pain of unknown etiology        Medical Decision Making: Patient quite tender to the epigastrium, LFTs significantly elevated, consulted surgery they recommend admission MRI in the a.m.    Medical Decision Making  Amount and/or Complexity of Data Reviewed  External Data Reviewed: notes.  Labs: ordered. Decision-making details documented in ED Course.  Radiology: ordered and independent interpretation performed. Decision-making details documented in ED Course.  ECG/medicine tests: ordered and independent interpretation performed. Decision-making details documented in ED Course.  Discussion of management or test interpretation with external provider(s): Discussed with the general surgeon Dr. Alarcon who would like MRCP in the a.m.    Risk  OTC drugs.  Prescription drug management.  Decision regarding hospitalization.  Risk Details: Increased risk as the patient is postop laparoscopic cholecystectomy        *Please note that in the presenting to the emergency department, illness/injury that poses a threat to life or function is considered during this patient's initial evaluation.    The complexity of this visit is therefore inherently more complex given the need to consider life threatening pathology prior to any other etiology for this patient's visit.    The medical decision above exemplifies this rationale.     Disposition: Admit  There are no disposition comments on file for this visit.     This note was generated in part using voice recognition dictation technology. The report was reviewed by this physician but still may have unintentional errors due to inherent limitations of voice recognition technology. All times are estimates.         [1] No Known Allergies

## 2025-01-27 NOTE — H&P
[facility]  part of [facility]    History & Physical    Parris Paredes Patient Status:  Observation    1960 MRN [id number]   Location [facility] 4W/SW/SE Attending Tova Loco MD   Hosp Day # 0 PCP Mihaela Nails MD     Date:  2025  Date of Admission:  2025    Chief Complaint:  Chief Complaint   Patient presents with    Abdomen/Flank Pain     Assessment and Plan:    S/p laparoscopic cholecystectomy 24  Epigastric pain  Elevated liver enzymes  -Noted to have significantly elevated liver enzymes with alk phos 346   total bilirubin 2.7, per chart review on discharge  liver enzymes normal.  -CT abdomen pelvis significant for mild dilatation of common bile duct.  This could be related to retained stone, will obtain MRI/MRCP in the a.m.  -General Surgery on consult, appreciate recs  -Maintain n.p.o.  -IV fluid hydration  -Trend LFTs  -Will also obtain hepatitis panel to exclude other etiologies of her elevated LFTs  -Morphine as needed for pain control    Other medical conditions  Hypothyroidism  Anxiety    Prophylaxis  Lovenox    CODE STATUS  Full    History of Present Illness:  Parris Paredes is a(n) 64 year old female, who presents for evaluation of epigastric pain/abdominal pain for the last 2 days.  Patient is status post laparoscopic cholecystectomy 2024 and had unremarkable hospital course.  Patient stated she was discharged right after the procedure and has been doing well.  Then 2 days ago she started experiencing abdominal pain, nonradiating specifically in the epigastric region associated with nausea but no vomiting.  Denies any fever or chills.  Denies any chest pain or shortness of breath.  Not associated with food.  No lightheadedness or dizziness.  Has not been able to keep any intake due to the pain.  Denies any dysuria.  No other complaints.  On presentation to the ED, initial vital signs reveal temp 98.4, heart  rate 75, respiratory 18, blood pressure 155/94 which improved to 130/67, SpO2 98% on room air.  Lab work significant for markedly elevated liver enzymes with alk phos of 346,   total bilirubin 2.7.  Patient underwent imaging with CT chest which did not reveal pulmonary embolism.  She also underwent CT abdomen pelvis which revealed a mild dilation of the common bile duct as well as slight central intrahepatic biliary ductal dilatation, may reflect postcholecystectomy.  Patient received Maalox, Pepcid, morphine, Zofran.  She was admitted under hospitalist service with consultation to general surgery.    History:  Past Medical History:    Anxiety    Thyroid disease     Past Surgical History:   Procedure Laterality Date    Cholecystectomy  2024    laparoscopic cholecystectomy    Knee surgery Bilateral     [facility] biopsy stereo nodule 1 site right (cpt=19081)       Family History   Problem Relation Age of Onset    Hypertension Father     Heart Disease Father     Other (afib) Father          95      reports that she has never smoked. She has never used smokeless tobacco. She reports current alcohol use. She reports that she does not use drugs.    Allergies:  Allergies[1]    Home Medications:  Prior to Admission Medications   Prescriptions Last Dose Informant Patient Reported? Taking?   ALPRAZolam (XANAX) 0.25 MG Oral Tab More than a month  No No   Sig: Take 1 tablet (0.25 mg total) by mouth 2 (two) times daily as needed for Anxiety.   Ibuprofen (ADVIL) 200 MG Oral Cap   No No   Sig: Take 2 capsules (400 mg total) by mouth every 6 (six) hours as needed.   Meloxicam 15 MG Oral Tab Past Month  No Yes   Sig: Take 1 tablet (15 mg total) by mouth daily as needed for Pain.   acetaminophen 500 MG Oral Tab   No No   Sig: Take 2 tablets (1,000 mg total) by mouth every 4 (four) hours as needed for Pain.   levothyroxine 88 MCG Oral Tab 2025 Morning  No Yes   Sig: Take 1 tablet (88 mcg total) by mouth  before breakfast.   oxyCODONE 5 MG Oral Tab   No No   Sig: Take 1 tablet (5 mg total) by mouth every 4 (four) hours as needed for Pain.   predniSONE 20 MG Oral Tab   No No   Sig: Take 1 tablet (20 mg total) by mouth daily.   semaglutide-weight management (WEGOVY) 2.4 MG/0.75ML Subcutaneous Solution Auto-injector 1/13/2025  No No   Sig: Inject 0.75 mL (2.4 mg total) into the skin once a week.      Facility-Administered Medications: None       Review of Systems:  Constitutional: Negative  Eye:  Negative.  Ear/Nose/Mouth/Throat:  Negative.  Respiratory:  Negative  Cardiovascular: Negative  Gastrointestinal: Epigastric pain  Genitourinary:  Negative  Endocrine:  Negative.  Immunologic:  Negative.  Musculoskeletal:  Negative.  Integumentary:  Negative.  Neurologic:  Negative.  Psychiatric:  Negative.  ROS reviewed as documented in chart    Physical Exam:  Temp:  [98.4 °F (36.9 °C)-98.5 °F (36.9 °C)] 98.5 °F (36.9 °C)  Pulse:  [61-80] 80  Resp:  [18] 18  BP: (123-155)/(66-94) 137/77  SpO2:  [98 %] 98 %    General:  Alert and oriented.  Diffuse skin problem:  None.  Eye:  Pupils are equal, round and reactive to light, extraocular movements are intact, Normal conjunctiva.  HENT:  Normocephalic, oral mucosa is moist.  Head:  Normocephalic, atraumatic.  Neck:  Supple, non-tender, no carotid bruit, no jugular venous distention, no lymphadenopathy, no thyromegaly.  Respiratory:  Lungs are clear to auscultation, respirations are non-labored, breath sounds are equal, symmetrical chest wall expansion.  Cardiovascular:  Normal rate, regular rhythm, no murmur, no edema.  Gastrointestinal:  Soft, tender in the epigastric region, non-distended, normal bowel sounds, no organomegaly.  Lymphatics:  No lymphadenopathy neck, axilla, groin.  Musculoskeletal: Normal range of motion.  normal strength.  Feet:  Normal pulses.  Neurologic:  Alert, oriented, no focal deficits, cranial nerves II-XII are grossly intact.  Cognition and Speech:   Oriented, speech clear and coherent.  Psychiatric:  Cooperative, appropriate mood & affect.      Laboratory Data:   Lab Results   Component Value Date    WBC 10.0 01/26/2025    HGB 14.4 01/26/2025    HCT 43.8 01/26/2025    .0 01/26/2025    CREATSERUM 0.80 01/26/2025    BUN 17 01/26/2025     01/26/2025    K 3.9 01/26/2025     01/26/2025    CO2 21.0 01/26/2025     01/26/2025    CA 9.9 01/26/2025    ALB 4.4 01/26/2025    ALKPHO 364 01/26/2025    BILT 2.7 01/26/2025    TP 7.4 01/26/2025     01/26/2025     01/26/2025    LIP 30 01/26/2025       Imaging:  CT ABDOMEN+PELVIS(CONTRAST ONLY)(CPT=74177)    Result Date: 1/26/2025  CONCLUSION:   No evidence of acute abnormality in the abdomen or pelvis.  Cholecystectomy.  Mild dilatation of the common bile duct and slight central intrahepatic biliary ductal dilatation is nonspecific but may reflect post cholecystectomy change.  Advise biliary laboratory correlation and consider further imaging with MRCP as clinically warranted.  Colonic diverticulosis.  Lesser incidental findings as above.    Dictated by (CST): Robin Contreras MD on 1/26/2025 at 6:13 PM     Finalized by (CST): Robin Contreras MD on 1/26/2025 at 6:20 PM          CT CHEST PE AORTA (IV ONLY) (CPT=71260)    Result Date: 1/26/2025  CONCLUSION:   No evidence of pulmonary embolism to the large proximal bilateral segmental arterial level.     Dictated by (CST): Robin Contreras MD on 1/26/2025 at 6:09 PM     Finalized by (CST): Robin Contreras MD on 1/26/2025 at 6:13 PM            Primary care physician  Mihaela Nails MD    60 minutes spent on this admission - examining patient, obtaining history, reviewing previous medical records, going over test results/imaging and discussing plan of care. All questions answered.     Disposition  Clinical course will dictate outcome      Tova Loco MD  1/26/2025  8:11 PM            [1] No Known Allergies

## 2025-01-27 NOTE — CONSULTS
[facility]  Report of Consultation    Parris Paredes Patient Status:  Observation    1960 MRN [id number]   Location [facility] 4W/SW/SE Attending Naa Ratliff MD   Hosp Day # 0 PCP Mihaela Nails MD     Reason for Consultation:  Choledocholithiasis    History of Present Illness:  Parris Paredes is a 64 year old female who presents to [facility] on 25 with 2 days of worsening epigastric pain associated with some nausea. She recently had a laparoscopic cholecystectomy on 24 and was recovering well without complaints until then. No fever, chills, or vomiting. CT A/P and MRCP consistent with choledocholithiasis. , , alk phos 364, total bili 2.7 on arrival.     Past medical history anxiety    Past abdominal surgical history s/p lap tyler on 24    History:  Past Medical History:    Anxiety    Thyroid disease     Past Surgical History:   Procedure Laterality Date    Cholecystectomy  2024    laparoscopic cholecystectomy    Knee surgery Bilateral     [facility] biopsy stereo nodule 1 site right (cpt=19081)       Family History   Problem Relation Age of Onset    Hypertension Father     Heart Disease Father     Other (afib) Father          95      reports that she has never smoked. She has never used smokeless tobacco. She reports current alcohol use. She reports that she does not use drugs.    Allergies:  Allergies[1]    Medications:  Medications Prior to Admission   Medication Sig    Meloxicam 15 MG Oral Tab Take 1 tablet (15 mg total) by mouth daily as needed for Pain.    levothyroxine 88 MCG Oral Tab Take 1 tablet (88 mcg total) by mouth before breakfast.    oxyCODONE 5 MG Oral Tab Take 1 tablet (5 mg total) by mouth every 4 (four) hours as needed for Pain.    acetaminophen 500 MG Oral Tab Take 2 tablets (1,000 mg total) by mouth every 4 (four) hours as needed for Pain.    Ibuprofen (ADVIL) 200 MG Oral Cap Take 2 capsules  (400 mg total) by mouth every 6 (six) hours as needed.    semaglutide-weight management (WEGOVY) 2.4 MG/0.75ML Subcutaneous Solution Auto-injector Inject 0.75 mL (2.4 mg total) into the skin once a week.    predniSONE 20 MG Oral Tab Take 1 tablet (20 mg total) by mouth daily.    ALPRAZolam (XANAX) 0.25 MG Oral Tab Take 1 tablet (0.25 mg total) by mouth 2 (two) times daily as needed for Anxiety.         Current Facility-Administered Medications:     sodium chloride (Saline Mist) 0.65 % nasal solution 1 spray, 1 spray, Each Nare, Q3H PRN    enoxaparin (Lovenox) 40 MG/0.4ML SUBQ injection 40 mg, 40 mg, Subcutaneous, Daily    acetaminophen (Tylenol Extra Strength) tab 500 mg, 500 mg, Oral, Q4H PRN    morphINE PF 2 MG/ML injection 1 mg, 1 mg, Intravenous, Q2H PRN **OR** morphINE PF 2 MG/ML injection 2 mg, 2 mg, Intravenous, Q2H PRN **OR** morphINE PF 4 MG/ML injection 4 mg, 4 mg, Intravenous, Q2H PRN    polyethylene glycol (PEG 3350) (Miralax) 17 g oral packet 17 g, 17 g, Oral, Daily PRN    sennosides (Senokot) tab 17.2 mg, 17.2 mg, Oral, Nightly PRN    bisacodyl (Dulcolax) 10 MG rectal suppository 10 mg, 10 mg, Rectal, Daily PRN    fleet enema (Fleet) rectal enema 133 mL, 1 enema, Rectal, Once PRN    ondansetron (Zofran) 4 MG/2ML injection 4 mg, 4 mg, Intravenous, Q6H PRN    prochlorperazine (Compazine) 10 MG/2ML injection 5 mg, 5 mg, Intravenous, Q8H PRN    pantoprazole (Protonix) 40 mg in sodium chloride 0.9% PF 10 mL IV push, 40 mg, Intravenous, Daily    ALPRAZolam (Xanax) tab 0.25 mg, 0.25 mg, Oral, BID PRN    levothyroxine (Synthroid) tab 88 mcg, 88 mcg, Oral, Daily @ 0700    Review of Systems:  Review of Systems   Constitutional:  Positive for appetite change. Negative for chills, diaphoresis and fever.   Respiratory:  Negative for shortness of breath.    Cardiovascular:  Negative for chest pain.   Gastrointestinal:  Positive for abdominal pain. Negative for nausea, vomiting, diarrhea, constipation and abdominal  distention.   Neurological:  Negative for weakness.       Physical Exam:  /73 (BP Location: Left arm)   Pulse 78   Temp 98.7 °F (37.1 °C) (Oral)   Resp 16   Ht 5' 9\" (1.753 m)   Wt 186 lb 8 oz (84.6 kg)   SpO2 94%   BMI 27.54 kg/m²   Physical Exam  Vitals reviewed.   Constitutional:       General: She is not in acute distress.     Appearance: Normal appearance.   HENT:      Head: Normocephalic and atraumatic.      Right Ear: External ear normal.      Left Ear: External ear normal.      Nose: Nose normal.   Pulmonary:      Effort: Pulmonary effort is normal. No respiratory distress.   Abdominal:      General: There is no distension.      Palpations: Abdomen is soft.      Tenderness: There is no abdominal tenderness. There is no guarding or rebound.   Neurological:      Mental Status: She is alert and oriented to person, place, and time.   Psychiatric:         Mood and Affect: Mood normal.         Behavior: Behavior normal.         Thought Content: Thought content normal.         Judgment: Judgment normal.         Laboratory Data:  Recent Labs   Lab 01/26/25  1631 01/27/25  0813   RBC 4.76 4.25   HGB 14.4 12.9   HCT 43.8 39.3   MCV 92.0 92.5   MCH 30.3 30.4   MCHC 32.9 32.8   RDW 12.4 12.8   NEPRELIM 7.82* 6.06   WBC 10.0 7.7   .0 221.0       Recent Labs   Lab 01/26/25  1631 01/27/25  0813   * 74   BUN 17 16   CREATSERUM 0.80 0.74   CA 9.9 9.3   ALB 4.4 3.8    140   K 3.9 4.0    106   CO2 21.0 21.0   ALKPHO 364* 384*   * 880*   * 824*   BILT 2.7* 2.5*   TP 7.4 6.5         No results for input(s): \"PTP\", \"INR\", \"PTT\" in the last 168 hours.      MRI ABDOMEN AND MRCP W/3D (CPT=74181/80399)    Result Date: 1/27/2025  CONCLUSION:   Choledocholithiasis with a 0.5 cm stone within the mid to distal common bile duct.  Mild biliary ductal dilation.    Dictated by (CST): Jeovany Maldonado MD on 1/27/2025 at 7:23 AM     Finalized by (CST): Jeovany Maldonado MD on 1/27/2025 at 7:29 AM           CT ABDOMEN+PELVIS(CONTRAST ONLY)(CPT=74177)    Result Date: 1/26/2025  CONCLUSION:   No evidence of acute abnormality in the abdomen or pelvis.  Cholecystectomy.  Mild dilatation of the common bile duct and slight central intrahepatic biliary ductal dilatation is nonspecific but may reflect post cholecystectomy change.  Advise biliary laboratory correlation and consider further imaging with MRCP as clinically warranted.  Colonic diverticulosis.  Lesser incidental findings as above.    Dictated by (CST): Robin Contreras MD on 1/26/2025 at 6:13 PM     Finalized by (CST): Robin Contreras MD on 1/26/2025 at 6:20 PM          CT CHEST PE AORTA (IV ONLY) (CPT=71260)    Result Date: 1/26/2025  CONCLUSION:   No evidence of pulmonary embolism to the large proximal bilateral segmental arterial level.     Dictated by (CST): Robin Contreras MD on 1/26/2025 at 6:09 PM     Finalized by (CST): Robin Contreras MD on 1/26/2025 at 6:13 PM                     Medical Decision Making         Impression:  Patient Active Problem List   Diagnosis    Diverticulosis of colon    Arthritis of left shoulder region    Rotator cuff tendonitis, left    Biceps tendonitis, left    Arthritis of shoulder region, left    Impingement syndrome of left shoulder    Obesity (BMI 30-39.9)    Hypothyroidism    Dyslipidemia    Anxiety    Encounter for therapeutic drug monitoring    Acute cholecystitis    Azotemia    Metabolic acidosis    Hyperglycemia    Abdominal pain of unknown etiology     Tranaminitis with possible choledocholithiasis s/p laparoscopic cholecystectomy on 12/28/24    Plan:  No acute surgical intervention indicated at this time.   GI consulted for possible ERCP if needed  Analgesics and antiemetics as needed  Keep NPO in anticipation of possible GI procedure today, otherwise okay for diet from surgical standpoint as tolerated  DVT prophylaxis with lovenox  GI prophylaxis with protonix  Patient seen with Dr. Mayo who can provide  additional recommendations as needed.  Nir Carney PA-C  1/27/2025  10:27 AM    Is this a shared or split note between Advanced Practice Provider and Physician? Yes        The patient was independently examined. Agree with the PA's assessment and plan. Labs and imaging were independently reviewed. MRCP with 0.5 cm obstructing stone. Pain is resolved now. Will ask GI to evaluate for ERCP. NPO while awaiting their evaluation. The patient was updated on the plan.       More than 50% of clinical time and 100% MDM was performed by me.     Deepali Mayo MD  [facility] - General Surgery   [address] [place] [address]  p [phone]          [1] No Known Allergies

## 2025-01-27 NOTE — ED QUICK NOTES
Orders for admission, patient is aware of plan and ready to go upstairs. Any questions, please call ED RN John at extension [phone].     Patient Covid vaccination status: Fully vaccinated     COVID Test Ordered in ED: None    COVID Suspicion at Admission: N/A    Running Infusions:  None    Mental Status/LOC at time of transport: a/ox4    Other pertinent information:   CIWA score: N/A   NIH score:  N/A

## 2025-01-27 NOTE — PLAN OF CARE
Problem: GASTROINTESTINAL - ADULT  Goal: Minimal or absence of nausea and vomiting  Description: INTERVENTIONS:  - Maintain adequate hydration with IV or PO as ordered and tolerated  - Nasogastric tube to low intermittent suction as ordered  - Evaluate effectiveness of ordered antiemetic medications  - Provide nonpharmacologic comfort measures as appropriate  - Advance diet as tolerated, if ordered  - Obtain nutritional consult as needed  - Evaluate fluid balance  Outcome: Progressing     Problem: PAIN - ADULT  Goal: Verbalizes/displays adequate comfort level or patient's stated pain goal  Description: INTERVENTIONS:  - Encourage pt to monitor pain and request assistance  - Assess pain using appropriate pain scale  - Administer analgesics based on type and severity of pain and evaluate response  - Implement non-pharmacological measures as appropriate and evaluate response  - Consider cultural and social influences on pain and pain management  - Manage/alleviate anxiety  - Utilize distraction and/or relaxation techniques  - Monitor for opioid side effects  - Notify MD/LIP if interventions unsuccessful or patient reports new pain  - Anticipate increased pain with activity and pre-medicate as appropriate  Outcome: Progressing     Problem: RISK FOR INFECTION - ADULT  Goal: Absence of fever/infection during anticipated neutropenic period  Description: INTERVENTIONS  - Monitor WBC  - Administer growth factors as ordered  - Implement neutropenic guidelines  Outcome: Progressing     Problem: DISCHARGE PLANNING  Goal: Discharge to home or other facility with appropriate resources  Description: INTERVENTIONS:  - Identify barriers to discharge w/pt and caregiver  - Include patient/family/discharge partner in discharge planning  - Arrange for needed discharge resources and transportation as appropriate  - Identify discharge learning needs (meds, wound care, etc)  - Arrange for interpreters to assist at discharge as needed  -  Consider post-discharge preferences of patient/family/discharge partner  - Complete POLST form as appropriate  - Assess patient's ability to be responsible for managing their own health  - Refer to Case Management Department for coordinating discharge planning if the patient needs post-hospital services based on physician/LIP order or complex needs related to functional status, cognitive ability or social support system  Outcome: Progressing   Pt alert and oriented, independent,npo now pt went for MRI and plan for ERCP this evening. No c/o pain at this time and no nausea at this time. Ivf continuing as ordered. Not in any kind of distress, call light with in reach and she knows to call for assistance

## 2025-01-27 NOTE — ANESTHESIA POSTPROCEDURE EVALUATION
Patient: Parris Paredes    Procedure Summary       Date: 01/27/25 Room / Location: [facility] ENDOSCOPY 01 / [facility] ENDOSCOPY    Anesthesia Start: 1720 Anesthesia Stop: 1756    Procedure: ENDOSCOPIC RETROGRADE CHOLANGIOPANCREATOGRAPHY (ERCP) Diagnosis: (choledocholithiasis)    Surgeons: Chaitanya Vasquez MD Anesthesiologist: Teri Nowak MD    Anesthesia Type: general ASA Status: 2            Anesthesia Type: general    Vitals Value Taken Time   BP 85/73 01/27/25 1756   Temp 98.5 °F (36.9 °C) 01/27/25 1756   Pulse 89 01/27/25 1756   Resp 14 01/27/25 1756   SpO2 99 % 01/27/25 1756   Vitals shown include unfiled device data.    [facility] AN Post Evaluation:   Patient Evaluated in PACU  Patient Participation: complete - patient participated  Level of Consciousness: awake  Pain Score: 0  Pain Management: adequate  Airway Patency:patent  Yes    Nausea/Vomiting: none  Cardiovascular Status: acceptable  Respiratory Status: acceptable  Postoperative Hydration acceptable      Teri Nowak MD  1/27/2025 5:56 PM

## 2025-01-27 NOTE — ANESTHESIA PREPROCEDURE EVALUATION
Anesthesia PreOp Note    HPI:     Parris Paredes is a 64 year old female who presents for preoperative consultation requested by: Chaitanya Vasquez MD    Date of Surgery: 2025 - 2025    Procedure(s):  ENDOSCOPIC RETROGRADE CHOLANGIOPANCREATOGRAPHY (ERCP)  Indication: choledocholithiasis    Relevant Problems   No relevant active problems       NPO:  Last Liquid Consumption Date: 25  Last Liquid Consumption Time: 1210  Last Solid Consumption Date: 25  Last Solid Consumption Time: 1800  Last Liquid Consumption Date: 25          History Review:  Patient Active Problem List    Diagnosis Date Noted    Choledocholithiasis 2025    Azotemia 2025    Metabolic acidosis 2025    Hyperglycemia 2025    Abdominal pain of unknown etiology 2025    Acute cholecystitis 2024    Encounter for therapeutic drug monitoring 2023    Obesity (BMI 30-39.9) 2023    Hypothyroidism 2023    Dyslipidemia 2023    Anxiety 2023    Rotator cuff tendonitis, left 2017    Biceps tendonitis, left 2017    Arthritis of shoulder region, left 2017    Impingement syndrome of left shoulder 2017    Arthritis of left shoulder region 2017    Diverticulosis of colon 2016       Past Medical History:    Anxiety    Disorder of thyroid    Thyroid disease    Visual impairment       Past Surgical History:   Procedure Laterality Date    Cholecystectomy  2024    laparoscopic cholecystectomy    Knee surgery Bilateral     Faviola biopsy stereo nodule 1 site right (cpt=19081)         Prescriptions Prior to Admission[1]  Current Medications and Prescriptions Ordered in Epic[2]    Allergies[3]    Family History   Problem Relation Age of Onset    Hypertension Father     Heart Disease Father     Other (afib) Father          95     Social History     Socioeconomic History    Marital status:    Tobacco Use    Smoking status: Never     Smokeless tobacco: Never   Vaping Use    Vaping status: Never Used   Substance and Sexual Activity    Alcohol use: Yes     Alcohol/week: 0.0 standard drinks of alcohol     Comment: Socially     Drug use: No   Other Topics Concern    Caffeine Concern Yes     Comment: Coffee, soda - 4 cups per day        Available pre-op labs reviewed.  Lab Results   Component Value Date    WBC 7.7 01/27/2025    RBC 4.25 01/27/2025    HGB 12.9 01/27/2025    HCT 39.3 01/27/2025    MCV 92.5 01/27/2025    MCH 30.4 01/27/2025    MCHC 32.8 01/27/2025    RDW 12.8 01/27/2025    .0 01/27/2025     Lab Results   Component Value Date     01/27/2025    K 4.0 01/27/2025     01/27/2025    CO2 21.0 01/27/2025    BUN 16 01/27/2025    CREATSERUM 0.74 01/27/2025    GLU 74 01/27/2025    CA 9.3 01/27/2025          Vital Signs:  Body mass index is 27.54 kg/m².   height is 1.753 m (5' 9\") and weight is 84.6 kg (186 lb 8 oz). Her oral temperature is 99.5 °F (37.5 °C). Her blood pressure is 124/65 and her pulse is 87. Her respiration is 24 and oxygen saturation is 97%.   Vitals:    01/27/25 0421 01/27/25 0730 01/27/25 1628 01/27/25 1658   BP: 110/66 113/73 124/65    Pulse: 78  92 87   Resp: 16 16 16 24   Temp: 98.1 °F (36.7 °C) 98.7 °F (37.1 °C) 99.5 °F (37.5 °C)    TempSrc: Temporal Oral Oral    SpO2: 96% 94% 97%    Weight:       Height:            Anesthesia Evaluation     Patient summary reviewed and Nursing notes reviewed    Airway   Mallampati: II  TM distance: <3 FB  Neck ROM: full  Dental      Pulmonary     breath sounds clear to auscultation  Cardiovascular     Rhythm: regular    Neuro/Psych    (+)  anxiety/panic attacks,        GI/Hepatic/Renal      Endo/Other    Abdominal   (+) obese                 Anesthesia Plan:   ASA:  2  Plan:   General  Airway:  ETT      I have informed Parris Paredes and/or legal guardian or family member of the nature of the anesthetic plan, benefits, risks including possible dental damage if  relevant, major complications, and any alternative forms of anesthetic management.   All of the patient's questions were answered to the best of my ability. The patient desires the anesthetic management as planned.  Teri Nowak MD  1/27/2025 5:19 PM  Present on Admission:   Azotemia   Metabolic acidosis   Hyperglycemia           [1]   Medications Prior to Admission   Medication Sig Dispense Refill Last Dose/Taking    Meloxicam 15 MG Oral Tab Take 1 tablet (15 mg total) by mouth daily as needed for Pain. 30 tablet 0 Past Month    levothyroxine 88 MCG Oral Tab Take 1 tablet (88 mcg total) by mouth before breakfast. 90 tablet 3 1/25/2025 Morning    oxyCODONE 5 MG Oral Tab Take 1 tablet (5 mg total) by mouth every 4 (four) hours as needed for Pain. 5 tablet 0     acetaminophen 500 MG Oral Tab Take 2 tablets (1,000 mg total) by mouth every 4 (four) hours as needed for Pain. 30 tablet 0     Ibuprofen (ADVIL) 200 MG Oral Cap Take 2 capsules (400 mg total) by mouth every 6 (six) hours as needed. 120 capsule 0     semaglutide-weight management (WEGOVY) 2.4 MG/0.75ML Subcutaneous Solution Auto-injector Inject 0.75 mL (2.4 mg total) into the skin once a week. 9 mL 1 1/13/2025    predniSONE 20 MG Oral Tab Take 1 tablet (20 mg total) by mouth daily. 5 tablet 0     ALPRAZolam (XANAX) 0.25 MG Oral Tab Take 1 tablet (0.25 mg total) by mouth 2 (two) times daily as needed for Anxiety. 20 tablet 0 More than a month   [2]   Current Facility-Administered Medications Ordered in Epic   Medication Dose Route Frequency Provider Last Rate Last Admin    sodium chloride (Saline Mist) 0.65 % nasal solution 1 spray  1 spray Each Nare Q3H PRN Naa Ratliff MD   1 spray at 01/27/25 0925    sodium chloride 0.9% infusion   Intravenous Continuous Naa Ratliff  mL/hr at 01/27/25 1219 New Bag at 01/27/25 1219    acetaminophen (Tylenol Extra Strength) tab 500 mg  500 mg Oral Q4H PRN Tova Loco MD   500 mg at 01/27/25 0024     morphINE PF 2 MG/ML injection 1 mg  1 mg Intravenous Q2H PRN Tova Loco MD        Or    morphINE PF 2 MG/ML injection 2 mg  2 mg Intravenous Q2H PRN Tova Loco MD        Or    morphINE PF 4 MG/ML injection 4 mg  4 mg Intravenous Q2H PRN Tova Loco MD        polyethylene glycol (PEG 3350) (Miralax) 17 g oral packet 17 g  17 g Oral Daily PRN Tova Loco MD        sennosides (Senokot) tab 17.2 mg  17.2 mg Oral Nightly PRN Tova Loco MD        bisacodyl (Dulcolax) 10 MG rectal suppository 10 mg  10 mg Rectal Daily PRN Tova Loco MD        fleet enema (Fleet) rectal enema 133 mL  1 enema Rectal Once PRN Tova Loco MD        ondansetron (Zofran) 4 MG/2ML injection 4 mg  4 mg Intravenous Q6H PRN Tova Loco MD   4 mg at 01/27/25 0024    prochlorperazine (Compazine) 10 MG/2ML injection 5 mg  5 mg Intravenous Q8H PRN Tova Loco MD        pantoprazole (Protonix) 40 mg in sodium chloride 0.9% PF 10 mL IV push  40 mg Intravenous Daily Tova Loco MD   40 mg at 01/27/25 0828    ALPRAZolam (Xanax) tab 0.25 mg  0.25 mg Oral BID PRN Tova Loco MD   0.25 mg at 01/27/25 0544    levothyroxine (Synthroid) tab 88 mcg  88 mcg Oral Daily @ 0700 Tova Loco MD   88 mcg at 01/27/25 0533     No current Epic-ordered outpatient medications on file.   [3] No Known Allergies

## 2025-01-27 NOTE — ANESTHESIA PROCEDURE NOTES
Airway  Date/Time: 1/27/2025 5:23 PM  Urgency: elective      General Information and Staff    Patient location during procedure: OR  Anesthesiologist: Teri Nowak MD  Performed: anesthesiologist   Performed by: Teri Nowak MD  Authorized by: Teri Nowak MD      Indications and Patient Condition  Indications for airway management: anesthesia  Sedation level: deep  Preoxygenated: yes  Patient position: sniffing  Mask difficulty assessment: 1 - vent by mask    Final Airway Details  Final airway type: endotracheal airway      Successful airway: ETT  Cuffed: yes   Successful intubation technique: direct laryngoscopy  Endotracheal tube insertion site: oral    Placement verified by: capnometry   Measured from: lips  ETT to lips (cm): 22  Number of attempts at approach: 1

## 2025-01-27 NOTE — CONSULTS
[facility]   Gastroenterology Consultation Note    Parris Paredes  Patient Status:    Observation  Date of Admission:         2025, Hospital day #0  Attending:   Naa Ratliff MD  PCP:     Mihaela Nails MD    Reason for Consultation:  Choledocholithiasis     History of Present Illness:  Parris Paredes is a a(n) 64 year old female w/ a history of thyroid disease, history of cholecystectomy , who presents with right upper quadrant abdominal pain, epigastric pain associated with nausea.  Felt similar to pain that prompted cholecystectomy recently.  No fevers or chills.  No melena or hematochezia.  No diarrhea.  She continues to have mild pain this morning.    History:  Past Medical History:    Anxiety    Thyroid disease     Past Surgical History:   Procedure Laterality Date    Cholecystectomy  2024    laparoscopic cholecystectomy    Knee surgery Bilateral     Faviola biopsy stereo nodule 1 site right (cpt=19081)       Family History   Problem Relation Age of Onset    Hypertension Father     Heart Disease Father     Other (afib) Father          95      reports that she has never smoked. She has never used smokeless tobacco. She reports current alcohol use. She reports that she does not use drugs.    Allergies:  Allergies[1]    Medications:    Current Facility-Administered Medications:     sodium chloride (Saline Mist) 0.65 % nasal solution 1 spray, 1 spray, Each Nare, Q3H PRN    sodium chloride 0.9% infusion, , Intravenous, Continuous    acetaminophen (Tylenol Extra Strength) tab 500 mg, 500 mg, Oral, Q4H PRN    morphINE PF 2 MG/ML injection 1 mg, 1 mg, Intravenous, Q2H PRN **OR** morphINE PF 2 MG/ML injection 2 mg, 2 mg, Intravenous, Q2H PRN **OR** morphINE PF 4 MG/ML injection 4 mg, 4 mg, Intravenous, Q2H PRN    polyethylene glycol (PEG 3350) (Miralax) 17 g oral packet 17 g, 17 g, Oral, Daily PRN    sennosides (Senokot) tab 17.2 mg, 17.2 mg, Oral, Nightly  PRN    bisacodyl (Dulcolax) 10 MG rectal suppository 10 mg, 10 mg, Rectal, Daily PRN    fleet enema (Fleet) rectal enema 133 mL, 1 enema, Rectal, Once PRN    ondansetron (Zofran) 4 MG/2ML injection 4 mg, 4 mg, Intravenous, Q6H PRN    prochlorperazine (Compazine) 10 MG/2ML injection 5 mg, 5 mg, Intravenous, Q8H PRN    pantoprazole (Protonix) 40 mg in sodium chloride 0.9% PF 10 mL IV push, 40 mg, Intravenous, Daily    ALPRAZolam (Xanax) tab 0.25 mg, 0.25 mg, Oral, BID PRN    levothyroxine (Synthroid) tab 88 mcg, 88 mcg, Oral, Daily @ 0700  Prescriptions Prior to Admission[2]    Review of Systems:  CONSTITUTIONAL:  negative for fevers, rigors  EYES:  negative for diplopia   RESPIRATORY:  negative for severe shortness of breath  CARDIOVASCULAR:  negative for crushing sub-sternal chest pain  GASTROINTESTINAL:  see HPI  GENITOURINARY:  negative for dysuria or gross hematuria  INTEGUMENT/BREAST:  SKIN:  negative for jaundice   ALLERGIC/IMMUNOLOGIC:  negative for hay fever  ENDOCRINE:  negative for cold intolerance and heat intolerance  MUSCULOSKELETAL:  negative for joint effusion/severe erythema  NEURO: negative for dizziness or loss of conscious or paresthesias  BEHAVIOR/PSYCH:  negative for psychotic behavior    Physical Exam:    Blood pressure 113/73, pulse 78, temperature 98.7 °F (37.1 °C), temperature source Oral, resp. rate 16, height 5' 9\" (1.753 m), weight 186 lb 8 oz (84.6 kg), SpO2 94%. Body mass index is 27.54 kg/m².    General: awake, alert and oriented, no acute distress  HEENT: moist mucus membranes  PULM: no conversational dyspnea  CARDIOVASCULAR: regular rate and rhythm, the extremities are warm and well perfused  GI: soft, non-tender, non-distended, + BS, no rebound/guarding   EXTREMITIES: no edema, moving all extremities  SKIN: no visible rash  NEURO: appropriate and interactive    Laboratory Data:  Lab Results   Component Value Date    WBC 7.7 01/27/2025    HGB 12.9 01/27/2025    HCT 39.3 01/27/2025     .0 01/27/2025    CREATSERUM 0.74 01/27/2025    BUN 16 01/27/2025     01/27/2025    K 4.0 01/27/2025     01/27/2025    CO2 21.0 01/27/2025    GLU 74 01/27/2025    CA 9.3 01/27/2025    ALB 3.8 01/27/2025    ALKPHO 384 01/27/2025    BILT 2.5 01/27/2025    TP 6.5 01/27/2025     01/27/2025     01/27/2025    LIP 30 01/26/2025       Imaging:  MRI ABDOMEN AND MRCP W/3D (CPT=74181/04273)    Result Date: 1/27/2025  CONCLUSION:   Choledocholithiasis with a 0.5 cm stone within the mid to distal common bile duct.  Mild biliary ductal dilation.    Dictated by (CST): Jeovany Maldonado MD on 1/27/2025 at 7:23 AM     Finalized by (CST): Jeovany Maldonado MD on 1/27/2025 at 7:29 AM          CT ABDOMEN+PELVIS(CONTRAST ONLY)(CPT=74177)    Result Date: 1/26/2025  CONCLUSION:   No evidence of acute abnormality in the abdomen or pelvis.  Cholecystectomy.  Mild dilatation of the common bile duct and slight central intrahepatic biliary ductal dilatation is nonspecific but may reflect post cholecystectomy change.  Advise biliary laboratory correlation and consider further imaging with MRCP as clinically warranted.  Colonic diverticulosis.  Lesser incidental findings as above.    Dictated by (CST): Robin Contreras MD on 1/26/2025 at 6:13 PM     Finalized by (CST): Robin Contreras MD on 1/26/2025 at 6:20 PM          CT CHEST PE AORTA (IV ONLY) (CPT=71260)    Result Date: 1/26/2025  CONCLUSION:   No evidence of pulmonary embolism to the large proximal bilateral segmental arterial level.     Dictated by (CST): Robin Contreras MD on 1/26/2025 at 6:09 PM     Finalized by (CST): Robin Contreras MD on 1/26/2025 at 6:13 PM           Assessment & Plan   Parris Paredes is a a(n) 64 year old female w/ a history of thyroid disease, history of cholecystectomy December 28, who presents with ruq abdominal pain.  Labs notable for total bilirubin 2.5, , , alkaline phosphatase 384.  MRCP shows choledocholithiasis  with a persistent ductal stone and associated biliary ductal dilation.  No signs of cholangitis.    Recommend:  -Plan ERCP with Dr. Vasquez this afternoon  -Trend LFTs  -NPO for now    Annette Merino MD  [facility]  1/27/2025    This note was partially prepared using Dragon Medical voice recognition dictation software. As a result, errors may occur. When identified, these errors have been corrected. While every attempt is made to correct errors during dictation, discrepancies may still exist.          [1] No Known Allergies  [2]   Medications Prior to Admission   Medication Sig Dispense Refill Last Dose/Taking    Meloxicam 15 MG Oral Tab Take 1 tablet (15 mg total) by mouth daily as needed for Pain. 30 tablet 0 Past Month    levothyroxine 88 MCG Oral Tab Take 1 tablet (88 mcg total) by mouth before breakfast. 90 tablet 3 1/25/2025 Morning    oxyCODONE 5 MG Oral Tab Take 1 tablet (5 mg total) by mouth every 4 (four) hours as needed for Pain. 5 tablet 0     acetaminophen 500 MG Oral Tab Take 2 tablets (1,000 mg total) by mouth every 4 (four) hours as needed for Pain. 30 tablet 0     Ibuprofen (ADVIL) 200 MG Oral Cap Take 2 capsules (400 mg total) by mouth every 6 (six) hours as needed. 120 capsule 0     semaglutide-weight management (WEGOVY) 2.4 MG/0.75ML Subcutaneous Solution Auto-injector Inject 0.75 mL (2.4 mg total) into the skin once a week. 9 mL 1 1/13/2025    predniSONE 20 MG Oral Tab Take 1 tablet (20 mg total) by mouth daily. 5 tablet 0     ALPRAZolam (XANAX) 0.25 MG Oral Tab Take 1 tablet (0.25 mg total) by mouth 2 (two) times daily as needed for Anxiety. 20 tablet 0 More than a month

## 2025-01-27 NOTE — H&P
History & Physical Examination    Patient Name: Parris Paredes  MRN: [id number]  CSN: [id number]  YOB: 1960    Diagnosis: upper abdominal pain, abnormal MRCP, choledocholithiasis    S/p cholecystectomy 25    Prescriptions Prior to Admission[1]  Current Facility-Administered Medications   Medication Dose Route Frequency    sodium chloride (Saline Mist) 0.65 % nasal solution 1 spray  1 spray Each Nare Q3H PRN    sodium chloride 0.9% infusion   Intravenous Continuous    acetaminophen (Tylenol Extra Strength) tab 500 mg  500 mg Oral Q4H PRN    morphINE PF 2 MG/ML injection 1 mg  1 mg Intravenous Q2H PRN    Or    morphINE PF 2 MG/ML injection 2 mg  2 mg Intravenous Q2H PRN    Or    morphINE PF 4 MG/ML injection 4 mg  4 mg Intravenous Q2H PRN    polyethylene glycol (PEG 3350) (Miralax) 17 g oral packet 17 g  17 g Oral Daily PRN    sennosides (Senokot) tab 17.2 mg  17.2 mg Oral Nightly PRN    bisacodyl (Dulcolax) 10 MG rectal suppository 10 mg  10 mg Rectal Daily PRN    fleet enema (Fleet) rectal enema 133 mL  1 enema Rectal Once PRN    ondansetron (Zofran) 4 MG/2ML injection 4 mg  4 mg Intravenous Q6H PRN    prochlorperazine (Compazine) 10 MG/2ML injection 5 mg  5 mg Intravenous Q8H PRN    pantoprazole (Protonix) 40 mg in sodium chloride 0.9% PF 10 mL IV push  40 mg Intravenous Daily    ALPRAZolam (Xanax) tab 0.25 mg  0.25 mg Oral BID PRN    levothyroxine (Synthroid) tab 88 mcg  88 mcg Oral Daily @ 0700       Allergies: Allergies[2]    Past Medical History:    Anxiety    Disorder of thyroid    Thyroid disease    Visual impairment     Past Surgical History:   Procedure Laterality Date    Cholecystectomy  2024    laparoscopic cholecystectomy    Knee surgery Bilateral     Faviola biopsy stereo nodule 1 site right (cpt=19081)       Family History   Problem Relation Age of Onset    Hypertension Father     Heart Disease Father     Other (afib) Father          95     Social History     Tobacco  Use    Smoking status: Never    Smokeless tobacco: Never   Substance Use Topics    Alcohol use: Yes     Alcohol/week: 0.0 standard drinks of alcohol     Comment: Socially        SYSTEM Check if Physical Exam is Normal If not normal, please explain:   HEENT [ X]    NECK  [ X]    HEART [ X]    LUNGS [ X]    ABDOMEN [ X]    EXTREMITIES [ X]      General:awake, cooperative, no acute distress  HEENT: EOMI, no scleral icterus, MMM; oral pharnyx is without exudates or lesions  Neck: no lymphadenopathy; thyroid is not enlarged and without nodules  CV: RRR  Resp: non-labored breathing  Abd: soft, non-tender, non-distended  Ext: no lower extremity swelling  Neuro: Alert, Oriented X 3  Skin: no rashes, bruises  Psych: normal affect  I have discussed the risks and benefits and alternatives of the procedure with the patient/family.  She understands and agreed to proceed with plan of care.   ERCP Consent: I have discussed the risks, benefits, and alternatives to ERCP with the patient [who demonstrated understanding], including but not limited to the risks of bleeding, infection, pain, perforation, anesthesia related cardiopulmonary complications, and pancreatitis all requiring or leading to prolonged hospital stay, surgical intervention, and/or be life threatening. I also mentioned the available alternatives including imaging modalities such as MRCP and EUS as well as alternative therapeutic approaches such as interventional radiology guided approaches or surgery. We also discussed that if a stent (pancreatic or biliary) is placed, it could require a repeat endoscopic procedure in the future. All questions were answered to the patient’s satisfaction. The patient elected to proceed with ERCP with intervention [i.e. stent, sphincterotomy, etc.] as indicated.  I have reviewed the History and Physical done within the last 30 days.  Any changes noted above.  Chaitanya Vasquez MD  [facility]enter[facility]  1/27/2025  5:20  PM                   [1]   Medications Prior to Admission   Medication Sig Dispense Refill Last Dose/Taking    Meloxicam 15 MG Oral Tab Take 1 tablet (15 mg total) by mouth daily as needed for Pain. 30 tablet 0 Past Month    levothyroxine 88 MCG Oral Tab Take 1 tablet (88 mcg total) by mouth before breakfast. 90 tablet 3 1/25/2025 Morning    oxyCODONE 5 MG Oral Tab Take 1 tablet (5 mg total) by mouth every 4 (four) hours as needed for Pain. 5 tablet 0     acetaminophen 500 MG Oral Tab Take 2 tablets (1,000 mg total) by mouth every 4 (four) hours as needed for Pain. 30 tablet 0     Ibuprofen (ADVIL) 200 MG Oral Cap Take 2 capsules (400 mg total) by mouth every 6 (six) hours as needed. 120 capsule 0     semaglutide-weight management (WEGOVY) 2.4 MG/0.75ML Subcutaneous Solution Auto-injector Inject 0.75 mL (2.4 mg total) into the skin once a week. 9 mL 1 1/13/2025    predniSONE 20 MG Oral Tab Take 1 tablet (20 mg total) by mouth daily. 5 tablet 0     ALPRAZolam (XANAX) 0.25 MG Oral Tab Take 1 tablet (0.25 mg total) by mouth 2 (two) times daily as needed for Anxiety. 20 tablet 0 More than a month   [2] No Known Allergies

## 2025-01-28 VITALS
BODY MASS INDEX: 27.62 KG/M2 | OXYGEN SATURATION: 97 % | TEMPERATURE: 97 F | RESPIRATION RATE: 16 BRPM | HEIGHT: 69 IN | WEIGHT: 186.5 LBS | HEART RATE: 61 BPM | DIASTOLIC BLOOD PRESSURE: 65 MMHG | SYSTOLIC BLOOD PRESSURE: 118 MMHG

## 2025-01-28 LAB
ALBUMIN SERPL-MCNC: 3.5 G/DL (ref 3.2–4.8)
ALBUMIN/GLOB SERPL: 1.4 {RATIO} (ref 1–2)
ALP LIVER SERPL-CCNC: 396 U/L
ALT SERPL-CCNC: 683 U/L
ANION GAP SERPL CALC-SCNC: 9 MMOL/L (ref 0–18)
ANION GAP SERPL CALC-SCNC: 9 MMOL/L (ref 0–18)
AST SERPL-CCNC: 526 U/L (ref ?–34)
BASOPHILS # BLD AUTO: 0.03 X10(3) UL (ref 0–0.2)
BASOPHILS NFR BLD AUTO: 0.5 %
BILIRUB SERPL-MCNC: 2.2 MG/DL (ref 0.2–1.1)
BUN BLD-MCNC: 13 MG/DL (ref 9–23)
BUN BLD-MCNC: 13 MG/DL (ref 9–23)
BUN/CREAT SERPL: 17.1 (ref 10–20)
BUN/CREAT SERPL: 17.1 (ref 10–20)
CALCIUM BLD-MCNC: 8.8 MG/DL (ref 8.7–10.4)
CALCIUM BLD-MCNC: 8.8 MG/DL (ref 8.7–10.4)
CHLORIDE SERPL-SCNC: 110 MMOL/L (ref 98–112)
CHLORIDE SERPL-SCNC: 110 MMOL/L (ref 98–112)
CO2 SERPL-SCNC: 23 MMOL/L (ref 21–32)
CO2 SERPL-SCNC: 23 MMOL/L (ref 21–32)
CREAT BLD-MCNC: 0.76 MG/DL
CREAT BLD-MCNC: 0.76 MG/DL
DEPRECATED RDW RBC AUTO: 42.5 FL (ref 35.1–46.3)
EGFRCR SERPLBLD CKD-EPI 2021: 89 ML/MIN/1.73M2 (ref 60–?)
EGFRCR SERPLBLD CKD-EPI 2021: 89 ML/MIN/1.73M2 (ref 60–?)
EOSINOPHIL # BLD AUTO: 0.54 X10(3) UL (ref 0–0.7)
EOSINOPHIL NFR BLD AUTO: 8.6 %
ERYTHROCYTE [DISTWIDTH] IN BLOOD BY AUTOMATED COUNT: 12.8 % (ref 11–15)
GLOBULIN PLAS-MCNC: 2.5 G/DL (ref 2–3.5)
GLUCOSE BLD-MCNC: 91 MG/DL (ref 70–99)
GLUCOSE BLD-MCNC: 91 MG/DL (ref 70–99)
HCT VFR BLD AUTO: 35.6 %
HGB BLD-MCNC: 11.9 G/DL
IMM GRANULOCYTES # BLD AUTO: 0.01 X10(3) UL (ref 0–1)
IMM GRANULOCYTES NFR BLD: 0.2 %
LYMPHOCYTES # BLD AUTO: 1.21 X10(3) UL (ref 1–4)
LYMPHOCYTES NFR BLD AUTO: 19.2 %
MCH RBC QN AUTO: 30.4 PG (ref 26–34)
MCHC RBC AUTO-ENTMCNC: 33.4 G/DL (ref 31–37)
MCV RBC AUTO: 91 FL
MONOCYTES # BLD AUTO: 0.72 X10(3) UL (ref 0.1–1)
MONOCYTES NFR BLD AUTO: 11.4 %
NEUTROPHILS # BLD AUTO: 3.78 X10 (3) UL (ref 1.5–7.7)
NEUTROPHILS # BLD AUTO: 3.78 X10(3) UL (ref 1.5–7.7)
NEUTROPHILS NFR BLD AUTO: 60.1 %
OSMOLALITY SERPL CALC.SUM OF ELEC: 294 MOSM/KG (ref 275–295)
OSMOLALITY SERPL CALC.SUM OF ELEC: 294 MOSM/KG (ref 275–295)
PLATELET # BLD AUTO: 218 10(3)UL (ref 150–450)
POTASSIUM SERPL-SCNC: 3.9 MMOL/L (ref 3.5–5.1)
POTASSIUM SERPL-SCNC: 3.9 MMOL/L (ref 3.5–5.1)
PROT SERPL-MCNC: 6 G/DL (ref 5.7–8.2)
RBC # BLD AUTO: 3.91 X10(6)UL
SODIUM SERPL-SCNC: 142 MMOL/L (ref 136–145)
SODIUM SERPL-SCNC: 142 MMOL/L (ref 136–145)
WBC # BLD AUTO: 6.3 X10(3) UL (ref 4–11)

## 2025-01-28 PROCEDURE — 99239 HOSP IP/OBS DSCHRG MGMT >30: CPT | Performed by: HOSPITALIST

## 2025-01-28 PROCEDURE — 99233 SBSQ HOSP IP/OBS HIGH 50: CPT | Performed by: INTERNAL MEDICINE

## 2025-01-28 RX ORDER — HYDROCODONE BITARTRATE AND ACETAMINOPHEN 5; 325 MG/1; MG/1
1 TABLET ORAL EVERY 6 HOURS PRN
Qty: 10 TABLET | Refills: 0 | Status: SHIPPED | OUTPATIENT
Start: 2025-01-28 | End: 2025-01-28

## 2025-01-28 NOTE — PROGRESS NOTES
[facility]  Progress Note    Parris Paredes Patient Status:  Inpatient    1960 MRN [id number]   Location [facility] 4W/SW/SE Attending Naa Ratliff MD   Hosp Day # 1 PCP Mihaela Nails MD     Subjective:  Patient is resting comfortably in bed today. Patient denies abdominal pain or discomfort. She was able to eat breakfast this morning without any nausea or vomiting. Reports eating a smaller meal than usual. Has not yet had a bowel movement since before the ERCP. Patient reports she feels ready to go home today. Ambulating to the bathroom.     Objective/Physical Exam:  General: Alert, orientated x3.  Cooperative.  No apparent distress.  Vital Signs:  Blood pressure 118/65, pulse 61, temperature 97.2 °F (36.2 °C), temperature source Oral, resp. rate 16, height 5' 9\" (1.753 m), weight 186 lb 8 oz (84.6 kg), SpO2 97%.  Wt Readings from Last 3 Encounters:   25 186 lb 8 oz (84.6 kg)   24 195 lb (88.5 kg)   11/15/24 196 lb (88.9 kg)     Lungs: No respiratory distress.  Cardiac: Regular rate and rhythm.   Abdomen:  Soft, non distended, non tender, with no rebound or guarding.  No peritoneal signs.   Extremities:  No lower extremity edema noted.    Incision: Clean, dry, intact, no erythema    Intake/Output:    Intake/Output Summary (Last 24 hours) at 2025 0950  Last data filed at 2025 1900  Gross per 24 hour   Intake 800 ml   Output --   Net 800 ml     I/O last 3 completed shifts:  In: 1402.5 [I.V.:1402.5]  Out: -   No intake/output data recorded.    Medications:    pantoprazole  40 mg Intravenous Daily    levothyroxine  88 mcg Oral Daily @ 0700       Labs:  Lab Results   Component Value Date    WBC 6.3 2025    HGB 11.9 2025    HCT 35.6 2025    .0 2025     Lab Results   Component Value Date     2025     2025    K 3.9 2025    K 3.9 2025     2025     2025    CO2 23.0  01/28/2025    CO2 23.0 01/28/2025    BUN 13 01/28/2025    BUN 13 01/28/2025    CREATSERUM 0.76 01/28/2025    CREATSERUM 0.76 01/28/2025    GLU 91 01/28/2025    GLU 91 01/28/2025    CA 8.8 01/28/2025    CA 8.8 01/28/2025    ALKPHO 396 01/28/2025     01/28/2025     01/28/2025    BILT 2.2 01/28/2025    ALB 3.5 01/28/2025    TP 6.0 01/28/2025     No results found for: \"PT\", \"INR\"      Assessment  Patient Active Problem List   Diagnosis    Diverticulosis of colon    Arthritis of left shoulder region    Rotator cuff tendonitis, left    Biceps tendonitis, left    Arthritis of shoulder region, left    Impingement syndrome of left shoulder    Obesity (BMI 30-39.9)    Hypothyroidism    Dyslipidemia    Anxiety    Encounter for therapeutic drug monitoring    Acute cholecystitis    Azotemia    Metabolic acidosis    Hyperglycemia    Abdominal pain of unknown etiology    Choledocholithiasis    Abnormal magnetic resonance cholangiopancreatography (MRCP)       Choledocholithiasis s/p laparoscopic cholecystectomy on 12/28/24 and ERCP 1/27/2025  Transaminitis, improving    Plan:  Patient is stable to be discharged home from a surgical standpoint.  Repeat CMP outpatient in 1 week to further trend LFTs.   Follow up with general surgery as needed.  Continue regular diet as tolerated.   Ambulate and up to chair.  GI prophylaxis with Protonix.  DVT prophylaxis with SCDs.    Quality:  DVT Mechanical Prophylaxis:   SCDs,    DVT Pharmacologic Prophylaxis   Medication   None                Code Status: Full Code  Santos: No urinary catheter in place  Santos Duration (in days):   Central line:    OLEG: 1/28/2025        Yael Alberto PA-C  1/28/2025  9:50 AM

## 2025-01-28 NOTE — PLAN OF CARE
Marietta is from home alone. Alert and oriented x 4 . Pod 1 ERCP. Cleared by all services. Moi general diet-passing flatus/belching lbm 1/25, denies nausea/vomiting, oob self, scds, voiding, tylenol prn pain, home self care-refer to dc summary  Problem: Patient Centered Care  Goal: Patient preferences are identified and integrated in the patient's plan of care  Description: Interventions:  - What would you like us to know as we care for you?   - Provide timely, complete, and accurate information to patient/family  - Incorporate patient and family knowledge, values, beliefs, and cultural backgrounds into the planning and delivery of care  - Encourage patient/family to participate in care and decision-making at the level they choose  - Honor patient and family perspectives and choices  Outcome: Adequate for Discharge     Problem: GASTROINTESTINAL - ADULT  Goal: Minimal or absence of nausea and vomiting  Description: INTERVENTIONS:  - Maintain adequate hydration with IV or PO as ordered and tolerated  - Nasogastric tube to low intermittent suction as ordered  - Evaluate effectiveness of ordered antiemetic medications  - Provide nonpharmacologic comfort measures as appropriate  - Advance diet as tolerated, if ordered  - Obtain nutritional consult as needed  - Evaluate fluid balance  Outcome: Adequate for Discharge     Problem: Patient/Family Goals  Goal: Patient/Family Long Term Goal  Description: Patient's Long Term Goal: To discharge     Interventions:  - Monitor vital signs   - Monitor appropriate labs   - Pain management   - Administer medications per order   - Follow MD orders   - Diagnostics per order   - Update/inform patient and family on plan of care   - Discharge planning   - See additional Care Plan goals for specific interventions  Outcome: Adequate for Discharge  Goal: Patient/Family Short Term Goal  Description: Patient's Short Term Goal: To manage pain and nausea     Interventions:   - Pain medication  -  Non-pharmacological pain management   - Antiemetics   - Follow MD orders     - See additional Care Plan goals for specific interventions  Outcome: Adequate for Discharge     Problem: PAIN - ADULT  Goal: Verbalizes/displays adequate comfort level or patient's stated pain goal  Description: INTERVENTIONS:  - Encourage pt to monitor pain and request assistance  - Assess pain using appropriate pain scale  - Administer analgesics based on type and severity of pain and evaluate response  - Implement non-pharmacological measures as appropriate and evaluate response  - Consider cultural and social influences on pain and pain management  - Manage/alleviate anxiety  - Utilize distraction and/or relaxation techniques  - Monitor for opioid side effects  - Notify MD/LIP if interventions unsuccessful or patient reports new pain  - Anticipate increased pain with activity and pre-medicate as appropriate  Outcome: Adequate for Discharge     Problem: RISK FOR INFECTION - ADULT  Goal: Absence of fever/infection during anticipated neutropenic period  Description: INTERVENTIONS  - Monitor WBC  - Administer growth factors as ordered  - Implement neutropenic guidelines  Outcome: Adequate for Discharge     Problem: DISCHARGE PLANNING  Goal: Discharge to home or other facility with appropriate resources  Description: INTERVENTIONS:  - Identify barriers to discharge w/pt and caregiver  - Include patient/family/discharge partner in discharge planning  - Arrange for needed discharge resources and transportation as appropriate  - Identify discharge learning needs (meds, wound care, etc)  - Arrange for interpreters to assist at discharge as needed  - Consider post-discharge preferences of patient/family/discharge partner  - Complete POLST form as appropriate  - Assess patient's ability to be responsible for managing their own health  - Refer to Case Management Department for coordinating discharge planning if the patient needs post-hospital  services based on physician/LIP order or complex needs related to functional status, cognitive ability or social support system  Outcome: Adequate for Discharge

## 2025-01-28 NOTE — PAYOR COMM NOTE
--------------  ADMISSION REVIEW     Payor: [employer] [facility]/HMO/POS/EPO  Subscriber #:  [id number]  Authorization Number: [id number]    ADMIT TO INPT STATUS 1/27/25  ADMIT TO OBSERVATION 1/26/25      ED Provider Notes signed by Randee York MD at 1/26/2025  7:19 PM       Author: Randee York MD Service: -- Author Type: Physician    Filed: 1/26/2025  7:19 PM Date of Service: 1/26/2025  7:16 PM Status: Signed    : Randee York MD (Physician)           [facility]  Emergency Department Attending Note     Chief Complaint:   Chief Complaint   Patient presents with    Abdomen/Flank Pain     HISTORY OF PRESENT ILLNESS:   Parris Paredes is a 64 year old female who presents to the ED 1 month status post laparoscopic cholecystectomy presents with a complaint of abdominal pain over the last 2 days.  Epigastric burning pain nonradiating.  Quite severe but not specifically associated with food intake or breathing.  Some associated nausea no vomiting.  Denies diarrhea.  Denies bleeding.  Denies urinary complaint.  Denies chest pain dyspnea     MEDICAL & SOCIAL HISTORY:   Past Medical History:    Anxiety    Thyroid disease      Past Surgical History:   Procedure Laterality Date    Cholecystectomy  12/28/2024    laparoscopic cholecystectomy    Knee surgery Bilateral 1990    Faviola biopsy stereo nodule 1 site right (cpt=19081)  2015        PHYSICAL EXAM   Vitals: /67   Pulse 61   Temp 98.4 °F (36.9 °C) (Temporal)   Resp 18   Wt 86.2 kg   SpO2 98%   BMI 28.06 kg/m²   /67   Pulse 61   Temp 98.4 °F (36.9 °C) (Temporal)   Resp 18   Wt 86.2 kg   SpO2 98%   BMI 28.06 kg/m²     General: A&Ox3, NAD  Constitutional: Well developed, well nourished, nontoxic  Head: atraumatic, normocephalic   Eyes: conjuctiva clear, no icterus, PERRL, EOMI, vision grossly normal  Ears: normal external appearance, no drainage  Nose:  Atraumatic, no swelling, no drainage, nares patent  Throat:  Moist pink  mucous membranes, airway is patent  Neck:  Soft supple, no masses, no tracheal deviation, no stridor  Chest:  No bruising or abrasions, no tenderness, no deformity  Cardiac:  Regular rate and rhythm  Lung:  No distress, no retractions. Clear to auscultation bilaterally, no w/r/r  Abdomen:  Soft, tender to palpation to epigastrium without rebound or guarding or rigidity or pulsatile mass negative not sonographic Paredes sign, nondistended, normal BS  Back: No stepoff/deformity  Extremities: FROM all ext, no deformities, intact equal peripheral pulses, no cyanosis or edema  Neuro: No facial droop, no slurred speech, moving all extremities freely, SILT to the bilateral upper and lower extremities  Psych: A&Ox3, normal affect, cooperative, calm  Skin: No rash, no petechiae/purpura, warm, dry      RESULTS  LABS:   Results for orders placed or performed during the hospital encounter of 01/26/25   EKG 12 Lead    Collection Time: 01/26/25  3:16 PM   Result Value Ref Range    Ventricular rate 63 BPM    Atrial rate 63 BPM    P-R Interval 152 ms    QRS Duration 98 ms    Q-T Interval 412 ms    QTC Calculation (Bezet) 421 ms    P Axis 68 degrees    R Axis 67 degrees    T Axis 68 degrees   Lipase    Collection Time: 01/26/25  4:31 PM   Result Value Ref Range    Lipase 30 12 - 53 U/L   Comp Metabolic Panel (14)    Collection Time: 01/26/25  4:31 PM   Result Value Ref Range    Glucose 101 (H) 70 - 99 mg/dL    Sodium 138 136 - 145 mmol/L    Potassium 3.9 3.5 - 5.1 mmol/L    Chloride 104 98 - 112 mmol/L    CO2 21.0 21.0 - 32.0 mmol/L    Anion Gap 13 0 - 18 mmol/L    BUN 17 9 - 23 mg/dL    Creatinine 0.80 0.55 - 1.02 mg/dL    BUN/CREA Ratio 21.3 (H) 10.0 - 20.0    Calcium, Total 9.9 8.7 - 10.4 mg/dL    Calculated Osmolality 288 275 - 295 mOsm/kg    eGFR-Cr 83 >=60 mL/min/1.73m2     (H) 10 - 49 U/L     (H) <34 U/L    Alkaline Phosphatase 364 (H) 50 - 130 U/L    Bilirubin, Total 2.7 (H) 0.2 - 1.1 mg/dL    Total Protein 7.4  5.7 - 8.2 g/dL    Albumin 4.4 3.2 - 4.8 g/dL    Globulin  3.0 2.0 - 3.5 g/dL    A/G Ratio 1.5 1.0 - 2.0   CBC With Differential With Platelet    Collection Time: 01/26/25  4:31 PM   Result Value Ref Range    WBC 10.0 4.0 - 11.0 x10(3) uL    RBC 4.76 3.80 - 5.30 x10(6)uL    HGB 14.4 12.0 - 16.0 g/dL    HCT 43.8 35.0 - 48.0 %    MCV 92.0 80.0 - 100.0 fL    MCH 30.3 26.0 - 34.0 pg    MCHC 32.9 31.0 - 37.0 g/dL    RDW-SD 42.5 35.1 - 46.3 fL    RDW 12.4 11.0 - 15.0 %    .0 150.0 - 450.0 10(3)uL    Neutrophil Absolute Prelim 7.82 (H) 1.50 - 7.70 x10 (3) uL    Neutrophil Absolute 7.82 (H) 1.50 - 7.70 x10(3) uL    Lymphocyte Absolute 1.32 1.00 - 4.00 x10(3) uL    Monocyte Absolute 0.70 0.10 - 1.00 x10(3) uL    Eosinophil Absolute 0.07 0.00 - 0.70 x10(3) uL    Basophil Absolute 0.03 0.00 - 0.20 x10(3) uL    Immature Granulocyte Absolute 0.03 0.00 - 1.00 x10(3) uL    Neutrophil % 78.5 %    Lymphocyte % 13.2 %    Monocyte % 7.0 %    Eosinophil % 0.7 %    Basophil % 0.3 %    Immature Granulocyte % 0.3 %   Troponin I (High Sensitivity)    Collection Time: 01/26/25  4:31 PM   Result Value Ref Range    Troponin I (High Sensitivity) 4 <=34 ng/L     CT ABDOMEN+PELVIS   No evidence of acute abnormality in the abdomen or pelvis.  Cholecystectomy.  Mild dilatation of the common bile duct and slight central intrahepatic biliary ductal dilatation is nonspecific but may reflect post cholecystectomy change.  Advise biliary laboratory correlation and consider further imaging with MRCP as clinically warranted.  Colonic diverticulosis.     CT CHEST PE AORTA    No evidence of pulmonary embolism to the large proximal bilateral segmental arterial level.      Procedures:   Procedures    EKG: Sinus rhythm with a normal rate of 63, normal intervals, normal QRS, nonspecific ST-T wave changes without overt signs of acute ischemia, no old immediately available for comparison     ED COURSE        Re-Evaluation: improved      Disposition & Plan:    Clinical Impression/Final Diagnosis:   1. Abdominal pain of unknown etiology        Medical Decision Making: Patient quite tender to the epigastrium, LFTs significantly elevated, consulted surgery they recommend admission MRI in the a.m.    Medical Decision Making  Amount and/or Complexity of Data Reviewed  External Data Reviewed: notes.  Labs: ordered. Decision-making details documented in ED Course.  Radiology: ordered and independent interpretation performed. Decision-making details documented in ED Course.  ECG/medicine tests: ordered and independent interpretation performed. Decision-making details documented in ED Course.  Discussion of management or test interpretation with external provider(s): Discussed with the general surgeon Dr. Alarcon who would like MRCP in the a.m.  Disposition: Admit  There are no disposition comments on file for this visit.       History and Physical         Assessment and Plan:     S/p laparoscopic cholecystectomy 12/28/24  Epigastric pain  Elevated liver enzymes  -Noted to have significantly elevated liver enzymes with alk phos 346   total bilirubin 2.7, per chart review on discharge 12/28 liver enzymes normal.  -CT abdomen pelvis significant for mild dilatation of common bile duct.  This could be related to retained stone, will obtain MRI/MRCP in the a.m.  -General Surgery on consult, appreciate recs  -Maintain n.p.o.  -IV fluid hydration  -Trend LFTs  -Will also obtain hepatitis panel to exclude other etiologies of her elevated LFTs  -Morphine as needed for pain control     Other medical conditions  Hypothyroidism  Anxiety     Prophylaxis  Lovenox     CODE STATUS  Full     History of Present Illness:  Parris Paredes is a(n) 64 year old female, who presents for evaluation of epigastric pain/abdominal pain for the last 2 days.  Patient is status post laparoscopic cholecystectomy 12/28/2024 and had unremarkable hospital course.  Patient stated she was discharged  right after the procedure and has been doing well.  Then 2 days ago she started experiencing abdominal pain, nonradiating specifically in the epigastric region associated with nausea but no vomiting.  Denies any fever or chills.  Denies any chest pain or shortness of breath.  Not associated with food.  No lightheadedness or dizziness.  Has not been able to keep any intake due to the pain.  Denies any dysuria.  No other complaints.  On presentation to the ED, initial vital signs reveal temp 98.4, heart rate 75, respiratory 18, blood pressure 155/94 which improved to 130/67, SpO2 98% on room air.  Lab work significant for markedly elevated liver enzymes with alk phos of 346,   total bilirubin 2.7.  Patient underwent imaging with CT chest which did not reveal pulmonary embolism.  She also underwent CT abdomen pelvis which revealed a mild dilation of the common bile duct as well as slight central intrahepatic biliary ductal dilatation, may reflect postcholecystectomy.  Patient received Maalox, Pepcid, morphine, Zofran.  She was admitted under hospitalist service with consultation to general surgery.       Temp:  [98.4 °F (36.9 °C)-98.5 °F (36.9 °C)] 98.5 °F (36.9 °C)  Pulse:  [61-80] 80  Resp:  [18] 18  BP: (123-155)/(66-94) 137/77  SpO2:  [98 %] 98 %     General:  Alert and oriented.  Diffuse skin problem:  None.  Eye:  Pupils are equal, round and reactive to light, extraocular movements are intact, Normal conjunctiva.  HENT:  Normocephalic, oral mucosa is moist.  Head:  Normocephalic, atraumatic.  Neck:  Supple, non-tender, no carotid bruit, no jugular venous distention, no lymphadenopathy, no thyromegaly.  Respiratory:  Lungs are clear to auscultation, respirations are non-labored, breath sounds are equal, symmetrical chest wall expansion.  Cardiovascular:  Normal rate, regular rhythm, no murmur, no edema.  Gastrointestinal:  Soft, tender in the epigastric region, non-distended, normal bowel sounds, no  organomegaly.  Lymphatics:  No lymphadenopathy neck, axilla, groin.  Musculoskeletal: Normal range of motion.  normal strength.  Feet:  Normal pulses.  Neurologic:  Alert, oriented, no focal deficits, cranial nerves II-XII are grossly intact.  Cognition and Speech:  Oriented, speech clear and coherent.  Psychiatric:  Cooperative, appropriate mood & affect.      1/27:    SURGERY:    Parris Paredes is a 64 year old female who presents to [facility] on 1/26/25 with 2 days of worsening epigastric pain associated with some nausea. She recently had a laparoscopic cholecystectomy on 12/28/24 and was recovering well without complaints until then. No fever, chills, or vomiting. CT A/P and MRCP consistent with choledocholithiasis. , , alk phos 364, total bili 2.7 on arrival.      Past medical history anxiety     Past abdominal surgical history s/p lap tyler on 12/28/24      Physical Exam:  /73 (BP Location: Left arm)   Pulse 78   Temp 98.7 °F (37.1 °C) (Oral)   Resp 16   Ht 5' 9\" (1.753 m)   Wt 186 lb 8 oz (84.6 kg)   SpO2 94%   BMI 27.54 kg/m²      Abdominal:      General: There is no distension.      Palpations: Abdomen is soft.      Tenderness: There is no abdominal tenderness. There is no guarding or rebound.        Lab 01/26/25  1631 01/27/25  0813   RBC 4.76 4.25   HGB 14.4 12.9   HCT 43.8 39.3   MCV 92.0 92.5   MCH 30.3 30.4   MCHC 32.9 32.8   RDW 12.4 12.8   NEPRELIM 7.82* 6.06   WBC 10.0 7.7   .0 221.0      * 74   BUN 17 16   CREATSERUM 0.80 0.74   CA 9.9 9.3   ALB 4.4 3.8    140   K 3.9 4.0    106   CO2 21.0 21.0   ALKPHO 364* 384*   * 880*   * 824*   BILT 2.7* 2.5*   TP 7.4 6.5       MRI ABDOMEN AND MRCP W/3D    Choledocholithiasis with a 0.5 cm stone within the mid to distal common bile duct.  Mild biliary ductal dilation      Tranaminitis with possible choledocholithiasis s/p laparoscopic cholecystectomy on 12/28/24     Plan:  No  acute surgical intervention indicated at this time.   GI consulted for possible ERCP if needed  Analgesics and antiemetics as needed  Keep NPO in anticipation of possible GI procedure today, otherwise okay for diet from surgical standpoint as tolerated  DVT prophylaxis with lovenox  GI prophylaxis with protonix  Patient seen with Dr. Mayo who can provide additional recommendations as needed.  Nir Carney PA-C     The patient was independently examined. Agree with the PA's assessment and plan. Labs and imaging were independently reviewed. MRCP with 0.5 cm obstructing stone. Pain is resolved now. Will ask GI to evaluate for ERCP. NPO while awaiting their evaluation. The patient was updated on the plan.       Scheduled Medications    enoxaparin  40 mg Subcutaneous Daily    pantoprazole  40 mg Intravenous Daily    levothyroxine  88 mcg Oral Daily @ 0700         sodium chloride 0.9% infusion  Intravenous,   100 mL/hr,   Continuous       GI:    Reason for Consultation:  Choledocholithiasis      History of Present Illness:  Parris Paredes is a a(n) 64 year old female w/ a history of thyroid disease, history of cholecystectomy December 28, who presents with right upper quadrant abdominal pain, epigastric pain associated with nausea.  Felt similar to pain that prompted cholecystectomy recently.  No fevers or chills.  No melena or hematochezia.  No diarrhea.  She continues to have mild pain this morning.    Assessment & Plan   Parris Paredes is a a(n) 64 year old female w/ a history of thyroid disease, history of cholecystectomy December 28, who presents with ruq abdominal pain.  Labs notable for total bilirubin 2.5, , , alkaline phosphatase 384.  MRCP shows choledocholithiasis with a persistent ductal stone and associated biliary ductal dilation.  No signs of cholangitis.     Recommend:  -Plan ERCP with Dr. Vasquez this afternoon  -Trend LFTs  -NPO for now      Endoscopic retrograde  cholangio-pancreatography (ERCP) report     Procedure: ERCP w/ sphincterotomy, stone removal      Pre-operative diagnosis: upper abdominal pain, abnormal MRCP, choledocholithiasis     S/p cholecystectomy 12/28/25     Post-operative diagnosis: choledocholithiasis    ERCP findings:    The duodenoscope was passed to the 2nd portion of the duodenum and reduced into short/standard position uneventfully. The very limited views of the upper gastrointestinal tract visualized in passing of the duodenoscope were grossly unremarkable. The  film was normal. The ampulla was identified and appeared normal. The bile duct was selectively cannulated with a DASH21 sphincterotome with an 0.021 in jagwire on initial attempt. The wire was advanced into the intrahepatic biliary system. Contrast was injected confirming bile duct cannulation. A biliary sphincterotomy was performed without post-sphincterotomy bleeding. The sphinctertome was removed and exchanged for a 9-12 mm balloon extraction catheter. Cholangiogram was performed. The intrahepatics appeared normal. The bile duct was prominent in size at  in size at 8 mm. Multiple balloon sweeps were performed with removal of sludge and stone fragments. There was excellent drainage of contrast and bile.  There were no further filling defects and final occlusion cholangiogram.  Further balloon sweeps were unremarkable without any other stones removed and smooth withdrawal through the ampullary orifice and distal bile duct.  The pancreatic duct was neither cannulated nor injected with contrast. The duodenoscope was withdrawn, liquid contents suctioned and procedure completed. The patient was extubated in the procedure room and taken to PACU where she was examined and doing well.     Impression:  Choledocholithiasis s/p biliary sphincterotomy and stone removal     Recommend:   1. Monitor liver enzymes  2. Clear liquid diet as tolerated  3. IV fluids      MEDICATIONS ADMINISTERED IN LAST  1 DAY:  acetaminophen (Tylenol Extra Strength) tab 500 mg       Date Action Dose Route User    1/28/2025 0813 Given 500 mg Oral Nabila Braga RN     HYDROcodone-acetaminophen (Norco) 5-325 MG per tab 1 tablet       Date Action Dose Route User    1/27/2025 2236 Given 1 tablet Oral Adrienne Mathias RN     lactated ringers infusion       Date Action Dose Route User    1/27/2025 1718 New Bag (none) Intravenous Teri Nowak MD          levothyroxine (Synthroid) tab 88 mcg       Date Action Dose Route User    1/28/2025 0534 Given 88 mcg Oral Adrienne Mathias RN          ondansetron (Zofran) 4 MG/2ML injection 4 mg       Date Action Dose Route User    1/27/2025 1748 Given 4 mg Intravenous Teri Nowak MD          pantoprazole (Protonix) 40 mg in sodium chloride 0.9% PF 10 mL IV push       Date Action Dose Route User    1/28/2025 0801 Given 40 mg Intravenous Nabila Braga RN       sodium chloride 0.9% infusion       Date Action Dose Route User    1/28/2025 0534 New Bag (none) Intravenous Adrienne Mathias RN    1/27/2025 2236 New Bag (none) Intravenous Adrienne Mathias RN    1/27/2025 1219 New Bag (none) Intravenous Elza Rea RN         Vitals (last day)       Date/Time Temp Pulse Resp BP SpO2 Weight O2 Device O2 Flow Rate (L/min) Who    01/28/25 0800 97.2 °F (36.2 °C) 61 16 118/65 97 % -- None (Room air) -- DP    01/28/25 0532 97.5 °F (36.4 °C) 60 18 122/68 96 % -- None (Room air) -- DPA    01/27/25 2210 97.9 °F (36.6 °C) 69 18 136/79 94 % -- None (Room air) -- DPA    01/27/25 1827 -- 80 18 127/71 96 % -- None (Room air) -- SS    01/27/25 1820 98.3 °F (36.8 °C) 81 17 -- -- -- None (Room air) -- SP    01/27/25 1815 -- 81 14 115/58 98 % -- -- -- SP    01/27/25 1810 -- -- -- -- 96 % -- None (Room air) -- SP    01/27/25 1805 -- 83 13 126/60 96 % -- -- -- SP    01/27/25 1800 -- 92 22 129/68 96 % -- None (Room air) -- SP    01/27/25 1756 -- 89 14 127/65 99 % -- None (Room air) -- SP    01/27/25 1756 98.5  °F (36.9 °C) -- -- -- -- -- -- --     01/27/25 1658 -- 87 24 -- -- -- None (Room air) --     01/27/25 1628 99.5 °F (37.5 °C) 92 16 124/65 97 % -- None (Room air) --     01/27/25 0730 98.7 °F (37.1 °C) -- 16 113/73 94 % -- None (Room air) --     01/27/25 0421 98.1 °F (36.7 °C) 78 16 110/66 96 % -- None (Room air) -- DJ

## 2025-01-28 NOTE — PROGRESS NOTES
[facility]     Gastroenterology Progress Note    Parris Paredes Patient Status:  Inpatient    1960 MRN [id number]   Location [facility] 4W/SW/SE Attending Naa Ratliff MD   Hosp Day # 1 PCP Mihaela Nails MD       Subjective:   Feels well today, no abd pain, n/v.     Objective:   Blood pressure 118/65, pulse 61, temperature 97.2 °F (36.2 °C), temperature source Oral, resp. rate 16, height 5' 9\" (1.753 m), weight 186 lb 8 oz (84.6 kg), SpO2 97%. Body mass index is 27.54 kg/m².    General: awake, alert and oriented, no acute distress  HEENT: moist mucus membranes  PULM: no conversational dyspnea  CARDIOVASCULAR: regular rate and rhythm, the extremities are warm and well perfused  GI: soft, non-tender, non-distended, + BS, no rebound/guarding   EXTREMITIES: no edema, moving all extremities  SKIN: no visible rash  NEURO: appropriate and interactive    Assessment and Plan:   Parris Paredes is a a(n) 64 year old female w/ a history of thyroid disease, history of cholecystectomy , who presents with ruq abdominal pain.  Labs notable for total bilirubin 2.5, , , alkaline phosphatase 384.  MRCP shows choledocholithiasis with a persistent ductal stone and associated biliary ductal dilation. S/p ERCP with findings of choledocholithiasis s/p biliary sphincterotomy and stone removal.    LFTs down today. Ok to advance diet. Ok to discharge home from my stance. LFTs in one week to ensure continues to normalize. No mandatory GI follow-up needed unless new issues arise. To sign off, please call with questions.       Annette Merino MD  [facility] Gastroenter[facility]      Results:     Lab Results   Component Value Date    WBC 6.3 2025    HGB 11.9 (L) 2025    HCT 35.6 2025    .0 2025    CREATSERUM 0.76 2025    CREATSERUM 0.76 2025    BUN 13 2025    BUN 13 2025     2025     2025    K 3.9  01/28/2025    K 3.9 01/28/2025     01/28/2025     01/28/2025    CO2 23.0 01/28/2025    CO2 23.0 01/28/2025    GLU 91 01/28/2025    GLU 91 01/28/2025    CA 8.8 01/28/2025    CA 8.8 01/28/2025    ALB 3.5 01/28/2025    ALKPHO 396 (H) 01/28/2025    BILT 2.2 (H) 01/28/2025    TP 6.0 01/28/2025     (H) 01/28/2025     (H) 01/28/2025    T4F 1.1 12/01/2022    TSH 1.039 01/31/2024    LIP 30 01/26/2025       XR ENDO BILE/PANCREAS (CPT=74330)    Result Date: 1/27/2025  CONCLUSION: ERCP as above demonstrating intra and extrahepatic biliary ductal dilatation, filling defects compatible with choledocholithiasis, which are cleared with balloon sweeps.  Please refer to procedure note for additional details.   Dictated by (CST): Shawn Resendiz MD on 1/27/2025 at 6:15 PM     Finalized by (CST): Shawn Resendiz MD on 1/27/2025 at 6:16 PM          MRI ABDOMEN AND MRCP W/3D (CPT=74181/02040)    Result Date: 1/27/2025  CONCLUSION:   Choledocholithiasis with a 0.5 cm stone within the mid to distal common bile duct.  Mild biliary ductal dilation.    Dictated by (CST): Jeovany Maldonado MD on 1/27/2025 at 7:23 AM     Finalized by (CST): Jeovany Maldonado MD on 1/27/2025 at 7:29 AM          CT ABDOMEN+PELVIS(CONTRAST ONLY)(CPT=74177)    Result Date: 1/26/2025  CONCLUSION:   No evidence of acute abnormality in the abdomen or pelvis.  Cholecystectomy.  Mild dilatation of the common bile duct and slight central intrahepatic biliary ductal dilatation is nonspecific but may reflect post cholecystectomy change.  Advise biliary laboratory correlation and consider further imaging with MRCP as clinically warranted.  Colonic diverticulosis.  Lesser incidental findings as above.    Dictated by (CST): Robin Contreras MD on 1/26/2025 at 6:13 PM     Finalized by (CST): Robin Contreras MD on 1/26/2025 at 6:20 PM          CT CHEST PE AORTA (IV ONLY) (CPT=71260)    Result Date: 1/26/2025  CONCLUSION:   No evidence of pulmonary embolism to the  large proximal bilateral segmental arterial level.     Dictated by (CST): Robin Contreras MD on 1/26/2025 at 6:09 PM     Finalized by (CST): Robin Contreras MD on 1/26/2025 at 6:13 PM         EKG 12 Lead    Result Date: 1/27/2025  Normal sinus rhythm with sinus arrhythmia Normal ECG When compared with ECG of 01-DEC-2022 11:39, Questionable change in The axis Nonspecific T wave abnormality no longer evident in Inferior leads Confirmed by NURIA GRIER ([id number]) on 1/27/2025 9:41:49 AM

## 2025-01-31 NOTE — PAYOR COMM NOTE
--------------  DISCHARGE REVIEW    [employer]: [employer] [facility]/HMO/POS/EPO  Subscriber #:  [id number]  Authorization Number: [id number]    Admit date: 1/27/25  Admit time:   3:12 PM  Discharge Date: 1/28/2025  1:54 PM     Admitting Physician: Sonia Falcon MD  Attending Physician:  No att. providers found  Primary Care Physician: Mihaela Nails MD       Discharge Summary Notes    No notes of this type exist for this encounter.         REVIEWER COMMENTS    HOME

## 2025-02-03 NOTE — DISCHARGE SUMMARY
[facility] Hospitalist Discharge Summary   Patient ID:  Parris Paredes  [id number]  64 year old  6/18/1960    Admit date: 1/26/2025  Discharge date: 1/28/2025  Primary Care Physician: Mihaela Nails MD   Attending Physician: No att. providers found   Consults:   Consultants         Provider   Role Specialty     Annette Merino MD      Consulting Physician GASTROENTEROLOGY     Blu Roblero MD      Consulting Physician SURGERY, GENERAL            Discharge Diagnoses:   Abdominal pain of unknown etiology    Reason for admission  Copied from admission H&P: Parris Paredes is a(n) 64 year old female, who presents for evaluation of epigastric pain/abdominal pain for the last 2 days.  Patient is status post laparoscopic cholecystectomy 12/28/2024 and had unremarkable hospital course.  Patient stated she was discharged right after the procedure and has been doing well.  Then 2 days ago she started experiencing abdominal pain, nonradiating specifically in the epigastric region associated with nausea but no vomiting.  Denies any fever or chills.  Denies any chest pain or shortness of breath.  Not associated with food.  No lightheadedness or dizziness.  Has not been able to keep any intake due to the pain.  Denies any dysuria.  No other complaints.  On presentation to the ED, initial vital signs reveal temp 98.4, heart rate 75, respiratory 18, blood pressure 155/94 which improved to 130/67, SpO2 98% on room air.  Lab work significant for markedly elevated liver enzymes with alk phos of 346,   total bilirubin 2.7.  Patient underwent imaging with CT chest which did not reveal pulmonary embolism.  She also underwent CT abdomen pelvis which revealed a mild dilation of the common bile duct as well as slight central intrahepatic biliary ductal dilatation, may reflect postcholecystectomy.  Patient received Maalox, Pepcid, morphine, Zofran.  She was admitted under hospitalist service with consultation to  general surgery.     Hospital Course:  Choledocholithiasis  Transaminitis  Abd pain Nausea   MRI abd reviewed. Choledocholithiasis  Had cholecstectomy 12/2024  General surgery on consult. GI consult.   S/p ERCP with findings of choledocholithisis s/p biliary sphincterotomy and stone removal   LFT's trending down.   No further abd pain Medically stbale for DC home today   Hypothyroidism  Levothyroxine         EXAM:   GENERAL: no apparent distress, comfortable  NEURO: A/A Ox3, no focal deficits  RESP: non labored, CTAB/L  CARDIO: Regular, no murmur  ABD: soft, NT, ND  EXTREMITIES: no edema, no calf tenderness    Operative Procedures: Procedure(s) (LRB):  ENDOSCOPIC RETROGRADE CHOLANGIOPANCREATOGRAPHY (ERCP) (N/A)    Discharge Instructions     Medication List        CONTINUE taking these medications      acetaminophen 500 MG Tabs  Commonly known as: Tylenol Extra Strength  Take 2 tablets (1,000 mg total) by mouth every 4 (four) hours as needed for Pain.     ALPRAZolam 0.25 MG Tabs  Commonly known as: Xanax  Take 1 tablet (0.25 mg total) by mouth 2 (two) times daily as needed for Anxiety.     levothyroxine 88 MCG Tabs  Commonly known as: Synthroid  Take 1 tablet (88 mcg total) by mouth before breakfast.     Meloxicam 15 MG Tabs  Take 1 tablet (15 mg total) by mouth daily as needed for Pain.     predniSONE 20 MG Tabs  Commonly known as: Deltasone  Take 1 tablet (20 mg total) by mouth daily.     Wegovy 2.4 MG/0.75ML Soaj  Generic drug: semaglutide-weight management  Inject 0.75 mL (2.4 mg total) into the skin once a week.            STOP taking these medications      Ibuprofen 200 MG Caps  Commonly known as: Advil     oxyCODONE 5 MG Tabs              Activity: activity as tolerated  Diet: regular diet  Wound Care: NA  Code Status: Full Code        Discharge Instructions         See handout regarding care following an ERCP    Call and make follow up appointments; call sooner with any questions or concerns      It is  recommended that within a week you have a CMP drawn and LFT's-please follow up with your Dr to have these labs drawn      Discharge References/Attachments    ERCP, Discharge Instructions for (English)  Liver Panel (English)           Important follow up:   Follow-up Information       Mihaela Nails MD Follow up.    Specialty: Family Medicine  Why: Repeat CMP outpatient in 1 week   LFTs in one week to ensure continues to normalize.  Contact information:  [address] [place] [address]  [phone]               Chaitanya Vasquez MD Follow up.    Specialty: GASTROENTEROLOGY  Contact information:  [address] [facility] [address]  [place] [address]  [phone]               Yael Alberto PA-C Follow up.    Specialty: Physician Assistant  Why: please call for appointment, As needed    general surgery  Contact information:  [address] [facility] [address]  [facility] [id number] [phone]                             -PCP in [] within 7 days [] within 14 days [] other     Disposition: home  Discharged Condition: good    Hospital Discharge Diagnoses:  choledocholithiasis    Lace+ Score: 55  59-90 High Risk  29-58 Medium Risk  0-28   Low Risk.    TCM Follow-Up Recommendation:  LACE 29-58: Moderate Risk of readmission after discharge from the hospital.            Total Time Coordinating Care: Greater than 30 minutes    Patient had opportunity to ask questions, state understanding, and agree with therapeutic plan as outlined    Naa Ratliff MD  Hospitalist  2/3/2025

## 2025-02-04 ENCOUNTER — LAB ENCOUNTER (OUTPATIENT)
Dept: LAB | Facility: HOSPITAL | Age: 65
End: 2025-02-04
Payer: COMMERCIAL

## 2025-02-04 DIAGNOSIS — K80.50 CHOLEDOCHOLITHIASIS: ICD-10-CM

## 2025-02-04 LAB
ALBUMIN SERPL-MCNC: 4.3 G/DL (ref 3.2–4.8)
ALBUMIN/GLOB SERPL: 1.4 {RATIO} (ref 1–2)
ALP LIVER SERPL-CCNC: 245 U/L
ALT SERPL-CCNC: 135 U/L
ANION GAP SERPL CALC-SCNC: 10 MMOL/L (ref 0–18)
AST SERPL-CCNC: 50 U/L (ref ?–34)
BILIRUB SERPL-MCNC: 0.8 MG/DL (ref 0.2–1.1)
BUN BLD-MCNC: 10 MG/DL (ref 9–23)
BUN/CREAT SERPL: 12.2 (ref 10–20)
CALCIUM BLD-MCNC: 9.3 MG/DL (ref 8.7–10.4)
CHLORIDE SERPL-SCNC: 105 MMOL/L (ref 98–112)
CO2 SERPL-SCNC: 27 MMOL/L (ref 21–32)
CREAT BLD-MCNC: 0.82 MG/DL
EGFRCR SERPLBLD CKD-EPI 2021: 81 ML/MIN/1.73M2 (ref 60–?)
GLOBULIN PLAS-MCNC: 3 G/DL (ref 2–3.5)
GLUCOSE BLD-MCNC: 89 MG/DL (ref 70–99)
OSMOLALITY SERPL CALC.SUM OF ELEC: 293 MOSM/KG (ref 275–295)
POTASSIUM SERPL-SCNC: 4.1 MMOL/L (ref 3.5–5.1)
PROT SERPL-MCNC: 7.3 G/DL (ref 5.7–8.2)
SODIUM SERPL-SCNC: 142 MMOL/L (ref 136–145)

## 2025-02-04 PROCEDURE — 36415 COLL VENOUS BLD VENIPUNCTURE: CPT

## 2025-02-04 PROCEDURE — 80053 COMPREHEN METABOLIC PANEL: CPT

## 2025-02-04 NOTE — PROGRESS NOTES
HPI:   Parris Paredes is a 64 year old female who presents for a complete physical exam.    Last mammo:  scheduled   Last pap:  HPV + 2024  cscope UTD  Exercise:  Yes, 2x a week with a      Had cholecystectomy 12/24  And recently admitted for abd pain  Had ERCP for a stone  Feeling better now  Pain gone  Diarrhea resolving       Hypothyroidism    Seeing Lyn Paredes for help with weight   Lost almost 70 lbs on wegovy and feels great!    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     Cholesterol, Total (mg/dL)   Date Value   01/31/2024 182   12/01/2022 225 (H)   11/17/2021 204 (H)     HDL Cholesterol (mg/dL)   Date Value   01/31/2024 69 (H)   12/01/2022 87 (H)   11/17/2021 81 (H)     LDL Cholesterol (mg/dL)   Date Value   01/31/2024 98   12/01/2022 120 (H)   11/17/2021 110 (H)        Current Outpatient Medications   Medication Sig Dispense Refill    acetaminophen 500 MG Oral Tab Take 2 tablets (1,000 mg total) by mouth every 4 (four) hours as needed for Pain. 30 tablet 0    semaglutide-weight management (WEGOVY) 2.4 MG/0.75ML Subcutaneous Solution Auto-injector Inject 0.75 mL (2.4 mg total) into the skin once a week. 9 mL 1    predniSONE 20 MG Oral Tab Take 1 tablet (20 mg total) by mouth daily. 5 tablet 0    Meloxicam 15 MG Oral Tab Take 1 tablet (15 mg total) by mouth daily as needed for Pain. 30 tablet 0    ALPRAZolam (XANAX) 0.25 MG Oral Tab Take 1 tablet (0.25 mg total) by mouth 2 (two) times daily as needed for Anxiety. 20 tablet 0    levothyroxine 88 MCG Oral Tab Take 1 tablet (88 mcg total) by mouth before breakfast. 90 tablet 3      Past Medical History:    Anxiety    Disorder of thyroid    Thyroid disease     Visual impairment      Past Surgical History:   Procedure Laterality Date    Cholecystectomy  2024    laparoscopic cholecystectomy    Knee surgery Bilateral     Faviola biopsy stereo nodule 1 site right (cpt=19081)        Family History   Problem Relation Age of Onset    Hypertension Father     Heart Disease Father     Other (afib) Father          95      Social History:   Social History     Socioeconomic History    Marital status:    Tobacco Use    Smoking status: Never    Smokeless tobacco: Never   Vaping Use    Vaping status: Never Used   Substance and Sexual Activity    Alcohol use: Yes     Alcohol/week: 0.0 standard drinks of alcohol     Comment: Socially     Drug use: No   Other Topics Concern    Caffeine Concern Yes     Comment: Coffee, soda - 4 cups per day      Social Drivers of Health     Food Insecurity: No Food Insecurity (2025)    Food Insecurity     Food Insecurity: Never true   Transportation Needs: No Transportation Needs (2025)    Transportation Needs     Lack of Transportation: No   Housing Stability: Low Risk  (2025)    Housing Stability     Housing Instability: No          REVIEW OF SYSTEMS:   GENERAL: feels well otherwise  SKIN: denies any unusual skin lesions  LUNGS: denies shortness of breath  CARDIOVASCULAR: denies chest pain  GI: denies abdominal pain,  see HPI  PSYCHE: denies depression or anxiety      EXAM:   /74   Pulse 64   Temp 97.6 °F (36.4 °C)   Ht 5' 9\" (1.753 m)   Wt 183 lb (83 kg)   SpO2 98%   BMI 27.02 kg/m²   Body mass index is 27.02 kg/m².   GENERAL: well developed, well nourished,in no apparent distress  SKIN: no rashes,no suspicious lesions  HEENT: atraumatic, normocephali  EYES,conjunctiva are clear  NECK: supple,no adenopathy  BREAST: no dominant or suspicious mass, no nipple discharge  LUNGS: clear to auscultation  CARDIO: RRR without murmur  GI: good BS's,no masses, HSM or tenderness    : normal external genitalia, normal  appearing cervix, no adnexal masses.   EXTREMITIES: no edema,    The 10-year ASCVD risk score (Sondra COVARRUBIAS, et al., 2019) is: 2.8%    Values used to calculate the score:      Age: 64 years      Sex: Female      Is Non- : No      Diabetic: No      Tobacco smoker: No      Systolic Blood Pressure: 116 mmHg      Is BP treated: No      HDL Cholesterol: 69 mg/dL      Total Cholesterol: 182 mg/dL    ASSESSMENT AND PLAN:   Parris Paredes is a 64 year old female who presents for a complete physical exam.  Encounter Diagnoses   Name Primary?    Physical exam Yes    Fear of flying     Hypothyroidism, unspecified type        Discussed with patient pap smear guidelines and the importance of screening for cervical cancer  Discussed the importance of yearly mammograms starting at age 40 to help detect breast cancer.   Self breast exam explained and encouraged to perform monthly.   Health maintenance labs, recommend yearly: Lipids, CMP, and CBC.   Discussed importance of screening for colon cancer with colonoscopies starting at age 45, or sooner if high risk  Recommended low fat diet, low carb diet and regular aerobic exercise.    The patient indicates understanding of these issues and agrees to the plan.  The patient is asked to return for CPX in 1 yr.    1. Physical exam  + HPV last pap one year ago, recheck today and will refer to gyn if needed pending results  - CBC With Differential With Platelet; Future  - Comp Metabolic Panel (14); Future  - TSH W Reflex To Free T4; Future  - Lipid Panel; Future  - Hemoglobin A1C (Glycohemoglobin) [E]; Future  - ThinPrep PAP Smear [E]; Future  - Hpv High Risk , Thin Prep Collect; Future  - ThinPrep PAP Smear [E]  - Hpv High Risk , Thin Prep Collect    2. Fear of flying  Pt given prescription for benzodiazepines. Explained that this is for prn use, such as a panic attack and these medications can be  addictive and tolerance can develop. If patient is needing this  medication too much, a daily medication or change in their medication will be needed to help control their anxiety. Pt agrees to this.       - ALPRAZolam (XANAX) 0.25 MG Oral Tab; Take 1 tablet (0.25 mg total) by mouth 2 (two) times daily as needed for Anxiety.  Dispense: 20 tablet; Refill: 0    3. Hypothyroidism, unspecified type    - levothyroxine 88 MCG Oral Tab; Take 1 tablet (88 mcg total) by mouth before breakfast.  Dispense: 90 tablet; Refill: 3    4. Encounter for screening mammogram for malignant neoplasm of breast      - [facility] 2D+3D SCREENING BILAT (CPT=77067/85764); Future        6. Status post cholecystectomy  Doing well  Advance diet as tolerated     7.  Elevated LFTs during hospitalization for right upper quadrant pain.  She has no more pain since discharge and after ERCP.  Recent LFTs are downtrending but not back to normal.  Recheck LFTs in 4 weeks.    No orders of the defined types were placed in this encounter.      Meds & Refills for this Visit:  Requested Prescriptions      No prescriptions requested or ordered in this encounter       Imaging & Consults:  None

## 2025-02-06 ENCOUNTER — OFFICE VISIT (OUTPATIENT)
Dept: INTERNAL MEDICINE CLINIC | Facility: CLINIC | Age: 65
End: 2025-02-06
Payer: COMMERCIAL

## 2025-02-06 VITALS
WEIGHT: 183 LBS | TEMPERATURE: 98 F | SYSTOLIC BLOOD PRESSURE: 116 MMHG | OXYGEN SATURATION: 98 % | BODY MASS INDEX: 27.11 KG/M2 | DIASTOLIC BLOOD PRESSURE: 74 MMHG | HEIGHT: 69 IN | HEART RATE: 64 BPM

## 2025-02-06 DIAGNOSIS — E03.9 HYPOTHYROIDISM, UNSPECIFIED TYPE: ICD-10-CM

## 2025-02-06 DIAGNOSIS — F41.9 ANXIETY: ICD-10-CM

## 2025-02-06 DIAGNOSIS — Z00.00 PHYSICAL EXAM: Primary | ICD-10-CM

## 2025-02-06 DIAGNOSIS — F40.243 FEAR OF FLYING: ICD-10-CM

## 2025-02-06 DIAGNOSIS — Z12.31 ENCOUNTER FOR SCREENING MAMMOGRAM FOR MALIGNANT NEOPLASM OF BREAST: ICD-10-CM

## 2025-02-06 DIAGNOSIS — R79.89 ELEVATED LFTS: ICD-10-CM

## 2025-02-06 DIAGNOSIS — Z90.49 STATUS POST CHOLECYSTECTOMY: ICD-10-CM

## 2025-02-06 PROCEDURE — 87624 HPV HI-RISK TYP POOLED RSLT: CPT | Performed by: FAMILY MEDICINE

## 2025-02-06 PROCEDURE — 99396 PREV VISIT EST AGE 40-64: CPT | Performed by: FAMILY MEDICINE

## 2025-02-06 PROCEDURE — 88175 CYTOPATH C/V AUTO FLUID REDO: CPT | Performed by: FAMILY MEDICINE

## 2025-02-06 PROCEDURE — 87625 HPV TYPES 16 & 18 ONLY: CPT | Performed by: FAMILY MEDICINE

## 2025-02-06 RX ORDER — ALPRAZOLAM 0.25 MG/1
0.25 TABLET ORAL 2 TIMES DAILY PRN
Qty: 20 TABLET | Refills: 0 | Status: SHIPPED | OUTPATIENT
Start: 2025-02-06

## 2025-02-06 RX ORDER — LEVOTHYROXINE SODIUM 88 UG/1
88 TABLET ORAL
Qty: 90 TABLET | Refills: 3 | Status: SHIPPED | OUTPATIENT
Start: 2025-02-06

## 2025-02-07 ENCOUNTER — MED REC SCAN ONLY (OUTPATIENT)
Dept: INTERNAL MEDICINE CLINIC | Facility: CLINIC | Age: 65
End: 2025-02-07

## 2025-02-07 LAB — HPV E6+E7 MRNA CVX QL NAA+PROBE: POSITIVE

## 2025-02-10 LAB — LAST PAP RESULT: NORMAL

## 2025-02-11 LAB
HPV16 DNA CVX QL PROBE+SIG AMP: NEGATIVE
HPV18 DNA CVX QL PROBE+SIG AMP: NEGATIVE

## 2025-03-17 ENCOUNTER — OFFICE VISIT (OUTPATIENT)
Dept: OBGYN CLINIC | Facility: CLINIC | Age: 65
End: 2025-03-17

## 2025-03-17 VITALS
HEIGHT: 69 IN | WEIGHT: 186 LBS | HEART RATE: 72 BPM | SYSTOLIC BLOOD PRESSURE: 104 MMHG | BODY MASS INDEX: 27.55 KG/M2 | DIASTOLIC BLOOD PRESSURE: 65 MMHG

## 2025-03-17 DIAGNOSIS — R87.618 OTHER ABNORMAL CYTOLOGICAL FINDING OF SPECIMEN FROM CERVIX: Primary | ICD-10-CM

## 2025-03-17 PROCEDURE — 99204 OFFICE O/P NEW MOD 45 MIN: CPT | Performed by: STUDENT IN AN ORGANIZED HEALTH CARE EDUCATION/TRAINING PROGRAM

## 2025-03-17 NOTE — PATIENT INSTRUCTIONS
Consider starting a mushroom extract supplement called AHCC. You may purchase on amazon. You would take 3 grams daily for 6 months. This mushroom extract is in clinical trials with the FDA but has shown promise to resolve HPV virus.        COLPOSCOPY EXAMINATION    You have been referred for a Colposcopy examination due to some changes seen on your recent Pap smear. This does not mean that there is a serious problem, but rather that an exam to take a closer look at the cervix is needed.      This examination is done just as in a usual pelvic exam, except that a colposcope (a low power microscope) allows the doctor to see the cervix and vaginal area in greater detail. The colposcope does not enter your body. As in a routine Pap examination, the doctor will insert a speculum into the vagina and will then look through the colposcope. The examination is not painful. A dilute solution of white vinegar is usually swabbed onto the cervix and vagina before looking through the colposcope. This makes it easier to see any possibly abnormal areas.    Looking through the colposcope, the doctor may see areas that appear unusual. If so, a biopsy may be done. A biopsy is the removal of a tiny amount of tissue (about the size of a grain of rice). Biopsies can be done with little discomfort, because the cervix has few nerve cells. Any bleeding is controlled with pressure from a soft cotton swab or by applying a small amount of a paste medication. This biopsy is then sent to the lab to be examined for any abnormality.    During the examination an iodine solution may be applied to the cervix to outline normal tissue. As a result, you may see brown or reddish discharge for a week or more. This is expected and will go away by itself.     To allow for healing, you should not have sexual intercourse for 72 hours following the examination, or as directed by your physician or nurse practitioner.

## 2025-03-17 NOTE — PROGRESS NOTES
[facility]  Obstetrics and Gynecology  Focused Gynecology Problem Exam      Parris Paredes is a 64 year old female presenting for Consult (Np, Abnormal pap smear )  .    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 20 tablet 0    levothyroxine 88 MCG Oral Tab Take 1 tablet (88 mcg total) by mouth before breakfast. 90 tablet 3    acetaminophen 500 MG Oral Tab Take 2 tablets (1,000 mg total) by mouth every 4 (four) hours as needed for Pain. 30 tablet 0    semaglutide-weight management (WEGOVY) 2.4 MG/0.75ML Subcutaneous Solution Auto-injector Inject 0.75 mL (2.4 mg total) into the skin once a week. 9 mL 1    Meloxicam 15 MG Oral Tab Take 1 tablet (15 mg total) by mouth daily as needed for Pain. 30 tablet 0    predniSONE 20 MG Oral Tab Take 1 tablet (20 mg total) by mouth daily. 5 tablet 0     Allergies:  Allergies[1]  HISTORY:     OB History    Para Term  AB Living   2 0     1 2   SAB IAB Ectopic Multiple Live Births   1        2      # Outcome Date GA Lbr Victor M/2nd Weight Sex Type Anes PTL Lv   2             1 SAB                  Past Medical History:    Anxiety    Disorder of thyroid    Thyroid disease    Visual impairment       Past Surgical History:   Procedure Laterality Date    Cholecystectomy  2024    laparoscopic cholecystectomy    Knee surgery Bilateral     Faviola biopsy stereo nodule 1 site right (cpt=19081)  2015       Family History   Problem Relation Age of Onset    Hypertension Father     Heart Disease Father     Other (afib) Father          95       Social History     Socioeconomic History    Marital status:      Spouse name: Not on file    Number of children: Not on file    Years of education: Not on file    Highest education level: Not on file   Occupational History    Not on file   Tobacco Use    Smoking status: Never    Smokeless tobacco: Never   Vaping Use    Vaping status: Never Used   Substance and Sexual Activity    Alcohol use: Yes     Alcohol/week: 0.0 standard drinks of alcohol     Comment: Socially     Drug use: No    Sexual activity: Not on file   Other Topics Concern     Service Not Asked    Blood Transfusions Not Asked    Caffeine Concern Yes     Comment: Coffee, soda - 4 cups per day     Occupational Exposure Not Asked    Hobby Hazards Not Asked    Sleep Concern Not Asked    Stress Concern Not Asked    Weight Concern Not Asked    Special Diet Not Asked    Back Care Not Asked    Exercise Not Asked    Bike Helmet Not Asked    Seat Belt Not Asked    Self-Exams Not Asked   Social History Narrative    Not on file     Social Drivers of Health     Food Insecurity: No Food Insecurity (2025)    Food Insecurity     Food Insecurity: Never true   Transportation Needs: No Transportation Needs (2025)    Transportation Needs     Lack of Transportation: No     Car Seat: Not on file   Stress: Not on file   Housing Stability: Low Risk  (2025)    Housing Stability     Housing  Instability: No     Housing Instability Emergency: Not on file     Crib or Bassinette: Not on file       ROS:   Review of Systems:    Constitutional:    denies fever / chills  Eyes:     denies blurred or double vision  Cardiovascular:  denies chest pain or palpitations  Respiratory:    denies shortness of breath  Gastrointestinal:  denies severe abdominal pain, frequent diarrhea or constipation, nausea / vomiting  Genitourinary:    denies dysuria, bothersome incontinence  Skin/Breast:   denies any breast pain, lumps, or discharge  Neurological:    denies frequent severe headaches  Psychiatric:   denies depression or anxiety, thoughts of harming self or others  Heme/Lymph:    denies easy bruising or bleeding  PHYSICAL EXAM:   /65   Pulse 72   Ht 5' 9\" (1.753 m)   Wt 186 lb (84.4 kg)   BMI 27.47 kg/m²     GENERAL: well developed, well nourished, in no apparent distress       ASSESSMENT:    Clinical hx and pap hx reviewed.  Recommend colposcopy  Basic pathophysiology of cervical dysplasia reviewed. Common tx options reviewed  All questions and concerns answered      ICD-10-CM    1. Other abnormal cytological finding of specimen from cervix  R87.618           PLAN:   Rtc for colposcopy      COLPOSCOPY EXAMINATION     You have been referred for a Colposcopy examination due to some changes seen on your recent Pap smear. This does not mean that there is a serious problem, but rather that an exam to take a closer look at the cervix is needed.       This examination is done just as in a usual pelvic exam, except that a colposcope (a low power microscope) allows the doctor to see the cervix and vaginal area in greater detail. The colposcope does not enter your body. As in a routine Pap examination, the doctor will insert a speculum into the vagina and will then look through the colposcope. The examination is not painful. A dilute solution of white vinegar is usually swabbed onto the cervix and vagina before looking  through the colposcope. This makes it easier to see any possibly abnormal areas.     Looking through the colposcope, the doctor may see areas that appear unusual. If so, a biopsy may be done. A biopsy is the removal of a tiny amount of tissue (about the size of a grain of rice). Biopsies can be done with little discomfort, because the cervix has few nerve cells. Any bleeding is controlled with pressure from a soft cotton swab or by applying a small amount of a paste medication. This biopsy is then sent to the lab to be examined for any abnormality.     During the examination an iodine solution may be applied to the cervix to outline normal tissue. As a result, you may see brown or reddish discharge for a week or more. This is expected and will go away by itself.      To allow for healing, you should not have sexual intercourse for 72 hours following the examination, or as directed by your physician or nurse practitioner.  ORDERS:   No orders of the defined types were placed in this encounter.    PRESCRIPTIONS:     Requested Prescriptions      No prescriptions requested or ordered in this encounter     IMAGING/ REFERRALS:    None     Total time spent 45 minutes this calendar day which includes preparing to see the patient including chart review, obtaining and/or reviewing additional medical history, performing a physical exam and evaluation, documenting clinical information in the electronic medical record, independently interpreting results, counseling the patient, communicating results to the patient/family/caregiver and coordinating care.         Sabina Castelan MD  3/17/2025  9:39 AM               [1] No Known Allergies

## 2025-03-18 ENCOUNTER — HOSPITAL ENCOUNTER (OUTPATIENT)
Dept: MAMMOGRAPHY | Age: 65
Discharge: HOME | End: 2025-03-18
Attending: FAMILY MEDICINE
Payer: COMMERCIAL

## 2025-03-18 DIAGNOSIS — Z12.31 ENCOUNTER FOR SCREENING MAMMOGRAM FOR MALIGNANT NEOPLASM OF BREAST: ICD-10-CM

## 2025-03-18 PROCEDURE — 77063 BREAST TOMOSYNTHESIS BI: CPT | Performed by: FAMILY MEDICINE

## 2025-03-18 PROCEDURE — 77067 SCR MAMMO BI INCL CAD: CPT | Performed by: FAMILY MEDICINE

## 2025-03-21 ENCOUNTER — OFFICE VISIT (OUTPATIENT)
Dept: SURGERY | Facility: CLINIC | Age: 65
End: 2025-03-21
Payer: COMMERCIAL

## 2025-03-21 VITALS
BODY MASS INDEX: 28.13 KG/M2 | WEIGHT: 185.63 LBS | SYSTOLIC BLOOD PRESSURE: 100 MMHG | DIASTOLIC BLOOD PRESSURE: 70 MMHG | HEIGHT: 68.3 IN | RESPIRATION RATE: 99 BRPM | HEART RATE: 66 BPM

## 2025-03-21 DIAGNOSIS — F41.9 ANXIETY: ICD-10-CM

## 2025-03-21 DIAGNOSIS — E03.9 HYPOTHYROIDISM, UNSPECIFIED TYPE: ICD-10-CM

## 2025-03-21 DIAGNOSIS — Z51.81 ENCOUNTER FOR THERAPEUTIC DRUG MONITORING: ICD-10-CM

## 2025-03-21 DIAGNOSIS — E78.5 DYSLIPIDEMIA: Primary | ICD-10-CM

## 2025-03-21 DIAGNOSIS — E66.9 OBESITY (BMI 30-39.9): ICD-10-CM

## 2025-03-21 PROCEDURE — 99214 OFFICE O/P EST MOD 30 MIN: CPT | Performed by: NURSE PRACTITIONER

## 2025-03-21 RX ORDER — SEMAGLUTIDE 2.4 MG/.75ML
2.4 INJECTION, SOLUTION SUBCUTANEOUS WEEKLY
Qty: 9 ML | Refills: 1 | Status: SHIPPED | OUTPATIENT
Start: 2025-03-21

## 2025-03-21 NOTE — PROGRESS NOTES
[facility], [facility], [place]  [address]  [id number]  Dept: [phone]       Patient:  Parris Paredes  :      1960  MRN:      [id number]    Chief Complaint:    Chief Complaint   Patient presents with    Follow - Up    Weight Management    Obesity       SUBJECTIVE     History of Present Illness:  Parris is being seen today for a follow-up for medically supported weight loss.      S/p cholecystectomy with f/u ERCP.  Now doing well.  She is now back on Wegovy 2.4 mg weekly.    Initial HPI:  63 yo female.   Presents to clinic for assistance with weight loss/management.    Patient is considering medications and is not currently a candidate for bariatric surgery for weight loss.    Patient denies any history of eating disorder(s).    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  Patient lives with daughter (25 year old). 3 children total.    Patient's goal weight: 170-180 lbs  Biggest weight loss in the past:    How weight loss was achieved: WW  Heaviest weight ever: Current   Previous use of medical weight loss medications: Stimulant a few months- no significant weight change    Physical activity:  2 days/week; weight training; walks 1 day/week     Sleep: 7-8 hours/night    Sleep screening: planning sleep study       Past Medical History:   Past Medical History:    Anxiety    Disorder of thyroid    Thyroid disease    Visual impairment        Comorbidities:  Hyperlipidemia-Improvement?  yes    OBJECTIVE     Vitals: /70 (BP Location: Left arm, Patient Position: Sitting, Cuff Size: adult)   Pulse 66   Resp (!) 99   Ht 5' 8.3\" (1.735 m)   Wt 185 lb 9.6 oz (84.2 kg)   BMI 27.97 kg/m²     Initial weight loss: -11   Total weight loss: -68   Start weight: 254   Weight loss % to date: 26.77%    Wt Readings from Last 6 Encounters:   25 185 lb 9.6 oz (84.2 kg)   25 186 lb (84.4 kg)   25 183 lb (83  - continue home meds     kg)   25 186 lb 8 oz (84.6 kg)   24 195 lb (88.5 kg)   11/15/24 196 lb (88.9 kg)       Patient Medications:    Current Outpatient Medications   Medication Sig Dispense Refill    semaglutide-weight management (WEGOVY) 2.4 MG/0.75ML Subcutaneous Solution Auto-injector Inject 0.75 mL (2.4 mg total) into the skin once a week. 9 mL 1    ALPRAZolam (XANAX) 0.25 MG Oral Tab Take 1 tablet (0.25 mg total) by mouth 2 (two) times daily as needed for Anxiety. 20 tablet 0    levothyroxine 88 MCG Oral Tab Take 1 tablet (88 mcg total) by mouth before breakfast. 90 tablet 3    acetaminophen 500 MG Oral Tab Take 2 tablets (1,000 mg total) by mouth every 4 (four) hours as needed for Pain. 30 tablet 0    predniSONE 20 MG Oral Tab Take 1 tablet (20 mg total) by mouth daily. 5 tablet 0    Meloxicam 15 MG Oral Tab Take 1 tablet (15 mg total) by mouth daily as needed for Pain. 30 tablet 0     Allergies:  Patient has no known allergies.     Social History:  Reviewed    Surgical History:    Past Surgical History:   Procedure Laterality Date    Cholecystectomy  2024    laparoscopic cholecystectomy    Knee surgery Bilateral     Faviola biopsy stereo nodule 1 site right (cpt=19081)       Family History:    Family History   Problem Relation Age of Onset    Hypertension Father     Heart Disease Father     Other (afib) Father          95     Initial Intake:  Likes eating, likes going out to eat   After dinner behavior: + ishan crackers, 100 calorie bar  Night eating: -  Portion sizes: occasional    Binge: -  Emotional: -  Depression: Score: 1  Grazing: -  Sweet tooth: +  Crunchy/salty: -  Etoh: 3-6 drinks/week; socially  Good water intake, 3 cups coffee/day   Soda Drinker: Yes                 If yes, how much?:  Diet coke every other day [employer]   Sports Drinks:  No                  Juice:  No                     Number of restaurant or fast food meals/week: 2 meals/week  Very limited FF      Food Journal  Reviewed  and Discussed:       Patient has a Food Journal?: yes MFP  Patient is reading nutrition labels?  yes  Average Caloric Intake: 5340-7219/day     Average CHO Intake:   Protein goal: 80-90 g/day  Is patient exercising? yes  Type of exercise? Trainer 2 days/week; weights  Less walking    Eating Habits  Patient states the following:  Eats 3 meal(s) per day  Length of time it takes to consume a meal:    # of snacks per day:    Type of snacks:    Amount of soda consumption per day:  Decreased significantly   Amount of water (in ounces) per day:   Toughest challenge:     B: oatmeal; eggs; kodiak waffle  L: protein shake with produce  D: protein, starch, vegetables    Met with NEYMAR Wilcox in the past     Nutritional Goals  Eat 3-4 cups of fresh fruits or vegetables daily    Behavior Modifications Reviewed and Discussed  Eat breakfast, Eat 3 meals per day, Plan meals in advance, Read nutrition labels, Drink 64 oz of water per day, Maintain a daily food journal, Utlize portion control strategies to reduce calorie intake, Identify triggers for eating and manage cues and Eat slowly and take 20 to 30 minutes to complete each meal    Exercise Goals Reviewed and Discussed:    Increase walking   Aim for 150 minutes moderate level exercise weekly with 2-3 days strength training as tolerated     ROS:    Constitutional: negative  Respiratory: negative  Cardiovascular: positive for lower extremity edema and occasionally   Gastrointestinal: positive for reflux symptoms and with wine intake   Integument/breast: negative  Hematologic/lymphatic: negative  Musculoskeletal:positive for arthralgias and knees   Neurological: positive for headaches  Behavioral/Psych: negative  Endocrine: negative  All other systems were reviewed and are negative    Physical Exam:  General appearance: alert, appears stated age, cooperative and obese  Head: Normocephalic, without obvious abnormality, atraumatic  Neck: no adenopathy, no carotid bruit, no JVD, supple,  symmetrical, trachea midline and thyroid not enlarged, symmetric, no tenderness/mass/nodules  Lungs: clear to auscultation bilaterally  Heart: S1, S2 normal, no murmur, click, rub or gallop, regular rate and rhythm  Abdomen: soft, non-tender; bowel sounds normal; no masses,  no organomegaly and abdomen obese   Extremities: intact, no edema   Pulses: 2+ and symmetric  Skin: intact   Neurologic: Grossly normal      ASSESSMENT     Encounter Diagnosis(ses):   Encounter Diagnoses   Name Primary?    Dyslipidemia Yes    Encounter for therapeutic drug monitoring     Obesity (BMI 30-39.9)     Hypothyroidism, unspecified type     Anxiety        PLAN       Diagnoses and all orders for this visit:    Dyslipidemia    Encounter for therapeutic drug monitoring    Obesity (BMI 30-39.9)  -     semaglutide-weight management (WEGOVY) 2.4 MG/0.75ML Subcutaneous Solution Auto-injector; Inject 0.75 mL (2.4 mg total) into the skin once a week.    Hypothyroidism, unspecified type    Anxiety      DYSLIPIDEMIA: Now in a healthy range. Recommend dietary changes and lifestyle modifications as discussed below. Monitor.       Lab Results   Component Value Date/Time    CHOLEST 182 01/31/2024 12:27 PM    LDL 98 01/31/2024 12:27 PM    HDL 69 (H) 01/31/2024 12:27 PM    TRIG 83 01/31/2024 12:27 PM    VLDL 14 01/31/2024 12:27 PM     OBESITY/WEIGHT GAIN:     Recommended patient continue intensive lifestyle and behavioral modifications at this time for weight loss.     Reviewed lifestyle modifications: Whole Food/Plant Strong/Low Glycemic Index diet, moderate alcohol consumption, reduced sodium intake to no more than 2,400 mg/day, and at least 150 minutes of moderate physical activity per week.   Avoid processed, poor quality carbohydrates, refined grains, flour, sugar.     Goals for next month:  1. Keep a food log.   2. Drink 64 ounces of non-caloric beverages per day. No fruit juices or regular soda.  3. Aim for 150 minutes moderate exercise per week.     4. Increase fruit and vegetable servings to 5-6 per day.    5. Improve sleep and stress.      Reviewed other labs:  12/1/22- Alk phosphatase mildly elevated- monitor.   CMP otherwise in range. Thyroid studies in range.   Elevated WBCs- monitor.  1/31/24- TSH, CMP, CBC in range. Non-fasting.   2/4/25- LFTs elevated. Monitor closely.   Update fasting labs ordered by pcp.    Discussed medication options for weight loss in detail with patient.      Denies personal or family hx medullary thyroid CA, endocrine neoplasia syndrome, pancreatitis hx, suicidal ideation. No renal impairment, severe GI disease, diabetes, pancreatitis risks noted.     Continue Wegovy 2.4 mg weekly.   Weight loss % to date: 26.77%.  S/p cholecystectomy with f/u ERCP.  Now doing well. Monitor closely.     *Consider evening topiramate.    SQ administration teaching provided to patient.   Discussed risks, benefits, and side effects of medication.     Met with integrative RD.   Aim for 70 grams protein/day.    Senna tea as needed and psyllium husk daily for constipation.     +insomnia.   Add daily vitamin D and calcium.    Goal: 170-180 lbs.      RTC 4 months.     HEAVENLY Bose

## 2025-03-31 ENCOUNTER — OFFICE VISIT (OUTPATIENT)
Dept: OBGYN CLINIC | Facility: CLINIC | Age: 65
End: 2025-03-31

## 2025-03-31 VITALS
HEIGHT: 68 IN | SYSTOLIC BLOOD PRESSURE: 108 MMHG | WEIGHT: 185 LBS | HEART RATE: 76 BPM | DIASTOLIC BLOOD PRESSURE: 61 MMHG | BODY MASS INDEX: 28.04 KG/M2

## 2025-03-31 DIAGNOSIS — Z01.812 PRE-PROCEDURAL LABORATORY EXAMINATION: ICD-10-CM

## 2025-03-31 DIAGNOSIS — R87.618 OTHER ABNORMAL CYTOLOGICAL FINDING OF SPECIMEN FROM CERVIX: Primary | ICD-10-CM

## 2025-03-31 DIAGNOSIS — N87.0 MILD DYSPLASIA OF CERVIX: ICD-10-CM

## 2025-03-31 NOTE — PROGRESS NOTES
Colposcopy    Procedure:  1) Colposcopy   2) Endocervical curettage    Indication:   2/2025: NIL, HPV+  2/2024: NIL, HPV+  7/2020: NIL, HPV neg  6/2016: NIL, HPV neg    Pre procedure:   Informed consent obtained?: y  Cervical cancer screening history:  reviewed  Immunosuppressed?: n  Smoker?:n  Recent pelvic infection: n  Gardasil status (can get until age 44 yo): n/a    Procedure: After neg UPT, patient was placed in lithotomy.  Speculum was inserted in the vagina.  Vaginal walls and cervix were washed with acetic acid.  SCJ was visualized.  Biopsies were taken from 12, 3, 6 and 9 o clock.  ECC was done.  Monsel's was used for hemostasis.  Patient tolerated procedure well.    Findings:   - normal appearing external genitalia  - normal appearing, well estrogenized vaginal walls  - pre-acetic acid:  cervix flush to vagina without lesions or masses  - post-acetic acid: acetowhite seen at 6 oclock and 12o'clock within TMZ    Impression: DAE 1    Post Colpo Instructions:  - Experiencing vaginal pain for a few days is normal  - Use a pad to catch any blood or discharge  - Call office if bleeding heavier than a period, chills, fever or severe abdominal pain  - Pelvic rest (nothing in the vagina - douching, vaginal sex or tampons) x1 week  - Patient will be notified of findings and follow up either via the patient portal or a phone call     Sabina Castelan MD

## 2025-04-01 ENCOUNTER — TELEPHONE (OUTPATIENT)
Dept: OBGYN CLINIC | Facility: CLINIC | Age: 65
End: 2025-04-01

## 2025-04-01 NOTE — TELEPHONE ENCOUNTER
Pt name and  verified     Pt informed of results and recommendations. Pt verbalized understanding and agreed.  Routed to pap follow up pool.

## 2025-04-01 NOTE — TELEPHONE ENCOUNTER
----- Message from Sabina Castelan MD sent at 4/1/2025  2:25 PM CDT -----  S/p colpo - DAE 1, plan for repeat pap in one year, due 3/2026. Thanks.

## 2025-04-05 ENCOUNTER — LAB ENCOUNTER (OUTPATIENT)
Dept: LAB | Facility: HOSPITAL | Age: 65
End: 2025-04-05
Attending: FAMILY MEDICINE
Payer: COMMERCIAL

## 2025-04-05 DIAGNOSIS — Z00.00 PHYSICAL EXAM: ICD-10-CM

## 2025-04-05 LAB
ALBUMIN SERPL-MCNC: 4.3 G/DL (ref 3.2–4.8)
ALBUMIN/GLOB SERPL: 1.8 {RATIO} (ref 1–2)
ALP LIVER SERPL-CCNC: 100 U/L
ALT SERPL-CCNC: 13 U/L
ANION GAP SERPL CALC-SCNC: 10 MMOL/L (ref 0–18)
AST SERPL-CCNC: 19 U/L (ref ?–34)
BASOPHILS # BLD AUTO: 0.05 X10(3) UL (ref 0–0.2)
BASOPHILS NFR BLD AUTO: 0.4 %
BILIRUB SERPL-MCNC: 0.5 MG/DL (ref 0.2–1.1)
BUN BLD-MCNC: 18 MG/DL (ref 9–23)
BUN/CREAT SERPL: 22.5 (ref 10–20)
CALCIUM BLD-MCNC: 9.3 MG/DL (ref 8.7–10.4)
CHLORIDE SERPL-SCNC: 107 MMOL/L (ref 98–112)
CHOLEST SERPL-MCNC: 184 MG/DL (ref ?–200)
CO2 SERPL-SCNC: 26 MMOL/L (ref 21–32)
CREAT BLD-MCNC: 0.8 MG/DL
DEPRECATED RDW RBC AUTO: 45.3 FL (ref 35.1–46.3)
EGFRCR SERPLBLD CKD-EPI 2021: 83 ML/MIN/1.73M2 (ref 60–?)
EOSINOPHIL # BLD AUTO: 0.1 X10(3) UL (ref 0–0.7)
EOSINOPHIL NFR BLD AUTO: 0.8 %
ERYTHROCYTE [DISTWIDTH] IN BLOOD BY AUTOMATED COUNT: 13.3 % (ref 11–15)
EST. AVERAGE GLUCOSE BLD GHB EST-MCNC: 103 MG/DL (ref 68–126)
FASTING PATIENT LIPID ANSWER: YES
FASTING STATUS PATIENT QL REPORTED: YES
GLOBULIN PLAS-MCNC: 2.4 G/DL (ref 2–3.5)
GLUCOSE BLD-MCNC: 86 MG/DL (ref 70–99)
HBA1C MFR BLD: 5.2 % (ref ?–5.7)
HCT VFR BLD AUTO: 40.9 %
HDLC SERPL-MCNC: 78 MG/DL (ref 40–59)
HGB BLD-MCNC: 13.4 G/DL
IMM GRANULOCYTES # BLD AUTO: 0.03 X10(3) UL (ref 0–1)
IMM GRANULOCYTES NFR BLD: 0.3 %
LDLC SERPL CALC-MCNC: 90 MG/DL (ref ?–100)
LYMPHOCYTES # BLD AUTO: 4.36 X10(3) UL (ref 1–4)
LYMPHOCYTES NFR BLD AUTO: 36.5 %
MCH RBC QN AUTO: 30.1 PG (ref 26–34)
MCHC RBC AUTO-ENTMCNC: 32.8 G/DL (ref 31–37)
MCV RBC AUTO: 91.9 FL
MONOCYTES # BLD AUTO: 0.84 X10(3) UL (ref 0.1–1)
MONOCYTES NFR BLD AUTO: 7 %
NEUTROPHILS # BLD AUTO: 6.58 X10 (3) UL (ref 1.5–7.7)
NEUTROPHILS # BLD AUTO: 6.58 X10(3) UL (ref 1.5–7.7)
NEUTROPHILS NFR BLD AUTO: 55 %
NONHDLC SERPL-MCNC: 106 MG/DL (ref ?–130)
OSMOLALITY SERPL CALC.SUM OF ELEC: 297 MOSM/KG (ref 275–295)
PLATELET # BLD AUTO: 276 10(3)UL (ref 150–450)
POTASSIUM SERPL-SCNC: 3.9 MMOL/L (ref 3.5–5.1)
PROT SERPL-MCNC: 6.7 G/DL (ref 5.7–8.2)
RBC # BLD AUTO: 4.45 X10(6)UL
SODIUM SERPL-SCNC: 143 MMOL/L (ref 136–145)
TRIGL SERPL-MCNC: 89 MG/DL (ref 30–149)
TSI SER-ACNC: 0.84 UIU/ML (ref 0.55–4.78)
VLDLC SERPL CALC-MCNC: 14 MG/DL (ref 0–30)
WBC # BLD AUTO: 12 X10(3) UL (ref 4–11)

## 2025-04-05 PROCEDURE — 36415 COLL VENOUS BLD VENIPUNCTURE: CPT

## 2025-04-05 PROCEDURE — 80053 COMPREHEN METABOLIC PANEL: CPT

## 2025-04-05 PROCEDURE — 83036 HEMOGLOBIN GLYCOSYLATED A1C: CPT

## 2025-04-05 PROCEDURE — 80061 LIPID PANEL: CPT

## 2025-04-05 PROCEDURE — 84443 ASSAY THYROID STIM HORMONE: CPT

## 2025-04-05 PROCEDURE — 85025 COMPLETE CBC W/AUTO DIFF WBC: CPT

## 2025-04-14 ENCOUNTER — LAB ENCOUNTER (OUTPATIENT)
Dept: LAB | Facility: HOSPITAL | Age: 65
End: 2025-04-14
Attending: SPECIALIST
Payer: COMMERCIAL

## 2025-04-14 ENCOUNTER — OFFICE VISIT (OUTPATIENT)
Dept: OTOLARYNGOLOGY | Facility: CLINIC | Age: 65
End: 2025-04-14
Payer: COMMERCIAL

## 2025-04-14 VITALS — BODY MASS INDEX: 28.04 KG/M2 | WEIGHT: 185 LBS | HEIGHT: 68 IN

## 2025-04-14 DIAGNOSIS — J34.3 NASAL TURBINATE HYPERTROPHY: ICD-10-CM

## 2025-04-14 DIAGNOSIS — J30.9 ALLERGIC RHINITIS WITH POSTNASAL DRIP: Primary | ICD-10-CM

## 2025-04-14 DIAGNOSIS — H69.92 DYSFUNCTION OF LEFT EUSTACHIAN TUBE: ICD-10-CM

## 2025-04-14 DIAGNOSIS — H65.22 CHRONIC SEROUS OTITIS MEDIA OF LEFT EAR: ICD-10-CM

## 2025-04-14 DIAGNOSIS — R09.82 ALLERGIC RHINITIS WITH POSTNASAL DRIP: Primary | ICD-10-CM

## 2025-04-14 PROCEDURE — 36415 COLL VENOUS BLD VENIPUNCTURE: CPT | Performed by: SPECIALIST

## 2025-04-14 PROCEDURE — 86003 ALLG SPEC IGE CRUDE XTRC EA: CPT | Performed by: SPECIALIST

## 2025-04-14 PROCEDURE — 82785 ASSAY OF IGE: CPT | Performed by: SPECIALIST

## 2025-04-14 RX ORDER — PREDNISONE 20 MG/1
20 TABLET ORAL DAILY
Qty: 3 TABLET | Refills: 0 | Status: SHIPPED | OUTPATIENT
Start: 2025-04-14

## 2025-04-14 RX ORDER — AZITHROMYCIN 250 MG/1
TABLET, FILM COATED ORAL
Qty: 11 TABLET | Refills: 0 | Status: SHIPPED | OUTPATIENT
Start: 2025-04-14

## 2025-04-14 NOTE — PROGRESS NOTES
Parris Paredes is a 64 year old female.   Chief Complaint   Patient presents with    Ear Problem     Swollen eustachian tube     HPI:   Patient here is had recurrent issues with her eustachian tube on the left.  This has now been going on intermittently but more persistently for the past 1 year.    Current Medications[1]   Past Medical History[2]   Social History:  Short Social Hx on File[3]     REVIEW OF SYSTEMS:   GENERAL HEALTH: feels well otherwise  GENERAL : denies fever, chills, sweats, weight loss, weight gain  SKIN: denies any unusual skin lesions or rashes  RESPIRATORY: denies shortness of breath with exertion  NEURO: denies headaches    EXAM:   Ht 5' 8\" (1.727 m)   Wt 185 lb (83.9 kg)   BMI 28.13 kg/m²   System Details   Skin Inspection - Normal.   Constitutional Overall appearance - Normal.   Head/Face Facial features - Normal. Eyebrows - Normal. Skull - Normal.   Eyes Conjunctiva - Right: Normal, Left: Normal. Pupil - Right: Normal, Left: Normal.    Ears Inspection - Right: Normal, Left: Normal.   Canal - Right: Normal, Left: Normal.   TM - Right: Normal, Left: Retracted left tympanic membrane.  Able to equalize with positive pressure.  About 50% fluid remained.   Nasal External nose - Normal.   Consent was obtained.  The nasal cavity was anesthetized with 1% neosynephrine and 4% lidocaine.  The bilateral nares were examined from the nasal vestibule to the nasopharynx.  Areas examined include the nasal floor, turbinates, superior, middle, and inferior meatus, the nasal septum, sphenoethmoid recess, eustation tube and nasopharynx.  All abnormalities are listed in the exam section.  Congestion of the turbinates.  No purulence or polyps noted.   Oral/Oropharynx Lips - Normal, Tonsils - Normal, Tongue - Normal    Neck Exam Inspection - Normal. Palpation - Normal. Parotid gland - Normal. Thyroid gland - Normal.   Lymph Detail Submental. Submandibular. Anterior cervical. Posterior cervical. Supraclavicular  all without enlargement   Nasopharynx Congested eustachian tube orifices by fiberoptic examination.  No nasopharyngeal masses.   Psychiatric Orientation - Oriented to time, place, person & situation. Appropriate mood and affect.   Neurological Memory - Normal. Cranial nerves - Cranial nerves II through XII grossly intact.     ASSESSMENT AND PLAN:   1. Allergic rhinitis with postnasal drip  Sent for allergy testing.  Patient to continue Zyrtec and Flonase.  Be sure to aim the Flonase away from the septum.  - Allergens, Zone 8    2. Dysfunction of left eustachian tube  Retracted and recurrent left serous otitis media    3. Chronic serous otitis media of left ear  Placed on 3 days of prednisone and 10 days of azithromycin.    4. Nasal turbinate hypertrophy  Turbinate congestion as well as eustachian tube congestion.  Noted by fiberoptic examination.      The patient indicates understanding of these issues and agrees to the plan.      Divya Lomeli MD  4/14/2025  1:27 PM       [1]   Current Outpatient Medications   Medication Sig Dispense Refill    predniSONE 20 MG Oral Tab Take 1 tablet (20 mg total) by mouth daily. 3 tablet 0    azithromycin (ZITHROMAX Z-NOHEMY) 250 MG Oral Tab Take 1 by oral route every day for 10 days. 2 tablets today. 11 tablet 0    semaglutide-weight management (WEGOVY) 2.4 MG/0.75ML Subcutaneous Solution Auto-injector Inject 0.75 mL (2.4 mg total) into the skin once a week. 9 mL 1    ALPRAZolam (XANAX) 0.25 MG Oral Tab Take 1 tablet (0.25 mg total) by mouth 2 (two) times daily as needed for Anxiety. 20 tablet 0    levothyroxine 88 MCG Oral Tab Take 1 tablet (88 mcg total) by mouth before breakfast. 90 tablet 3    acetaminophen 500 MG Oral Tab Take 2 tablets (1,000 mg total) by mouth every 4 (four) hours as needed for Pain. 30 tablet 0    Meloxicam 15 MG Oral Tab Take 1 tablet (15 mg total) by mouth daily as needed for Pain. 30 tablet 0    predniSONE 20 MG Oral Tab Take 1 tablet (20 mg total) by  mouth daily. 5 tablet 0   [2]   Past Medical History:   Anxiety    Disorder of thyroid    Thyroid disease    Visual impairment   [3]   Social History  Socioeconomic History    Marital status:    Tobacco Use    Smoking status: Never    Smokeless tobacco: Never   Vaping Use    Vaping status: Never Used   Substance and Sexual Activity    Alcohol use: Yes     Alcohol/week: 0.0 standard drinks of alcohol     Comment: Socially     Drug use: No   Other Topics Concern    Caffeine Concern Yes     Comment: Coffee, soda - 4 cups per day      Social Drivers of Health     Food Insecurity: No Food Insecurity (1/26/2025)    Food Insecurity     Food Insecurity: Never true   Transportation Needs: No Transportation Needs (1/26/2025)    Transportation Needs     Lack of Transportation: No   Housing Stability: Low Risk  (1/26/2025)    Housing Stability     Housing Instability: No

## 2025-04-14 NOTE — PATIENT INSTRUCTIONS
You had a left serous otitis media and severe congestion both of the nose and the eustachian tube areas.  I placed you on prednisone for 3 days and azithromycin for 10 days.  Please continue the Zyrtec and Flonase.  Be sure to aim the Flonase away from the septum.  Follow-up in 3 to 4 weeks time, sooner if problems.  I also sent you for allergy testing and we were able to equalize your middle ear with positive pressure.

## 2025-04-19 LAB

## 2025-05-19 ENCOUNTER — OFFICE VISIT (OUTPATIENT)
Dept: OTOLARYNGOLOGY | Facility: CLINIC | Age: 65
End: 2025-05-19
Payer: COMMERCIAL

## 2025-05-19 DIAGNOSIS — H65.22 CHRONIC SEROUS OTITIS MEDIA OF LEFT EAR: Primary | ICD-10-CM

## 2025-05-19 DIAGNOSIS — H69.92 DYSFUNCTION OF LEFT EUSTACHIAN TUBE: ICD-10-CM

## 2025-05-19 DIAGNOSIS — J34.3 NASAL TURBINATE HYPERTROPHY: ICD-10-CM

## 2025-05-19 PROCEDURE — 99213 OFFICE O/P EST LOW 20 MIN: CPT | Performed by: SPECIALIST

## 2025-05-19 RX ORDER — AZITHROMYCIN 250 MG/1
TABLET, FILM COATED ORAL
Qty: 11 TABLET | Refills: 0 | Status: SHIPPED | OUTPATIENT
Start: 2025-05-19

## 2025-05-19 NOTE — PATIENT INSTRUCTIONS
A refill of your azithromycin was sent to the pharmacy.  Your middle ear fluid although still present, continues to improve steadily.  You can use the Astepro 2 sprays twice a day along with the Flonase 1 spray twice daily for your nasal congestion.  Follow-up in 3 to 4 weeks time, sooner if problems.

## 2025-05-19 NOTE — PROGRESS NOTES
Parris Paredes is a 64 year old female.   Chief Complaint   Patient presents with    Follow - Up     F/up left ear clogged sensation  Pt reports some improvement     HPI:   Patient here for follow-up on her left serous otitis media.  Feels better but not resolved.    Current Medications[1]   Past Medical History[2]   Social History:  Short Social Hx on File[3]     REVIEW OF SYSTEMS:   GENERAL HEALTH: feels well otherwise  GENERAL : denies fever, chills, sweats, weight loss, weight gain  SKIN: denies any unusual skin lesions or rashes  RESPIRATORY: denies shortness of breath with exertion  NEURO: denies headaches    EXAM:   There were no vitals taken for this visit.  System Details   Skin Inspection - Normal.   Constitutional Overall appearance - Normal.   Head/Face Facial features - Normal. Eyebrows - Normal. Skull - Normal.   Eyes Conjunctiva - Right: Normal, Left: Normal. Pupil - Right: Normal, Left: Normal.    Ears Inspection - Right: Normal, Left: Normal.   Canal - Right: Normal, Left: Normal.   TM - Right: Normal, Left: Scant left serous otitis media.   Nasal External nose - Normal.   Nasal septum - Normal.  Turbinates -congestion, no purulence or polyps noted   Oral/Oropharynx Lips - Normal, Tonsils - Normal, Tongue - Normal    Neck Exam Inspection - Normal. Palpation - Normal. Parotid gland - Normal. Thyroid gland - Normal.   Lymph Detail Submental. Submandibular. Anterior cervical. Posterior cervical. Supraclavicular all without enlargement   Psychiatric Orientation - Oriented to time, place, person & situation. Appropriate mood and affect.   Neurological Memory - Normal. Cranial nerves - Cranial nerves II through XII grossly intact.     ASSESSMENT AND PLAN:   1. Chronic serous otitis media of left ear  Patient refill of azithromycin sent to the pharmacy.    2. Nasal turbinate hypertrophy  Can use Astepro nasal spray 2 sprays twice daily along with Flonase nasal spray 1 spray twice daily.    3. Dysfunction  of left eustachian tube  Improving steadily.  Still some fluid.  Follow-up in 3 to 4 weeks time, sooner if problems.      The patient indicates understanding of these issues and agrees to the plan.      Divya Lomeli MD  5/19/2025  6:36 PM       [1]   Current Outpatient Medications   Medication Sig Dispense Refill    azithromycin (ZITHROMAX Z-NOHEMY) 250 MG Oral Tab Take 1 by oral route every day for 10 days. 2 tablets today. 11 tablet 0    predniSONE 20 MG Oral Tab Take 1 tablet (20 mg total) by mouth daily. 3 tablet 0    semaglutide-weight management (WEGOVY) 2.4 MG/0.75ML Subcutaneous Solution Auto-injector Inject 0.75 mL (2.4 mg total) into the skin once a week. 9 mL 1    ALPRAZolam (XANAX) 0.25 MG Oral Tab Take 1 tablet (0.25 mg total) by mouth 2 (two) times daily as needed for Anxiety. 20 tablet 0    levothyroxine 88 MCG Oral Tab Take 1 tablet (88 mcg total) by mouth before breakfast. 90 tablet 3    acetaminophen 500 MG Oral Tab Take 2 tablets (1,000 mg total) by mouth every 4 (four) hours as needed for Pain. 30 tablet 0    predniSONE 20 MG Oral Tab Take 1 tablet (20 mg total) by mouth daily. 5 tablet 0    Meloxicam 15 MG Oral Tab Take 1 tablet (15 mg total) by mouth daily as needed for Pain. 30 tablet 0   [2]   Past Medical History:   Anxiety    Disorder of thyroid    Thyroid disease    Visual impairment   [3]   Social History  Socioeconomic History    Marital status:    Tobacco Use    Smoking status: Never    Smokeless tobacco: Never   Vaping Use    Vaping status: Never Used   Substance and Sexual Activity    Alcohol use: Yes     Alcohol/week: 0.0 standard drinks of alcohol     Comment: Socially     Drug use: No   Other Topics Concern    Caffeine Concern Yes     Comment: Coffee, soda - 4 cups per day      Social Drivers of Health     Food Insecurity: No Food Insecurity (1/26/2025)    Food Insecurity     Food Insecurity: Never true   Transportation Needs: No Transportation Needs (1/26/2025)     Transportation Needs     Lack of Transportation: No   Housing Stability: Low Risk  (1/26/2025)    Housing Stability     Housing Instability: No

## 2025-06-16 ENCOUNTER — OFFICE VISIT (OUTPATIENT)
Dept: AUDIOLOGY | Facility: CLINIC | Age: 65
End: 2025-06-16

## 2025-06-16 ENCOUNTER — OFFICE VISIT (OUTPATIENT)
Dept: OTOLARYNGOLOGY | Facility: CLINIC | Age: 65
End: 2025-06-16
Payer: COMMERCIAL

## 2025-06-16 DIAGNOSIS — H65.22 CHRONIC SEROUS OTITIS MEDIA OF LEFT EAR: Primary | ICD-10-CM

## 2025-06-16 DIAGNOSIS — H90.12 CONDUCTIVE HEARING LOSS OF LEFT EAR WITH UNRESTRICTED HEARING OF RIGHT EAR: Primary | ICD-10-CM

## 2025-06-16 PROCEDURE — 99213 OFFICE O/P EST LOW 20 MIN: CPT | Performed by: SPECIALIST

## 2025-06-16 PROCEDURE — 92557 COMPREHENSIVE HEARING TEST: CPT | Performed by: AUDIOLOGIST

## 2025-06-17 NOTE — PROGRESS NOTES
Parris Paredes is a 64 year old female.   Chief Complaint   Patient presents with    Follow - Up     Patient is here for 4 weeks ear reports ear clogged sometimes pain      HPI:   Patient is here had tried antibiotics and steroids without resolution of her left middle ear fluid and pain    Current Medications[1]   Past Medical History[2]   Social History:  Short Social Hx on File[3]     REVIEW OF SYSTEMS:   GENERAL HEALTH: feels well otherwise  GENERAL : denies fever, chills, sweats, weight loss, weight gain  SKIN: denies any unusual skin lesions or rashes  RESPIRATORY: denies shortness of breath with exertion  NEURO: denies headaches    EXAM:   There were no vitals taken for this visit.  System Details   Skin Inspection - Normal.   Constitutional Overall appearance - Normal.   Head/Face Facial features - Normal. Eyebrows - Normal. Skull - Normal.   Eyes Conjunctiva - Right: Normal, Left: Normal. Pupil - Right: Normal, Left: Normal.    Ears Inspection - Right: Normal, Left: Normal.   Canal - Right: Normal, Left: Normal.   TM - Right: Normal, Left: Retracted left tympanic membrane and about 80% fluid.   Nasal External nose - Normal.   Nasal septum - Normal.  Turbinates - Normal.   Oral/Oropharynx Lips - Normal, Tonsils - Normal, Tongue - Normal    Neck Exam Inspection - Normal. Palpation - Normal. Parotid gland - Normal. Thyroid gland - Normal.   Lymph Detail Submental. Submandibular. Anterior cervical. Posterior cervical. Supraclavicular all without enlargement   Psychiatric Orientation - Oriented to time, place, person & situation. Appropriate mood and affect.   Neurological Memory - Normal. Cranial nerves - Cranial nerves II through XII grossly intact.     ASSESSMENT AND PLAN:   1. Chronic serous otitis media of left ear  As above.  He has been on antibiotics and steroids in the past.  Would like to proceed with a PE tube.  This was scheduled for 1 week's time with the patient as she did not have time on the  date of her current visit.  Risks and benefits explained to her.  Prior exam showing no nasopharyngeal mass.  Audiogram reviewed with the patient at the time of the visit showing a mixed hearing loss on the left ear.  A copy of the audiogram was given to the patient at the time of the visit.  - Audiology Referral - [place] ([facility])      The patient indicates understanding of these issues and agrees to the plan.      Divya Lomeli MD  6/16/2025  8:32 PM       [1]   Current Outpatient Medications   Medication Sig Dispense Refill    azithromycin (ZITHROMAX Z-NOHEMY) 250 MG Oral Tab Take 1 by oral route every day for 10 days. 2 tablets today. 11 tablet 0    predniSONE 20 MG Oral Tab Take 1 tablet (20 mg total) by mouth daily. 3 tablet 0    semaglutide-weight management (WEGOVY) 2.4 MG/0.75ML Subcutaneous Solution Auto-injector Inject 0.75 mL (2.4 mg total) into the skin once a week. 9 mL 1    ALPRAZolam (XANAX) 0.25 MG Oral Tab Take 1 tablet (0.25 mg total) by mouth 2 (two) times daily as needed for Anxiety. 20 tablet 0    levothyroxine 88 MCG Oral Tab Take 1 tablet (88 mcg total) by mouth before breakfast. 90 tablet 3    acetaminophen 500 MG Oral Tab Take 2 tablets (1,000 mg total) by mouth every 4 (four) hours as needed for Pain. 30 tablet 0    Meloxicam 15 MG Oral Tab Take 1 tablet (15 mg total) by mouth daily as needed for Pain. 30 tablet 0   [2]   Past Medical History:   Anxiety    Disorder of thyroid    Thyroid disease    Visual impairment   [3]   Social History  Socioeconomic History    Marital status:    Tobacco Use    Smoking status: Never    Smokeless tobacco: Never   Vaping Use    Vaping status: Never Used   Substance and Sexual Activity    Alcohol use: Yes     Alcohol/week: 0.0 standard drinks of alcohol     Comment: Socially     Drug use: No   Other Topics Concern    Caffeine Concern Yes     Comment: Coffee, soda - 4 cups per day      Social Drivers of Health     Food Insecurity: No Food  Insecurity (1/26/2025)    Food Insecurity     Food Insecurity: Never true   Transportation Needs: No Transportation Needs (1/26/2025)    Transportation Needs     Lack of Transportation: No   Housing Stability: Low Risk  (1/26/2025)    Housing Stability     Housing Instability: No

## 2025-06-17 NOTE — PATIENT INSTRUCTIONS
You have a chronic left serous otitis media.  On the past exam your nasopharynx was negative for tumor.  Follow-up in 1 week's time to get a PE tube placed.

## 2025-06-23 ENCOUNTER — OFFICE VISIT (OUTPATIENT)
Dept: OTOLARYNGOLOGY | Facility: CLINIC | Age: 65
End: 2025-06-23
Payer: COMMERCIAL

## 2025-06-23 DIAGNOSIS — H65.22 CHRONIC SEROUS OTITIS MEDIA OF LEFT EAR: Primary | ICD-10-CM

## 2025-06-23 RX ORDER — OFLOXACIN 3 MG/ML
5 SOLUTION AURICULAR (OTIC) 2 TIMES DAILY
Qty: 5 ML | Refills: 0 | Status: SHIPPED | OUTPATIENT
Start: 2025-06-23 | End: 2025-07-03

## 2025-06-24 NOTE — PROGRESS NOTES
Parris Paredes is a 65 year old female.   Chief Complaint   Patient presents with    Follow - Up     Patient is here due to chronic otitis media of left ear      HPI:   Patient here for a chronic left serous otitis media    Current Medications[1]   Past Medical History[2]   Social History:  Short Social Hx on File[3]     REVIEW OF SYSTEMS:   GENERAL HEALTH: feels well otherwise  GENERAL : denies fever, chills, sweats, weight loss, weight gain  SKIN: denies any unusual skin lesions or rashes  RESPIRATORY: denies shortness of breath with exertion  NEURO: denies headaches    EXAM:   There were no vitals taken for this visit.  System Details   Skin Inspection - Normal.   Constitutional Overall appearance - Normal.   Head/Face Facial features - Normal. Eyebrows - Normal. Skull - Normal.   Eyes Conjunctiva - Right: Normal, Left: Normal. Pupil - Right: Normal, Left: Normal.    Ears Inspection - Right: Normal, Left: Normal.   Canal - Right: Normal, Left: Normal.   TM - Right: Normal, Left: Partial left serous otitis media.  Consent was obtained.  Area on the anterior inferior quadrant was anesthetized with phenol.  A radial incision was made.  A 1.14 mm router bobbin tube was placed into the incision after fluid was suctioned from the middle ear space.  Floxin drops were placed.  No complications.   Nasal External nose - Normal.    Oral/Oropharynx Lips - Normal   Neck Exam Inspection - Normal. Palpation - Normal. Parotid gland - Normal. Thyroid gland - Normal.   Lymph Detail Submental. Submandibular. Anterior cervical. Posterior cervical. Supraclavicular all without enlargement   Psychiatric Orientation - Oriented to time, place, person & situation. Appropriate mood and affect.   Neurological Memory - Normal. Cranial nerves - Cranial nerves II through XII grossly intact.     ASSESSMENT AND PLAN:   1. Chronic serous otitis media of left ear  PE tube placed as above.  Keep the ear dry.  Follow-up in 2 to 4 weeks time,  sooner if problems.    Use Floxin drops for 3 days.      The patient indicates understanding of these issues and agrees to the plan.      Divya Lomeli MD  6/23/2025  9:12 PM       [1]   Current Outpatient Medications   Medication Sig Dispense Refill    ofloxacin 0.3 % Otic Solution Place 5 drops into the left ear 2 (two) times daily for 10 days. 5 mL 0    azithromycin (ZITHROMAX Z-NOHEMY) 250 MG Oral Tab Take 1 by oral route every day for 10 days. 2 tablets today. 11 tablet 0    predniSONE 20 MG Oral Tab Take 1 tablet (20 mg total) by mouth daily. 3 tablet 0    semaglutide-weight management (WEGOVY) 2.4 MG/0.75ML Subcutaneous Solution Auto-injector Inject 0.75 mL (2.4 mg total) into the skin once a week. 9 mL 1    ALPRAZolam (XANAX) 0.25 MG Oral Tab Take 1 tablet (0.25 mg total) by mouth 2 (two) times daily as needed for Anxiety. 20 tablet 0    levothyroxine 88 MCG Oral Tab Take 1 tablet (88 mcg total) by mouth before breakfast. 90 tablet 3    acetaminophen 500 MG Oral Tab Take 2 tablets (1,000 mg total) by mouth every 4 (four) hours as needed for Pain. 30 tablet 0    Meloxicam 15 MG Oral Tab Take 1 tablet (15 mg total) by mouth daily as needed for Pain. 30 tablet 0   [2]   Past Medical History:   Anxiety    Disorder of thyroid    Thyroid disease    Visual impairment   [3]   Social History  Socioeconomic History    Marital status:    Tobacco Use    Smoking status: Never    Smokeless tobacco: Never   Vaping Use    Vaping status: Never Used   Substance and Sexual Activity    Alcohol use: Yes     Alcohol/week: 0.0 standard drinks of alcohol     Comment: Socially     Drug use: No   Other Topics Concern    Caffeine Concern Yes     Comment: Coffee, soda - 4 cups per day      Social Drivers of Health     Food Insecurity: No Food Insecurity (1/26/2025)    Food Insecurity     Food Insecurity: Never true   Transportation Needs: No Transportation Needs (1/26/2025)    Transportation Needs     Lack of Transportation: No    Housing Stability: Low Risk  (1/26/2025)    Housing Stability     Housing Instability: No

## 2025-06-24 NOTE — PATIENT INSTRUCTIONS
Left PE tube was placed in your ear.  Use Floxin drops for 3 days.  Keep the ear dry.  Follow-up in 2 to 4 weeks time, sooner if problems.

## 2025-07-17 ENCOUNTER — OFFICE VISIT (OUTPATIENT)
Dept: OTOLARYNGOLOGY | Facility: CLINIC | Age: 65
End: 2025-07-17
Payer: COMMERCIAL

## 2025-07-17 VITALS — WEIGHT: 185 LBS | BODY MASS INDEX: 28.04 KG/M2 | HEIGHT: 68 IN

## 2025-07-17 DIAGNOSIS — H65.22 CHRONIC SEROUS OTITIS MEDIA OF LEFT EAR: Primary | ICD-10-CM

## 2025-07-17 PROCEDURE — 99213 OFFICE O/P EST LOW 20 MIN: CPT | Performed by: SPECIALIST

## 2025-07-17 NOTE — PROGRESS NOTES
Parris Paredes is a 65 year old female.   Chief Complaint   Patient presents with    Follow - Up     chronic serous otitis media of left ear     HPI:   Patient had a left PE tube placed 6/23/2025.  Although mostly she feels improved, intermittently she feels a pressure in the left ear.    Current Medications[1]   Past Medical History[2]   Social History:  Short Social Hx on File[3]     REVIEW OF SYSTEMS:   GENERAL HEALTH: feels well otherwise  GENERAL : denies fever, chills, sweats, weight loss, weight gain  SKIN: denies any unusual skin lesions or rashes  RESPIRATORY: denies shortness of breath with exertion  NEURO: denies headaches    EXAM:   Ht 5' 8\" (1.727 m)   Wt 185 lb (83.9 kg)   BMI 28.13 kg/m²   System Details   Skin Inspection - Normal.   Constitutional Overall appearance - Normal.   Head/Face Facial features - Normal. Eyebrows - Normal. Skull - Normal.   Eyes Conjunctiva - Right: Normal, Left: Normal. Pupil - Right: Normal, Left: Normal.    Ears Inspection - Right: Normal, Left: Normal.   Canal - Right: Normal, Left: Normal.   TM - Right: Normal, Left: PE tube in good position.  No evidence of drainage.  Lumen of the tube is patent.       Oral/Oropharynx Lips - Normal   Neck Exam Inspection - Normal. Palpation - Normal. Parotid gland - Normal. Thyroid gland - Normal.   Lymph Detail Submental. Submandibular. Anterior cervical. Posterior cervical. Supraclavicular all without enlargement   Psychiatric Orientation - Oriented to time, place, person & situation. Appropriate mood and affect.   Neurological Memory - Normal. Cranial nerves - Cranial nerves II through XII grossly intact.     ASSESSMENT AND PLAN:   Left serous otitis media  PE tube placed on 6/23/2025 in good position and patent.  Patient however with some persistent symptoms.  She felt that these were better when she was on the Floxin drops.  Consider chronic mastoiditis.  I asked the patient to finish the Floxin drops.  She can call me when  she is done to let me know how this has helped.  Follow-up in 4 months time, sooner if problems.  Keep the left ear dry..    The patient indicates understanding of these issues and agrees to the plan.      Divya Lomeli MD  7/17/2025  4:12 PM       [1]   Current Outpatient Medications   Medication Sig Dispense Refill    semaglutide-weight management (WEGOVY) 2.4 MG/0.75ML Subcutaneous Solution Auto-injector Inject 0.75 mL (2.4 mg total) into the skin once a week. 9 mL 1    ALPRAZolam (XANAX) 0.25 MG Oral Tab Take 1 tablet (0.25 mg total) by mouth 2 (two) times daily as needed for Anxiety. 20 tablet 0    levothyroxine 88 MCG Oral Tab Take 1 tablet (88 mcg total) by mouth before breakfast. 90 tablet 3    acetaminophen 500 MG Oral Tab Take 2 tablets (1,000 mg total) by mouth every 4 (four) hours as needed for Pain. 30 tablet 0    Meloxicam 15 MG Oral Tab Take 1 tablet (15 mg total) by mouth daily as needed for Pain. 30 tablet 0    azithromycin (ZITHROMAX Z-NOHEMY) 250 MG Oral Tab Take 1 by oral route every day for 10 days. 2 tablets today. (Patient not taking: Reported on 7/17/2025) 11 tablet 0    predniSONE 20 MG Oral Tab Take 1 tablet (20 mg total) by mouth daily. (Patient not taking: Reported on 7/17/2025) 3 tablet 0   [2]   Past Medical History:   Anxiety    Disorder of thyroid    Thyroid disease    Visual impairment   [3]   Social History  Socioeconomic History    Marital status:    Tobacco Use    Smoking status: Never    Smokeless tobacco: Never   Vaping Use    Vaping status: Never Used   Substance and Sexual Activity    Alcohol use: Yes     Alcohol/week: 0.0 standard drinks of alcohol     Comment: Socially     Drug use: No   Other Topics Concern    Caffeine Concern Yes     Comment: Coffee, soda - 4 cups per day      Social Drivers of Health     Food Insecurity: No Food Insecurity (1/26/2025)    Food Insecurity     Food Insecurity: Never true   Transportation Needs: No Transportation Needs (1/26/2025)     Transportation Needs     Lack of Transportation: No   Housing Stability: Low Risk  (1/26/2025)    Housing Stability     Housing Instability: No

## 2025-07-17 NOTE — PATIENT INSTRUCTIONS
Your left PE tube was in good position and patent.  I placed you on Floxin drops for a possible chronic mastoiditis.  Please let me know when you are done with the drops if it is been helpful.  Keep the left ear dry.  Follow-up in 4 months time, sooner if problems.

## 2025-08-22 DIAGNOSIS — F41.9 ANXIETY: ICD-10-CM

## 2025-08-27 RX ORDER — ALPRAZOLAM 0.25 MG
0.25 TABLET ORAL 2 TIMES DAILY PRN
Qty: 20 TABLET | Refills: 0 | Status: SHIPPED | OUTPATIENT
Start: 2025-08-27

## 2025-08-27 RX ORDER — MELOXICAM 15 MG/1
15 TABLET ORAL DAILY PRN
Qty: 30 TABLET | Refills: 0 | Status: SHIPPED | OUTPATIENT
Start: 2025-08-27

## (undated) DIAGNOSIS — F32.A DEPRESSION, UNSPECIFIED DEPRESSION TYPE: ICD-10-CM

## (undated) DEVICE — SOLUTION IRRIG 3000ML 0.9% NACL FLX CONT

## (undated) DEVICE — TROCAR: Brand: KII® SLEEVE

## (undated) DEVICE — KIT CLEAN ENDOKIT 1.1OZ GOWNX2

## (undated) DEVICE — GAMMEX® PI HYBRID SIZE 6.5, STERILE POWDER-FREE SURGICAL GLOVE, POLYISOPRENE AND NEOPRENE BLEND: Brand: GAMMEX

## (undated) DEVICE — HOWELL D.A.S.H. SPHINCTEROTOME: Brand: D.A.S.H.

## (undated) DEVICE — GLOVE SUR 6 SENSICARE PI MIC PIP CRM PWD F

## (undated) DEVICE — SOLUTION IRRIG 1000ML 0.9% NACL USP BTL

## (undated) DEVICE — [HIGH FLOW INSUFFLATOR,  DO NOT USE IF PACKAGE IS DAMAGED,  KEEP DRY,  KEEP AWAY FROM SUNLIGHT,  PROTECT FROM HEAT AND RADIOACTIVE SOURCES.]: Brand: PNEUMOSURE

## (undated) DEVICE — MEDI-VAC NON-CONDUCTIVE SUCTION TUBING 6MM X 1.8M (6FT.) L: Brand: CARDINAL HEALTH

## (undated) DEVICE — STRIP PK 0.25INX5YD TIGHTLY WVN IODO CURAD

## (undated) DEVICE — ADHESIVE SKIN TOP FOR WND CLSR DERMBND ADV

## (undated) DEVICE — PATIENT RETURN ELECTRODE, SINGLE-USE, CONTACT QUALITY MONITORING, ADULT, WITH 9FT CORD, FOR PATIENTS WEIGING OVER 33LBS. (15KG): Brand: MEGADYNE

## (undated) DEVICE — MEDI-VAC NON-CONDUCTIVE SUCTION TUBING: Brand: CARDINAL HEALTH

## (undated) DEVICE — YANKAUER,BULB TIP,W/O VENT,RIGID,STERILE: Brand: MEDLINE

## (undated) DEVICE — Device: Brand: SUTURE PASSOR PRO

## (undated) DEVICE — GLOVE SUR 6.5 SENSICARE PI MIC PIP CRM PWD F

## (undated) DEVICE — INSUFFLATION NEEDLE TO ESTABLISH PNEUMOPERITONEUM.: Brand: INSUFFLATION NEEDLE

## (undated) DEVICE — CONMED SCOPE SAVER BITE BLOCK, 20X27 MM: Brand: SCOPE SAVER

## (undated) DEVICE — CLIP INT M L POLYMR LOK LIG HEM O LOK

## (undated) DEVICE — LOCKING DEVICE AND BIOPSY CAP FOR USE WITH RX BILIARY SYSTEM: Brand: RX LOCKING DEVICE AND BIOPSY CAP

## (undated) DEVICE — PLUMEPORT ACTIV LAPAROSCOPIC SMOKE FILTRATION DEVICE: Brand: PLUMEPORT ACTIVE

## (undated) DEVICE — V2 SPECIMEN COLLECTION MANIFOLD KIT: Brand: NEPTUNE

## (undated) DEVICE — Device

## (undated) DEVICE — KIT ENDO ORCAPOD 160/180/190

## (undated) DEVICE — SUT COAT VCRL + 0 54IN ABSRB UD ANTIBACT

## (undated) DEVICE — TROCAR: Brand: KII FIOS FIRST ENTRY

## (undated) DEVICE — LAP CHOLE: Brand: MEDLINE INDUSTRIES, INC.

## (undated) DEVICE — SUT MCRYL 4-0 18IN PS-2 ABSRB UD 19MM 3/8 CIR

## (undated) DEVICE — BALLOON HEMOSTATIC EUS LINEAR

## (undated) DEVICE — RETRIEVAL BALLOON CATHETER: Brand: EXTRACTOR™ PRO RX

## (undated) DEVICE — INTEGRATED BIOPSY CAP AND GUIDEWIRE LOCKING DEVICE: Brand: AUTOCAP™ RX

## (undated) DEVICE — TUBING MEGADYNE LAPAROSCOPIC

## (undated) DEVICE — TISSUE RETRIEVAL SYSTEM: Brand: INZII RETRIEVAL SYSTEM

## (undated) NOTE — MR AVS SNAPSHOT
[address] [facility] [id number]  [phone]               Thank you for choosing us for your health care visit with Campbell Woods MD.  We are glad to serve you and happy to provide you with this summary of your acquired. Please contact the Patient Business Office at [phone] if you have any questions related to insurance coverage. Thank you.            Medical Issues Discussed Today     Visit for screening mammogram    -  Primary      Instructions and Inform Eat plenty of protein, keep the fat content low Sugars:  sodas and sports drinks, candies and desserts   Eat plenty of low-fat dairy products High fat meats and dairy   Choose whole grain products Foods high in sodium   Water is best for hydration Fast anupama

## (undated) NOTE — LETTER
AUTHORIZATION FOR SURGICAL OPERATION OR OTHER PROCEDURE    1. I hereby authorize Federico Sanchez, and [facility] staff assigned to my case to perform the following operation and/or procedure at the [facility]:    _______________________________________________________________________________________________    Bilateral myringotomy   _______________________________________________________________________________________________    2.  My physician has explained the nature and purpose of the operation or other procedure, possible alternative methods of treatment, the risks involved, and the possibility of complication to me.  I acknowledge that no guarantee has been made as to the result that may be obtained.  3.  I recognize that, during the course of this operation, or other procedure, unforseen conditions may necessitate additional or different procedure than those listed above.  I, therefore, further authorize and request that the above named physician, his/her physician assistants or designees perform such procedures as are, in his/her professional opinion, necessary and desirable.  4.  Any tissue or organs removed in the operation or other procedure may be disposed of by and at the discretion of the [facility] and [facility].  5.  I understand that in the event of a medical emergency, I will be transported by local paramedics to [facility] or other hospital emergency department.  6.  I certify that I have read and fully understand the above consent to operation and/or other procedure.    7.  I acknowledge that my physician has explained sedation/analgesia administration to me including the risks and benefits.  I consent to the administration of sedation/analgesia as may be necessary or desirable in the judgement of my physician.    Witness signature: ___________________________________________________ Date:  ______/______/_____                    Time:   ________ A.M.  P.M.       Patient Name:  ______________________________________________________  (please print)      Patient signature:  ___________________________________________________             Relationship to Patient:           []  Parent    Responsible person                          []  Spouse  In case of minor or                    [] Other  _____________   Incompetent name:  __________________________________________________                               (please print)      _____________      Responsible person  In case of minor or  Incompetent signature:  _______________________________________________    Statement of Physician  My signature below affirms that prior to the time of the procedure, I have explained to the patient and/or his/her guardian, the risks and benefits involved in the proposed treatment and any reasonable alternative to the proposed treatment.  I have also explained the risks and benefits involved in the refusal of the proposed treatment and have answered the patient's questions.                        Date:  ______/______/_______  Provider                      Signature:  __________________________________________________________       Time:  ___________ A.M    P.M.

## (undated) NOTE — [place] [address]  Authorization for Invasive Procedures  Date: 01/27/25           Time: 1340    I hereby authorize Chaitanya Vasquez, my physician and his/her assistants (if applicable), which may include medical students, residents, and/or fellows, to perform the following surgical operation/ procedure and administer such anesthesia as may be determined necessary by my physician: ENDOSCOPIC RETROGRADE CHOLANGIOPANCREATOGRAPHY (ERCP)  on Parris JOSE Paredes  2.   I recognize that during the surgical operation/procedure, unforeseen conditions may necessitate additional or different procedures than those listed above.  I, therefore, further authorize and request that the above-named surgeon, assistants, or designees perform such procedures as are, in their judgment, necessary and desirable.    3.   My surgeon/physician has discussed prior to my surgery the potential benefits, risks and side effects of this procedure; the likelihood of achieving goals; and potential problems that might occur during recuperation.  They also discussed reasonable alternatives to the procedure, including risks, benefits, and side effects related to the alternatives and risks related to not receiving this procedure.  I have had all my questions answered and I acknowledge that no guarantee has been made as to the result that may be obtained.    4.   Should the need arise during my operation/procedure, which includes change of level of care prior to discharge, I also consent to the administration of blood and/or blood products.  Further, I understand that despite careful testing and screening of blood or blood products by collecting agencies, I may still be subject to ill effects as a result of receiving a blood transfusion and/or blood products.  The following are some, but not all, of the potential risks that can occur: fever and allergic reactions, hemolytic reactions, transmission of diseases such as  Hepatitis, AIDS and Cytomegalovirus (CMV) and fluid overload.  In the event that I wish to have an autologous transfusion of my own blood, or a directed donor transfusion, I will discuss this with my physician.   Check only if Refusing Blood or Blood Products  I understand refusal of blood or blood products as deemed necessary by my physician may have serious consequences to my condition to include possible death. I hereby assume responsibility for my refusal and release the hospital, its personnel, and my physicians from any responsibility for the consequences of my refusal.         o  Refuse         5.   I authorize the use of any specimen, organs, tissues, body parts or foreign objects that may be removed from my body during the operation/procedure for diagnosis, research or teaching purposes and their subsequent disposal by hospital authorities.  I also authorize the release of specimen test results and/or written reports to my treating physician on the hospital medical staff or other referring or consulting physicians involved in my care, at the discretion of the Pathologist or my treating physician.    6.   I consent to the photographing or videotaping of the operations or procedures to be performed, including appropriate portions of my body for medical, scientific, or educational purposes, provided my identity is not revealed by the pictures or by descriptive texts accompanying them.  If the procedure has been photographed/videotaped, the surgeon will obtain the original picture, image, videotape or CD.  The hospital will not be responsible for storage, release or maintenance of the picture, image, tape or CD.    7.   I consent to the presence of a  or observers in the operating room as deemed necessary by my physician or their designees.    8.   I recognize that in the event my procedure results in extended X-Ray/fluoroscopy time, I may develop a skin reaction.    9. If I have a Do Not  Attempt Resuscitation (DNAR) order in place, that status will be suspended while in the operating room, procedural suite, and during the recovery period unless otherwise explicitly stated by me (or a person authorized to consent on my behalf). The surgeon or my attending physician will determine when the applicable recovery period ends for purposes of reinstating the DNAR order.  10. Patients having a sterilization procedure: I understand that if the procedure is successful the results will be permanent and it will therefore be impossible for me to inseminate, conceive, or bear children.  I also understand that the procedure is intended to result in sterility, although the result has not been guaranteed.   11. I acknowledge that my physician has explained sedation/analgesia administration to me including the risk and benefits I consent to the administration of sedation/analgesia as may be necessary or desirable in the judgment of my physician.    I CERTIFY THAT I HAVE READ AND FULLY UNDERSTAND THE ABOVE CONSENT TO OPERATION and/or OTHER PROCEDURE.        ____________________________________       _________________________________      ______________________________  Signature of Patient         Signature of Responsible Person        Printed Name of Responsible Person        ____________________________________      _________________________________      ______________________________       Signature of Witness          Relationship to Patient                       Date                                       Time  Patient Name: Parris Paredes  : 1960    Reviewed: 2024   Printed: 2025  Medical Record #: [id number] Page 1 of 2             STATEMENT OF PHYSICIAN My signature below affirms that prior to the time of the procedure; I have explained to the patient and/or his/her legal representative, the risks and benefits involved in the proposed treatment and any reasonable alternative to the  proposed treatment. I have also explained the risks and benefits involved in refusal of the proposed treatment and alternatives to the proposed treatment and have answered the patient's questions. If I have a significant financial interest in a co-management agreement or a significant financial interest in any product or implant, or other significant relationship used in this procedure/surgery, I have disclosed this and had a discussion with my patient.     _______________________________________________________________ _____________________________  (Signature of Physician)                                                                                         (Date)                                   (Time)  Patient Name: Parris GARCIA Reggie  : 1960    Reviewed: 2024   Printed: 2025  Medical Record #: [id number] Page 2 of 2

## (undated) NOTE — LETTER
AUTHORIZATION FOR SURGICAL OPERATION OR OTHER PROCEDURE    1. I hereby authorize Dr. Lomeli, and [facility] staff assigned to my case to perform the following operation and/or procedure at the [facility] site:    _______________________________________________________________________________________________    Myringotomy with tube placement   _______________________________________________________________________________________________    2.  My physician has explained the nature and purpose of the operation or other procedure, possible alternative methods of treatment, the risks involved, and the possibility of complication to me.  I acknowledge that no guarantee has been made as to the result that may be obtained.  3.  I recognize that, during the course of this operation, or other procedure, unforseen conditions may necessitate additional or different procedure than those listed above.  I, therefore, further authorize and request that the above named physician, his/her physician assistants or designees perform such procedures as are, in his/her professional opinion, necessary and desirable.  4.  Any tissue or organs removed in the operation or other procedure may be disposed of by and at the discretion of the [facility] and [facility].  5.  I understand that in the event of a medical emergency, I will be transported by local paramedics to [facility] or other hospital emergency department.  6.  I certify that I have read and fully understand the above consent to operation and/or other procedure.    7.  I acknowledge that my physician has explained sedation/analgesia administration to me including the risks and benefits.  I consent to the administration of sedation/analgesia as may be necessary or desirable in the judgement of my physician.    Witness signature: ___________________________________________________ Date:  ______/______/_____                     Time:  ________ A.M.  P.M.       Patient Name:  ______________________________________________________  (please print)      Patient signature:  ___________________________________________________             Relationship to Patient:           []  Parent    Responsible person                          []  Spouse  In case of minor or                    [] Other  _____________   Incompetent name:  __________________________________________________                               (please print)      _____________      Responsible person  In case of minor or  Incompetent signature:  _______________________________________________    Statement of Physician  My signature below affirms that prior to the time of the procedure, I have explained to the patient and/or his/her guardian, the risks and benefits involved in the proposed treatment and any reasonable alternative to the proposed treatment.  I have also explained the risks and benefits involved in the refusal of the proposed treatment and have answered the patient's questions.                        Date:  ______/______/_______  Provider                      Signature:  __________________________________________________________       Time:  ___________ A.M    P.M.

## (undated) NOTE — LETTER
[facility]  [address], [place], [place]    Authorization for Surgical Operation and Procedure                               I hereby authorize Chaitanya Vasquez MD, my physician and his/her assistants (if applicable), which may include medical students, residents, and/or fellows, to perform the following surgical operation/ procedure and administer such anesthesia as may be determined necessary by my physician: Operation/Procedure name (s) ENDOSCOPIC RETROGRADE CHOLANGIOPANCREATOGRAPHY (ERCP) on Parris GARCIA Reggie   2.   I recognize that during the surgical operation/procedure, unforeseen conditions may necessitate additional or different procedures than those listed above.  I, therefore, further authorize and request that the above-named surgeon, assistants, or designees perform such procedures as are, in their judgment, necessary and desirable.    3.   My surgeon/physician has discussed prior to my surgery the potential benefits, risks and side effects of this procedure; the likelihood of achieving goals; and potential problems that might occur during recuperation.  They also discussed reasonable alternatives to the procedure, including risks, benefits, and side effects related to the alternatives and risks related to not receiving this procedure.  I have had all my questions answered and I acknowledge that no guarantee has been made as to the result that may be obtained.    4.   Should the need arise during my operation/procedure, which includes change of level of care prior to discharge, I also consent to the administration of blood and/or blood products.  Further, I understand that despite careful testing and screening of blood or blood products by collecting agencies, I may still be subject to ill effects as a result of receiving a blood transfusion and/or blood products.  The following are some, but not all, of the potential risks that can occur: fever and allergic reactions, hemolytic  reactions, transmission of diseases such as Hepatitis, AIDS and Cytomegalovirus (CMV) and fluid overload.  In the event that I wish to have an autologous transfusion of my own blood, or a directed donor transfusion, I will discuss this with my physician.  Check only if Refusing Blood or Blood Products  I understand refusal of blood or blood products as deemed necessary by my physician may have serious consequences to my condition to include possible death. I hereby assume responsibility for my refusal and release the hospital, its personnel, and my physicians from any responsibility for the consequences of my refusal.    o  Refuse   5.   I authorize the use of any specimen, organs, tissues, body parts or foreign objects that may be removed from my body during the operation/procedure for diagnosis, research or teaching purposes and their subsequent disposal by hospital authorities.  I also authorize the release of specimen test results and/or written reports to my treating physician on the hospital medical staff or other referring or consulting physicians involved in my care, at the discretion of the Pathologist or my treating physician.    6.   I consent to the photographing or videotaping of the operations or procedures to be performed, including appropriate portions of my body for medical, scientific, or educational purposes, provided my identity is not revealed by the pictures or by descriptive texts accompanying them.  If the procedure has been photographed/videotaped, the surgeon will obtain the original picture, image, videotape or CD.  The hospital will not be responsible for storage, release or maintenance of the picture, image, tape or CD.    7.   I consent to the presence of a  or observers in the operating room as deemed necessary by my physician or their designees.    8.   I recognize that in the event my procedure results in extended X-Ray/fluoroscopy time, I may develop a skin  reaction.    9. If I have a Do Not Attempt Resuscitation (DNAR) order in place, that status will be suspended while in the operating room, procedural suite, and during the recovery period unless otherwise explicitly stated by me (or a person authorized to consent on my behalf). The surgeon or my attending physician will determine when the applicable recovery period ends for purposes of reinstating the DNAR order.  10. Patients having a sterilization procedure: I understand that if the procedure is successful the results will be permanent and it will therefore be impossible for me to inseminate, conceive, or bear children.  I also understand that the procedure is intended to result in sterility, although the result has not been guaranteed.   11. I acknowledge that my physician has explained sedation/analgesia administration to me including the risk and benefits I consent to the administration of sedation/analgesia as may be necessary or desirable in the judgment of my physician.    I CERTIFY THAT I HAVE READ AND FULLY UNDERSTAND THE ABOVE CONSENT TO OPERATION and/or OTHER PROCEDURE.     ____________________________________  _________________________________        ______________________________  Signature of Patient    Signature of Responsible Person                Printed Name of Responsible Person                                      ____________________________________  _____________________________                ________________________________  Signature of Witness        Date  Time         Relationship to Patient    STATEMENT OF PHYSICIAN My signature below affirms that prior to the time of the procedure; I have explained to the patient and/or his/her legal representative, the risks and benefits involved in the proposed treatment and any reasonable alternative to the proposed treatment. I have also explained the risks and benefits involved in refusal of the proposed treatment and alternatives to the proposed  treatment and have answered the patient's questions. If I have a significant financial interest in a co-management agreement or a significant financial interest in any product or implant, or other significant relationship used in this procedure/surgery, I have disclosed this and had a discussion with my patient.     _____________________________________________________              _____________________________  (Signature of Physician)                                                                                         (Date)                                   (Time)  Patient Name: Parris JOSE Paredes      : 1960      Printed: 2025     Medical Record #: [id number]                                      Page 1 of 1

## (undated) NOTE — LETTER
[facility]  Administration of Anesthesia  I Parris JOSE Paredes agree to be cared for by a physician anesthesiologist alone and/or with a nurse anesthetist, who is specially trained to monitor me and give me medicine to put me to sleep or keep me comfortable during my procedure    I understand that my anesthesiologist and/or anesthetist is not an employee or agent of [facility] or [employer]. He or she works for [facility]    As the patient asking for anesthesia services, I agree to:  Allow the anesthesiologist (anesthesia doctor) to give me medicine and do additional procedures as necessary. Some examples are: Starting or using an “IV” to give me medicine, fluids or blood during my procedure, and having a breathing tube placed to help me breathe when I’m asleep (intubation). In the event that my heart stops working properly, I understand that my anesthesiologist will make every effort to sustain my life, unless otherwise directed by [facility] Do Not Resuscitate documents.  Tell my anesthesia doctor before my procedure:  If I am pregnant.  The last time that I ate or drank.  iii. All of the medicines I take (including prescriptions, herbal supplements, and pills I can buy without a prescription (including street drugs/illegal medications). Failure to inform my anesthesiologist about these medicines may increase my risk of anesthetic complications.  iv.If I am allergic to anything or have had a reaction to anesthesia before.  I understand how the anesthesia medicine will help me (benefits).  I understand that with any type of anesthesia medicine there are risks:  The most common risks are: nausea, vomiting, sore throat, muscle soreness, damage to my eyes, mouth, or teeth (from breathing tube placement).  Rare risks include: remembering what happened during my procedure, allergic reactions to medications, injury to my airway, heart, lungs, vision, nerves, or  muscles and in extremely rare instances death.  My doctor has explained to me other choices available to me for my care (alternatives).  Pregnant Patients (“epidural”):  I understand that the risks of having an epidural (medicine given into my back to help control pain during labor), include itching, low blood pressure, difficulty urinating, headache or slowing of the baby’s heart. Very rare risks include infection, bleeding, seizure, irregular heart rhythms and nerve injury.  Regional Anesthesia (“spinal”, “epidural”, & “nerve blocks”):  I understand that rare but potential complications include headache, bleeding, infection, seizure, irregular heart rhythms, and nerve injury.    _____________________________________________________________________________  Patient (or Representative) Signature/Relationship to Patient  Date   Time    _____________________________________________________________________________   Name (if used)    Language/Organization   Time    _____________________________________________________________________________  Nurse Anesthetist Signature     Date   Time  _____________________________________________________________________________  Anesthesiologist Signature     Date   Time  I have discussed the procedure and information above with the patient (or patient’s representative) and answered their questions. The patient or their representative has agreed to have anesthesia services.    _____________________________________________________________________________  Witness        Date   Time  I have verified that the signature is that of the patient or patient’s representative, and that it was signed before the procedure  Patient Name: Parris Paredes     : 1960                 Printed: 2025 at 1:33 PM    Medical Record #: [id number]                                            Page 1 of 1  ----------ANESTHESIA CONSENT----------

## (undated) NOTE — Clinical Note
Juan, I met with Christal Alvarado in clinic today for weight loss/management. I have recommended intensive lifestyle/behavioral modifications for weight loss. In addition, I have recommended she meet with our dietician and start [facility] medication if covered by insurance. She will follow up routinely for ongoing support. Please let me know if you have any questions or concerns. Thank you very much for referring your patient to our clinic.    Take good care, HEAVENLY Horowitz